# Patient Record
Sex: MALE | Race: WHITE | NOT HISPANIC OR LATINO | Employment: OTHER | URBAN - METROPOLITAN AREA
[De-identification: names, ages, dates, MRNs, and addresses within clinical notes are randomized per-mention and may not be internally consistent; named-entity substitution may affect disease eponyms.]

---

## 2023-03-03 PROBLEM — R13.19 ESOPHAGEAL DYSPHAGIA: Status: ACTIVE | Noted: 2023-03-03

## 2023-03-03 PROBLEM — R94.31 ABNORMAL EKG: Status: ACTIVE | Noted: 2023-03-03

## 2023-03-03 PROBLEM — E78.2 COMBINED HYPERLIPIDEMIA: Status: ACTIVE | Noted: 2023-03-03

## 2023-03-03 PROBLEM — J45.40 MODERATE PERSISTENT ASTHMA WITHOUT COMPLICATION (HHS-HCC): Status: ACTIVE | Noted: 2023-03-03

## 2023-03-03 PROBLEM — K21.9 GERD WITHOUT ESOPHAGITIS: Status: ACTIVE | Noted: 2023-03-03

## 2023-03-03 PROBLEM — R73.9 HYPERGLYCEMIA: Status: ACTIVE | Noted: 2023-03-03

## 2023-03-03 PROBLEM — N40.1 BPH WITH OBSTRUCTION/LOWER URINARY TRACT SYMPTOMS: Status: ACTIVE | Noted: 2023-03-03

## 2023-03-03 PROBLEM — L40.0 PLAQUE PSORIASIS: Status: ACTIVE | Noted: 2023-03-03

## 2023-03-03 PROBLEM — M25.552 LEFT HIP PAIN: Status: ACTIVE | Noted: 2023-03-03

## 2023-03-03 PROBLEM — N13.8 BPH WITH OBSTRUCTION/LOWER URINARY TRACT SYMPTOMS: Status: ACTIVE | Noted: 2023-03-03

## 2023-03-03 PROBLEM — J42 CHRONIC BRONCHITIS (MULTI): Status: ACTIVE | Noted: 2023-03-03

## 2023-03-03 RX ORDER — PREDNISONE 5 MG/1
5 TABLET ORAL DAILY
COMMUNITY
End: 2023-10-12 | Stop reason: ALTCHOICE

## 2023-03-03 RX ORDER — OMEPRAZOLE 40 MG/1
1 CAPSULE, DELAYED RELEASE ORAL DAILY
COMMUNITY
Start: 2019-06-06 | End: 2023-06-01 | Stop reason: SDUPTHER

## 2023-03-03 RX ORDER — CLOBETASOL PROPIONATE 0.5 MG/G
CREAM TOPICAL
COMMUNITY
Start: 2019-09-20 | End: 2023-12-04 | Stop reason: WASHOUT

## 2023-03-03 RX ORDER — ALBUTEROL SULFATE 90 UG/1
2 AEROSOL, METERED RESPIRATORY (INHALATION) EVERY 6 HOURS PRN
COMMUNITY
End: 2023-12-04 | Stop reason: WASHOUT

## 2023-03-03 RX ORDER — BUDESONIDE AND FORMOTEROL FUMARATE DIHYDRATE 160; 4.5 UG/1; UG/1
2 AEROSOL RESPIRATORY (INHALATION)
COMMUNITY
End: 2023-07-31

## 2023-03-03 RX ORDER — TAMSULOSIN HYDROCHLORIDE 0.4 MG/1
1 CAPSULE ORAL DAILY
COMMUNITY
Start: 2020-09-08 | End: 2023-06-01 | Stop reason: SDUPTHER

## 2023-03-03 RX ORDER — PREDNISONE 20 MG/1
TABLET ORAL
COMMUNITY
End: 2023-10-12 | Stop reason: ALTCHOICE

## 2023-03-09 RX ORDER — FLUTICASONE PROPIONATE AND SALMETEROL 100; 50 UG/1; UG/1
POWDER RESPIRATORY (INHALATION) 2 TIMES DAILY
COMMUNITY
End: 2023-10-12

## 2023-03-13 ENCOUNTER — OFFICE VISIT (OUTPATIENT)
Dept: PRIMARY CARE | Facility: CLINIC | Age: 73
End: 2023-03-13
Payer: MEDICARE

## 2023-03-13 VITALS
HEART RATE: 57 BPM | BODY MASS INDEX: 31.18 KG/M2 | HEIGHT: 66 IN | OXYGEN SATURATION: 95 % | TEMPERATURE: 98.1 F | WEIGHT: 194 LBS | DIASTOLIC BLOOD PRESSURE: 76 MMHG | SYSTOLIC BLOOD PRESSURE: 132 MMHG

## 2023-03-13 DIAGNOSIS — J42 CHRONIC BRONCHITIS, UNSPECIFIED CHRONIC BRONCHITIS TYPE (MULTI): Primary | ICD-10-CM

## 2023-03-13 DIAGNOSIS — Z00.00 GENERAL MEDICAL EXAM: ICD-10-CM

## 2023-03-13 DIAGNOSIS — J45.40 MODERATE PERSISTENT ASTHMA WITHOUT COMPLICATION (HHS-HCC): ICD-10-CM

## 2023-03-13 DIAGNOSIS — K21.9 GERD WITHOUT ESOPHAGITIS: ICD-10-CM

## 2023-03-13 DIAGNOSIS — E78.2 COMBINED HYPERLIPIDEMIA: ICD-10-CM

## 2023-03-13 DIAGNOSIS — R09.82 POST-NASAL DRIP: ICD-10-CM

## 2023-03-13 PROBLEM — M25.552 LEFT HIP PAIN: Status: RESOLVED | Noted: 2023-03-03 | Resolved: 2023-03-13

## 2023-03-13 PROBLEM — R94.31 ABNORMAL EKG: Status: RESOLVED | Noted: 2023-03-03 | Resolved: 2023-03-13

## 2023-03-13 PROCEDURE — G0439 PPPS, SUBSEQ VISIT: HCPCS | Performed by: FAMILY MEDICINE

## 2023-03-13 PROCEDURE — 1157F ADVNC CARE PLAN IN RCRD: CPT | Performed by: FAMILY MEDICINE

## 2023-03-13 PROCEDURE — 1170F FXNL STATUS ASSESSED: CPT | Performed by: FAMILY MEDICINE

## 2023-03-13 PROCEDURE — 99214 OFFICE O/P EST MOD 30 MIN: CPT | Performed by: FAMILY MEDICINE

## 2023-03-13 PROCEDURE — 3008F BODY MASS INDEX DOCD: CPT | Performed by: FAMILY MEDICINE

## 2023-03-13 PROCEDURE — 1160F RVW MEDS BY RX/DR IN RCRD: CPT | Performed by: FAMILY MEDICINE

## 2023-03-13 PROCEDURE — 1036F TOBACCO NON-USER: CPT | Performed by: FAMILY MEDICINE

## 2023-03-13 PROCEDURE — 1159F MED LIST DOCD IN RCRD: CPT | Performed by: FAMILY MEDICINE

## 2023-03-13 RX ORDER — IPRATROPIUM BROMIDE 21 UG/1
2 SPRAY, METERED NASAL ONCE
Status: DISCONTINUED | OUTPATIENT
Start: 2023-03-13 | End: 2023-10-12

## 2023-03-13 ASSESSMENT — ENCOUNTER SYMPTOMS
VOMITING: 0
CONSTIPATION: 0
UNEXPECTED WEIGHT CHANGE: 0
PALPITATIONS: 0

## 2023-03-13 ASSESSMENT — ACTIVITIES OF DAILY LIVING (ADL)
BATHING: INDEPENDENT
MANAGING_FINANCES: INDEPENDENT
DOING_HOUSEWORK: INDEPENDENT
TAKING_MEDICATION: INDEPENDENT
GROCERY_SHOPPING: INDEPENDENT
DRESSING: INDEPENDENT

## 2023-03-13 ASSESSMENT — PATIENT HEALTH QUESTIONNAIRE - PHQ9
2. FEELING DOWN, DEPRESSED OR HOPELESS: NOT AT ALL
SUM OF ALL RESPONSES TO PHQ9 QUESTIONS 1 AND 2: 0
1. LITTLE INTEREST OR PLEASURE IN DOING THINGS: NOT AT ALL

## 2023-03-13 NOTE — PROGRESS NOTES
"Subjective   Reason for Visit: Santy Siddiqi is an 72 y.o. male here for a Medicare Wellness visit.   Thinks he might have some PND, is still clearing throat a lot  Past Medical, Surgical, and Family History reviewed and updated in chart.    Reviewed all medications by prescribing practitioner or clinical pharmacist (such as prescriptions, OTCs, herbal therapies and supplements) and documented in the medical record.    HPI    Patient Self Assessment of Health Status  Patient Self Assessment: Good  Patient has history of chronic bronchitis asthma hyperlipidemia hyperglycemia GERD  No CP SOB edema  Like to wean PPI  Nutrition and Exercise  Current Diet: Well Balanced Diet  Adequate Fluid Intake: Yes  Caffeine: Yes  Exercise Frequency: Infrequently    Functional Ability/Level of Safety  Cognitive Impairment Observed: No cognitive impairment observed    Home Safety Risk Factors: None    Patient Care Team:  Gage Hobbs MD as PCP - General  Gage Hobbs MD as PCP - [employer] PCP     Review of Systems   Constitutional:  Negative for unexpected weight change.   HENT:  Negative for congestion and ear discharge.    Cardiovascular:  Negative for chest pain and palpitations.   Gastrointestinal:  Negative for constipation and vomiting.   All other systems reviewed and are negative.      Objective   Vitals:  /76   Pulse 57   Temp 36.7 °C (98.1 °F)   Ht 1.676 m (5' 6\")   Wt 88 kg (194 lb)   SpO2 95%   BMI 31.31 kg/m²       Physical Exam  HENT:      Head: Normocephalic and atraumatic.      Nose: Nose normal.      Mouth/Throat:      Mouth: Mucous membranes are moist.      Pharynx: No oropharyngeal exudate.   Eyes:      Extraocular Movements: Extraocular movements intact.      Conjunctiva/sclera: Conjunctivae normal.      Pupils: Pupils are equal, round, and reactive to light.   Cardiovascular:      Rate and Rhythm: Normal rate and regular rhythm.   Pulmonary:      Effort: Pulmonary effort is " normal.   Abdominal:      General: There is no distension.      Palpations: Abdomen is soft.   Musculoskeletal:      Cervical back: Normal range of motion and neck supple.   Lymphadenopathy:      Cervical: No cervical adenopathy.   Neurological:      General: No focal deficit present.      Mental Status: He is alert.   Psychiatric:         Attention and Perception: Attention normal.         Speech: Speech normal.         Behavior: Behavior is cooperative.         Assessment/Plan   Problem List Items Addressed This Visit          Respiratory    Chronic bronchitis (CMS/HCC) - Primary    Moderate persistent asthma without complication       Digestive    GERD without esophagitis       Other    Combined hyperlipidemia     Other Visit Diagnoses       Post-nasal drip        Relevant Medications    ipratropium (Atrovent) 21 mcg (0.03 %) nasal spray 2 spray    General medical exam              Reviewed labs  Discussed PND treatments  Discussed trial of weaning PPI

## 2023-03-14 DIAGNOSIS — R09.82 POST-NASAL DRIP: Primary | ICD-10-CM

## 2023-03-14 RX ORDER — IPRATROPIUM BROMIDE 21 UG/1
2 SPRAY, METERED NASAL 3 TIMES DAILY
Qty: 30 ML | Refills: 1 | Status: SHIPPED | OUTPATIENT
Start: 2023-03-14 | End: 2023-07-31

## 2023-03-14 NOTE — TELEPHONE ENCOUNTER
Per your instructions set up Ipratropium nasal spray, the pharmacy is flagging for dosing being too much, but you can adjust if needed

## 2023-03-14 NOTE — TELEPHONE ENCOUNTER
Pt. Called stating script did not go to pharmacy for Ipratropium (Atrovent) Note script was set up as clinic administered nasal spray. Should this be for nasal spray or inhaler? And how often to be used?

## 2023-05-31 DIAGNOSIS — N13.8 BPH WITH OBSTRUCTION/LOWER URINARY TRACT SYMPTOMS: ICD-10-CM

## 2023-05-31 DIAGNOSIS — N40.1 BPH WITH OBSTRUCTION/LOWER URINARY TRACT SYMPTOMS: ICD-10-CM

## 2023-05-31 DIAGNOSIS — K21.9 GERD WITHOUT ESOPHAGITIS: ICD-10-CM

## 2023-05-31 NOTE — TELEPHONE ENCOUNTER
Rx Refill Request Telephone Encounter    Name:  Santy Siddiqi  :  [id number]  Medication Name:    Omeprazole 40mg 1 tab every day 90 day  Tamsulosin 0.4mg 24hr cap 1 cap every day 90 day  Specific Pharmacy location:  [facility]  Date of last appointment:  22  Date of next appointment:  na  Best number to reach patient:  [phone]

## 2023-06-01 RX ORDER — OMEPRAZOLE 40 MG/1
40 CAPSULE, DELAYED RELEASE ORAL DAILY
Qty: 90 CAPSULE | Refills: 0 | Status: SHIPPED | OUTPATIENT
Start: 2023-06-01 | End: 2023-09-12 | Stop reason: SDUPTHER

## 2023-06-01 RX ORDER — TAMSULOSIN HYDROCHLORIDE 0.4 MG/1
0.4 CAPSULE ORAL DAILY
Qty: 90 CAPSULE | Refills: 0 | Status: SHIPPED | OUTPATIENT
Start: 2023-06-01 | End: 2023-09-12 | Stop reason: SDUPTHER

## 2023-07-31 DIAGNOSIS — R09.82 POST-NASAL DRIP: ICD-10-CM

## 2023-07-31 DIAGNOSIS — J45.40 MODERATE PERSISTENT ASTHMA WITHOUT COMPLICATION (HHS-HCC): Primary | ICD-10-CM

## 2023-07-31 RX ORDER — BUDESONIDE AND FORMOTEROL FUMARATE DIHYDRATE 160; 4.5 UG/1; UG/1
2 AEROSOL RESPIRATORY (INHALATION) 2 TIMES DAILY
Qty: 30.6 G | Refills: 0 | Status: SHIPPED | OUTPATIENT
Start: 2023-07-31 | End: 2023-11-06 | Stop reason: SDUPTHER

## 2023-07-31 RX ORDER — IPRATROPIUM BROMIDE 21 UG/1
SPRAY, METERED NASAL
Qty: 30 ML | Refills: 0 | Status: SHIPPED | OUTPATIENT
Start: 2023-07-31

## 2023-09-11 DIAGNOSIS — N40.1 BPH WITH OBSTRUCTION/LOWER URINARY TRACT SYMPTOMS: ICD-10-CM

## 2023-09-11 DIAGNOSIS — K21.9 GERD WITHOUT ESOPHAGITIS: ICD-10-CM

## 2023-09-11 DIAGNOSIS — N13.8 BPH WITH OBSTRUCTION/LOWER URINARY TRACT SYMPTOMS: ICD-10-CM

## 2023-09-11 NOTE — TELEPHONE ENCOUNTER
Pt calling for refills  LOV 3/13/2023    Tamsulosin 0.4mg  1 PO every day  90 days    Omeprazole 40mg  1 PO every day  90 days    KAMLESH GRAF

## 2023-09-12 RX ORDER — TAMSULOSIN HYDROCHLORIDE 0.4 MG/1
0.4 CAPSULE ORAL DAILY
Qty: 90 CAPSULE | Refills: 0 | Status: SHIPPED | OUTPATIENT
Start: 2023-09-12 | End: 2023-12-04 | Stop reason: SDUPTHER

## 2023-09-12 RX ORDER — OMEPRAZOLE 40 MG/1
40 CAPSULE, DELAYED RELEASE ORAL DAILY
Qty: 90 CAPSULE | Refills: 0 | Status: SHIPPED | OUTPATIENT
Start: 2023-09-12 | End: 2023-12-04 | Stop reason: SDUPTHER

## 2023-10-12 ENCOUNTER — OFFICE VISIT (OUTPATIENT)
Dept: PRIMARY CARE | Facility: CLINIC | Age: 73
End: 2023-10-12
Payer: MEDICARE

## 2023-10-12 VITALS
WEIGHT: 181 LBS | DIASTOLIC BLOOD PRESSURE: 60 MMHG | HEART RATE: 60 BPM | BODY MASS INDEX: 29.21 KG/M2 | OXYGEN SATURATION: 93 % | SYSTOLIC BLOOD PRESSURE: 110 MMHG

## 2023-10-12 DIAGNOSIS — J45.40 MODERATE PERSISTENT ASTHMA WITHOUT COMPLICATION (HHS-HCC): ICD-10-CM

## 2023-10-12 DIAGNOSIS — N13.8 BPH WITH OBSTRUCTION/LOWER URINARY TRACT SYMPTOMS: ICD-10-CM

## 2023-10-12 DIAGNOSIS — N40.1 BPH WITH OBSTRUCTION/LOWER URINARY TRACT SYMPTOMS: ICD-10-CM

## 2023-10-12 DIAGNOSIS — J41.1 MUCOPURULENT CHRONIC BRONCHITIS (MULTI): ICD-10-CM

## 2023-10-12 DIAGNOSIS — J31.0 CHRONIC RHINITIS: Primary | ICD-10-CM

## 2023-10-12 DIAGNOSIS — K21.9 GERD WITHOUT ESOPHAGITIS: ICD-10-CM

## 2023-10-12 PROCEDURE — 1160F RVW MEDS BY RX/DR IN RCRD: CPT | Performed by: FAMILY MEDICINE

## 2023-10-12 PROCEDURE — 99213 OFFICE O/P EST LOW 20 MIN: CPT | Performed by: FAMILY MEDICINE

## 2023-10-12 PROCEDURE — 1036F TOBACCO NON-USER: CPT | Performed by: FAMILY MEDICINE

## 2023-10-12 PROCEDURE — 3008F BODY MASS INDEX DOCD: CPT | Performed by: FAMILY MEDICINE

## 2023-10-12 PROCEDURE — 1159F MED LIST DOCD IN RCRD: CPT | Performed by: FAMILY MEDICINE

## 2023-10-12 ASSESSMENT — ENCOUNTER SYMPTOMS
PALPITATIONS: 0
CONSTIPATION: 0
VOMITING: 0
UNEXPECTED WEIGHT CHANGE: 0

## 2023-10-12 NOTE — PROGRESS NOTES
Needs some refills ,   still getting some reflux or drainage after hiatal hernia repair earlier this year has improved the swallowing hasn't had so many episodes of getting something caught has tried some decongestant to get rid of the drainage and Mucinex is too big for him to swallow  Subjective   Patient ID: Santy Siddiqi is a 73 y.o. male who presents for Follow-up.    HPI   Patient says he has chronic nasal drainage and clearing of throat  Does have ipratropium nasal spray, also has Symbicort for chronic bronchitis/moderate persistent asthma, also has history of GERD with repair of diaphragmatic hernia earlier this year and continues on PPI  No fever chills  Occasional light green sputum  Does have history at pneumonia remotely in [employer]  Uses Flomax for BPH with LUTS    Review of Systems   Constitutional:  Negative for unexpected weight change.   HENT:  Negative for congestion and ear discharge.    Cardiovascular:  Negative for chest pain and palpitations.   Gastrointestinal:  Negative for constipation and vomiting.   All other systems reviewed and are negative.      Objective   /60   Pulse 60   Wt 82.1 kg (181 lb)   SpO2 93%   BMI 29.21 kg/m²     Physical Exam  HENT:      Head: Normocephalic and atraumatic.      Nose: Nose normal.      Mouth/Throat:      Mouth: Mucous membranes are moist.      Pharynx: No oropharyngeal exudate.   Eyes:      Extraocular Movements: Extraocular movements intact.      Conjunctiva/sclera: Conjunctivae normal.      Pupils: Pupils are equal, round, and reactive to light.   Cardiovascular:      Rate and Rhythm: Normal rate and regular rhythm.   Pulmonary:      Effort: Pulmonary effort is normal.   Abdominal:      General: There is no distension.      Palpations: Abdomen is soft.   Musculoskeletal:      Cervical back: Normal range of motion and neck supple.   Lymphadenopathy:      Cervical: No cervical adenopathy.   Neurological:      General: No focal deficit present.       Mental Status: He is alert.   Psychiatric:         Attention and Perception: Attention normal.         Speech: Speech normal.         Behavior: Behavior is cooperative.         Assessment/Plan   Diagnoses and all orders for this visit:  Chronic rhinitis  Comments:  Recommend increasing Atrovent to 4 times daily  Orders:  -     Referral to ENT; Future  BPH with obstruction/lower urinary tract symptoms  Comments:  Discussed possibility of deseeding Flomax which can cause rhinitis frequently and seeking a UroLift procedure  GERD without esophagitis  Comments:  Patient says he is interested in deseeding omeprazole  Moderate persistent asthma without complication  Comments:  Continue puffers  Mucopurulent chronic bronchitis (CMS/HCC)  Comments:  Consider further evaluation of this with pulmonology  Recheck 3 months sooner if any issues arise

## 2023-10-27 ENCOUNTER — HOSPITAL ENCOUNTER (INPATIENT)
Facility: HOSPITAL | Age: 73
LOS: 5 days | Discharge: HOME | DRG: 418 | End: 2023-11-01
Attending: STUDENT IN AN ORGANIZED HEALTH CARE EDUCATION/TRAINING PROGRAM | Admitting: STUDENT IN AN ORGANIZED HEALTH CARE EDUCATION/TRAINING PROGRAM
Payer: MEDICARE

## 2023-10-27 ENCOUNTER — APPOINTMENT (OUTPATIENT)
Dept: RADIOLOGY | Facility: HOSPITAL | Age: 73
DRG: 418 | End: 2023-10-27
Payer: MEDICARE

## 2023-10-27 DIAGNOSIS — Z01.89 ENCOUNTER FOR CARDIOVERSION PROCEDURE: ICD-10-CM

## 2023-10-27 DIAGNOSIS — I43 CARDIOMYOPATHY IN DISEASES CLASSIFIED ELSEWHERE (MULTI): ICD-10-CM

## 2023-10-27 DIAGNOSIS — R93.89 ABNORMAL CT SCAN: ICD-10-CM

## 2023-10-27 DIAGNOSIS — K81.9 CHOLECYSTITIS: ICD-10-CM

## 2023-10-27 DIAGNOSIS — I48.91 ATRIAL FIBRILLATION WITH RAPID VENTRICULAR RESPONSE (MULTI): ICD-10-CM

## 2023-10-27 DIAGNOSIS — I48.91 NEW ONSET A-FIB (MULTI): ICD-10-CM

## 2023-10-27 DIAGNOSIS — K21.9 GERD WITHOUT ESOPHAGITIS: ICD-10-CM

## 2023-10-27 DIAGNOSIS — K56.609 INTESTINAL OBSTRUCTION, UNSPECIFIED CAUSE, UNSPECIFIED WHETHER PARTIAL OR COMPLETE (MULTI): Primary | ICD-10-CM

## 2023-10-27 DIAGNOSIS — K82.8 GALLBLADDER SLUDGE: ICD-10-CM

## 2023-10-27 DIAGNOSIS — R94.31 ABNORMAL ELECTROCARDIOGRAM (ECG) (EKG): ICD-10-CM

## 2023-10-27 LAB
ALBUMIN SERPL BCP-MCNC: 4.5 G/DL (ref 3.4–5)
ALP SERPL-CCNC: 68 U/L (ref 33–136)
ALT SERPL W P-5'-P-CCNC: 23 U/L (ref 10–52)
ANION GAP SERPL CALC-SCNC: 16 MMOL/L (ref 10–20)
AST SERPL W P-5'-P-CCNC: 22 U/L (ref 9–39)
BASOPHILS # BLD AUTO: 0.02 X10*3/UL (ref 0–0.1)
BASOPHILS NFR BLD AUTO: 0.3 %
BILIRUB SERPL-MCNC: 0.6 MG/DL (ref 0–1.2)
BUN SERPL-MCNC: 17 MG/DL (ref 6–23)
CALCIUM SERPL-MCNC: 10 MG/DL (ref 8.6–10.3)
CARDIAC TROPONIN I PNL SERPL HS: 4 NG/L (ref 0–20)
CARDIAC TROPONIN I PNL SERPL HS: 7 NG/L (ref 0–20)
CHLORIDE SERPL-SCNC: 103 MMOL/L (ref 98–107)
CO2 SERPL-SCNC: 25 MMOL/L (ref 21–32)
CREAT SERPL-MCNC: 1.15 MG/DL (ref 0.5–1.3)
EOSINOPHIL # BLD AUTO: 0.39 X10*3/UL (ref 0–0.4)
EOSINOPHIL NFR BLD AUTO: 6.7 %
ERYTHROCYTE [DISTWIDTH] IN BLOOD BY AUTOMATED COUNT: 12.6 % (ref 11.5–14.5)
GFR SERPL CREATININE-BSD FRML MDRD: 67 ML/MIN/1.73M*2
GLUCOSE SERPL-MCNC: 135 MG/DL (ref 74–99)
HCT VFR BLD AUTO: 46.7 % (ref 41–52)
HGB BLD-MCNC: 15.6 G/DL (ref 13.5–17.5)
IMM GRANULOCYTES # BLD AUTO: 0.01 X10*3/UL (ref 0–0.5)
IMM GRANULOCYTES NFR BLD AUTO: 0.2 % (ref 0–0.9)
LACTATE SERPL-SCNC: 2 MMOL/L (ref 0.4–2)
LACTATE SERPL-SCNC: 2.2 MMOL/L (ref 0.4–2)
LIPASE SERPL-CCNC: 16 U/L (ref 9–82)
LYMPHOCYTES # BLD AUTO: 1.71 X10*3/UL (ref 0.8–3)
LYMPHOCYTES NFR BLD AUTO: 29.4 %
MCH RBC QN AUTO: 30.6 PG (ref 26–34)
MCHC RBC AUTO-ENTMCNC: 33.4 G/DL (ref 32–36)
MCV RBC AUTO: 92 FL (ref 80–100)
MONOCYTES # BLD AUTO: 0.55 X10*3/UL (ref 0.05–0.8)
MONOCYTES NFR BLD AUTO: 9.5 %
NEUTROPHILS # BLD AUTO: 3.13 X10*3/UL (ref 1.6–5.5)
NEUTROPHILS NFR BLD AUTO: 53.9 %
NRBC BLD-RTO: 0 /100 WBCS (ref 0–0)
PLATELET # BLD AUTO: 278 X10*3/UL (ref 150–450)
PMV BLD AUTO: 9.6 FL (ref 7.5–11.5)
POTASSIUM SERPL-SCNC: 3.8 MMOL/L (ref 3.5–5.3)
PROT SERPL-MCNC: 8 G/DL (ref 6.4–8.2)
RBC # BLD AUTO: 5.09 X10*6/UL (ref 4.5–5.9)
SODIUM SERPL-SCNC: 140 MMOL/L (ref 136–145)
WBC # BLD AUTO: 5.8 X10*3/UL (ref 4.4–11.3)

## 2023-10-27 PROCEDURE — 96374 THER/PROPH/DIAG INJ IV PUSH: CPT

## 2023-10-27 PROCEDURE — 99285 EMERGENCY DEPT VISIT HI MDM: CPT | Performed by: STUDENT IN AN ORGANIZED HEALTH CARE EDUCATION/TRAINING PROGRAM

## 2023-10-27 PROCEDURE — 2550000001 HC RX 255 CONTRASTS: Performed by: STUDENT IN AN ORGANIZED HEALTH CARE EDUCATION/TRAINING PROGRAM

## 2023-10-27 PROCEDURE — 84075 ASSAY ALKALINE PHOSPHATASE: CPT | Performed by: STUDENT IN AN ORGANIZED HEALTH CARE EDUCATION/TRAINING PROGRAM

## 2023-10-27 PROCEDURE — 99222 1ST HOSP IP/OBS MODERATE 55: CPT | Performed by: STUDENT IN AN ORGANIZED HEALTH CARE EDUCATION/TRAINING PROGRAM

## 2023-10-27 PROCEDURE — 84484 ASSAY OF TROPONIN QUANT: CPT | Performed by: STUDENT IN AN ORGANIZED HEALTH CARE EDUCATION/TRAINING PROGRAM

## 2023-10-27 PROCEDURE — 74177 CT ABD & PELVIS W/CONTRAST: CPT | Mod: FR,MG

## 2023-10-27 PROCEDURE — 92960 CARDIOVERSION ELECTRIC EXT: CPT | Performed by: STUDENT IN AN ORGANIZED HEALTH CARE EDUCATION/TRAINING PROGRAM

## 2023-10-27 PROCEDURE — 36415 COLL VENOUS BLD VENIPUNCTURE: CPT | Performed by: STUDENT IN AN ORGANIZED HEALTH CARE EDUCATION/TRAINING PROGRAM

## 2023-10-27 PROCEDURE — 2500000004 HC RX 250 GENERAL PHARMACY W/ HCPCS (ALT 636 FOR OP/ED)

## 2023-10-27 PROCEDURE — 83605 ASSAY OF LACTIC ACID: CPT | Performed by: STUDENT IN AN ORGANIZED HEALTH CARE EDUCATION/TRAINING PROGRAM

## 2023-10-27 PROCEDURE — 96375 TX/PRO/DX INJ NEW DRUG ADDON: CPT

## 2023-10-27 PROCEDURE — 74018 RADEX ABDOMEN 1 VIEW: CPT | Mod: FOREIGN READ | Performed by: RADIOLOGY

## 2023-10-27 PROCEDURE — 74018 RADEX ABDOMEN 1 VIEW: CPT | Mod: FR

## 2023-10-27 PROCEDURE — 1100000001 HC PRIVATE ROOM DAILY

## 2023-10-27 PROCEDURE — 85025 COMPLETE CBC W/AUTO DIFF WBC: CPT | Performed by: STUDENT IN AN ORGANIZED HEALTH CARE EDUCATION/TRAINING PROGRAM

## 2023-10-27 PROCEDURE — 83690 ASSAY OF LIPASE: CPT | Performed by: STUDENT IN AN ORGANIZED HEALTH CARE EDUCATION/TRAINING PROGRAM

## 2023-10-27 PROCEDURE — 0D9670Z DRAINAGE OF STOMACH WITH DRAINAGE DEVICE, VIA NATURAL OR ARTIFICIAL OPENING: ICD-10-PCS | Performed by: STUDENT IN AN ORGANIZED HEALTH CARE EDUCATION/TRAINING PROGRAM

## 2023-10-27 PROCEDURE — 74177 CT ABD & PELVIS W/CONTRAST: CPT | Mod: FOREIGN READ | Performed by: RADIOLOGY

## 2023-10-27 PROCEDURE — 2500000004 HC RX 250 GENERAL PHARMACY W/ HCPCS (ALT 636 FOR OP/ED): Performed by: STUDENT IN AN ORGANIZED HEALTH CARE EDUCATION/TRAINING PROGRAM

## 2023-10-27 RX ORDER — ACETAMINOPHEN 650 MG/1
650 SUPPOSITORY RECTAL EVERY 4 HOURS PRN
Status: DISCONTINUED | OUTPATIENT
Start: 2023-10-27 | End: 2023-10-28

## 2023-10-27 RX ORDER — ACETAMINOPHEN 325 MG/1
650 TABLET ORAL EVERY 4 HOURS PRN
Status: DISCONTINUED | OUTPATIENT
Start: 2023-10-27 | End: 2023-10-28

## 2023-10-27 RX ORDER — ONDANSETRON 4 MG/1
4 TABLET, FILM COATED ORAL EVERY 8 HOURS PRN
Status: DISCONTINUED | OUTPATIENT
Start: 2023-10-27 | End: 2023-10-28

## 2023-10-27 RX ORDER — HYDROMORPHONE HYDROCHLORIDE 1 MG/ML
INJECTION, SOLUTION INTRAMUSCULAR; INTRAVENOUS; SUBCUTANEOUS
Status: COMPLETED
Start: 2023-10-27 | End: 2023-10-27

## 2023-10-27 RX ORDER — PANTOPRAZOLE SODIUM 40 MG/10ML
40 INJECTION, POWDER, LYOPHILIZED, FOR SOLUTION INTRAVENOUS
Status: DISCONTINUED | OUTPATIENT
Start: 2023-10-28 | End: 2023-10-28

## 2023-10-27 RX ORDER — MORPHINE SULFATE 2 MG/ML
1 INJECTION, SOLUTION INTRAMUSCULAR; INTRAVENOUS EVERY 4 HOURS PRN
Status: DISCONTINUED | OUTPATIENT
Start: 2023-10-27 | End: 2023-10-28

## 2023-10-27 RX ORDER — ONDANSETRON HYDROCHLORIDE 2 MG/ML
INJECTION, SOLUTION INTRAVENOUS
Status: COMPLETED
Start: 2023-10-27 | End: 2023-10-27

## 2023-10-27 RX ORDER — ENOXAPARIN SODIUM 100 MG/ML
40 INJECTION SUBCUTANEOUS EVERY 24 HOURS
Status: DISCONTINUED | OUTPATIENT
Start: 2023-10-27 | End: 2023-10-28

## 2023-10-27 RX ORDER — ONDANSETRON HYDROCHLORIDE 2 MG/ML
4 INJECTION, SOLUTION INTRAVENOUS ONCE
Status: COMPLETED | OUTPATIENT
Start: 2023-10-27 | End: 2023-10-27

## 2023-10-27 RX ORDER — PANTOPRAZOLE SODIUM 40 MG/1
40 TABLET, DELAYED RELEASE ORAL
Status: DISCONTINUED | OUTPATIENT
Start: 2023-10-28 | End: 2023-10-28

## 2023-10-27 RX ORDER — MORPHINE SULFATE 4 MG/ML
4 INJECTION INTRAVENOUS ONCE
Status: COMPLETED | OUTPATIENT
Start: 2023-10-27 | End: 2023-10-27

## 2023-10-27 RX ORDER — SODIUM CHLORIDE 9 MG/ML
75 INJECTION, SOLUTION INTRAVENOUS CONTINUOUS
Status: DISCONTINUED | OUTPATIENT
Start: 2023-10-27 | End: 2023-10-28

## 2023-10-27 RX ORDER — IPRATROPIUM BROMIDE AND ALBUTEROL SULFATE 2.5; .5 MG/3ML; MG/3ML
3 SOLUTION RESPIRATORY (INHALATION)
Status: DISCONTINUED | OUTPATIENT
Start: 2023-10-28 | End: 2023-10-28

## 2023-10-27 RX ORDER — BUDESONIDE 0.5 MG/2ML
0.5 INHALANT ORAL
Status: DISCONTINUED | OUTPATIENT
Start: 2023-10-28 | End: 2023-11-01 | Stop reason: HOSPADM

## 2023-10-27 RX ORDER — TAMSULOSIN HYDROCHLORIDE 0.4 MG/1
0.4 CAPSULE ORAL DAILY
Status: DISCONTINUED | OUTPATIENT
Start: 2023-10-28 | End: 2023-11-01 | Stop reason: HOSPADM

## 2023-10-27 RX ORDER — ACETAMINOPHEN 160 MG/5ML
650 SOLUTION ORAL EVERY 4 HOURS PRN
Status: DISCONTINUED | OUTPATIENT
Start: 2023-10-27 | End: 2023-10-28

## 2023-10-27 RX ORDER — HYDROMORPHONE HYDROCHLORIDE 1 MG/ML
1 INJECTION, SOLUTION INTRAMUSCULAR; INTRAVENOUS; SUBCUTANEOUS ONCE
Status: COMPLETED | OUTPATIENT
Start: 2023-10-27 | End: 2023-10-27

## 2023-10-27 RX ORDER — ONDANSETRON HYDROCHLORIDE 2 MG/ML
4 INJECTION, SOLUTION INTRAVENOUS EVERY 8 HOURS PRN
Status: DISCONTINUED | OUTPATIENT
Start: 2023-10-27 | End: 2023-10-28

## 2023-10-27 RX ADMIN — ONDANSETRON 4 MG: 2 INJECTION INTRAMUSCULAR; INTRAVENOUS at 19:10

## 2023-10-27 RX ADMIN — IOHEXOL 75 ML: 350 INJECTION, SOLUTION INTRAVENOUS at 19:49

## 2023-10-27 RX ADMIN — HYDROMORPHONE HYDROCHLORIDE 1 MG: 1 INJECTION, SOLUTION INTRAMUSCULAR; INTRAVENOUS; SUBCUTANEOUS at 20:16

## 2023-10-27 RX ADMIN — MORPHINE SULFATE 4 MG: 4 INJECTION INTRAVENOUS at 19:25

## 2023-10-27 RX ADMIN — ONDANSETRON HYDROCHLORIDE 4 MG: 2 INJECTION, SOLUTION INTRAVENOUS at 19:10

## 2023-10-27 ASSESSMENT — COLUMBIA-SUICIDE SEVERITY RATING SCALE - C-SSRS
6. HAVE YOU EVER DONE ANYTHING, STARTED TO DO ANYTHING, OR PREPARED TO DO ANYTHING TO END YOUR LIFE?: NO
1. IN THE PAST MONTH, HAVE YOU WISHED YOU WERE DEAD OR WISHED YOU COULD GO TO SLEEP AND NOT WAKE UP?: NO
2. HAVE YOU ACTUALLY HAD ANY THOUGHTS OF KILLING YOURSELF?: NO

## 2023-10-27 ASSESSMENT — PAIN SCALES - GENERAL
PAINLEVEL_OUTOF10: 9

## 2023-10-27 ASSESSMENT — PAIN DESCRIPTION - PAIN TYPE: TYPE: ACUTE PAIN

## 2023-10-27 ASSESSMENT — LIFESTYLE VARIABLES
HAVE YOU EVER FELT YOU SHOULD CUT DOWN ON YOUR DRINKING: NO
HAVE PEOPLE ANNOYED YOU BY CRITICIZING YOUR DRINKING: NO
EVER HAD A DRINK FIRST THING IN THE MORNING TO STEADY YOUR NERVES TO GET RID OF A HANGOVER: NO
EVER FELT BAD OR GUILTY ABOUT YOUR DRINKING: NO
REASON UNABLE TO ASSESS: YES

## 2023-10-27 ASSESSMENT — PAIN DESCRIPTION - DESCRIPTORS: DESCRIPTORS: CRAMPING

## 2023-10-27 ASSESSMENT — PAIN - FUNCTIONAL ASSESSMENT: PAIN_FUNCTIONAL_ASSESSMENT: 0-10

## 2023-10-27 ASSESSMENT — PAIN DESCRIPTION - LOCATION: LOCATION: ABDOMEN

## 2023-10-27 NOTE — ED PROVIDER NOTES
History/Exam limitations: none  HPI was provided by patient    HPI:    Chief Complaint   Patient presents with    Abdominal Pain    Nausea        Santy Siddiqi is a 73 y.o. male with pertinent history of hiatal hernia repair back in June presenting chief complaint of nausea vomiting and abdominal pain.  This started 1 hour prior to arrival he also has some sweating.  States he has not vomited anything but has been dry heaving.  Did eat bologna that he feels may be started this.  Denies symptoms are made better worse by anything.  They deny recent travel, similar symptoms found amongst close contacts or recent antibiotic use.       ROS:  All other review of systems are negative except as noted above and HPI or ROS.   CONSTITUTIONAL: fever, chills  ENT: sore throat, congestion, rhinorrhea  CARDIOVASCULAR: chest pain, palpitations, swelling  RESPIRATORY: cough, wheeze, shortness of breath  GI: nausea, vomiting, diarrhea, abdominal pain,  black or tarry stools, constipation  GENITOURINARY: dysuria, hematuria, frequency  MUSCULOSKELETAL: deformity, neck pain  SKIN: rash, lesion  NEUROLOGIC: headache, numbness, focal weakness  NOTES: All systems reviewed, negative except as described above     Physical Exam:  GENERAL: Alert, oriented , cooperative, sweating  HEAD: normocephalic, atraumatic  SKIN:  dry skin, no lesions.  EYES: PERRL, EOMs intact,  Conjunctiva pink with no erythema or exudates. No scleral icterus.   ENT: No external deformities. Nares patent, mucus membranes moist.  Pharynx clear, uvula midline.   NECK: Supple, without meningismus. Trachea at midline. No lymphadenopathy.  PULMONARY: Clear bilaterally. No crackles, rhonchi, wheezing.  No respiratory distress.  No work of breathing.  CARDIAC: Regular rate and regular rhythm.  Pulses 2+ in radials and dorsal pedal pulses bilaterally.  No murmur, rub, gallop.  No edema.  ABDOMEN: Soft, mild epigastric tender to palpation, active bowel sounds.  No palpable  organomegaly.  No rebound or guarding.  No CVA tenderness.  No pulsatile masses.  Negative Reynolds sign, negative McBurney point  : Exam deferred.  MUSCULOSKELETAL: Full range of motion throughout, no deformity.   NEUROLOGICAL:  no focal neuro deficits.  Strength 5 out of 5 throughout bilateral upper and lower extremities neurovascular intact in bilateral upper and lower extremities  PSYCHIATRIC: Appropriate mood and affect. Calm.         Past Medical History:   Diagnosis Date    Inadequate sleep hygiene     History of difficulty sleeping    Overactive bladder     Overactive bladder    Personal history of colonic polyps     History of colonic polyps    Personal history of other diseases of male genital organs     History of benign prostatic hyperplasia    Personal history of other diseases of the digestive system     History of diverticulitis    Personal history of other diseases of the digestive system     History of hemorrhoids    Personal history of other diseases of the musculoskeletal system and connective tissue     History of degenerative disc disease    Personal history of other specified conditions     History of fatigue      Social History     Socioeconomic History    Marital status:      Spouse name: None    Number of children: None    Years of education: None    Highest education level: None   Occupational History    None   Tobacco Use    Smoking status: Former     Types: Cigarettes    Smokeless tobacco: Never   Vaping Use    Vaping Use: Never used   Substance and Sexual Activity    Alcohol use: Yes     Comment: glass of wine 2-3 times per year    Drug use: Never    Sexual activity: None   Other Topics Concern    None   Social History Narrative    None     Social Determinants of Health     Financial Resource Strain: Low Risk  (10/28/2023)    Overall Financial Resource Strain (CARDIA)     Difficulty of Paying Living Expenses: Not very hard   Food Insecurity: Not on file   Transportation Needs: No  "Transportation Needs (10/28/2023)    PRAPARE - Transportation     Lack of Transportation (Medical): No     Lack of Transportation (Non-Medical): No   Physical Activity: Not on file   Stress: Not on file   Social Connections: Not on file   Intimate Partner Violence: Not on file   Housing Stability: Low Risk  (10/28/2023)    Housing Stability Vital Sign     Unable to Pay for Housing in the Last Year: No     Number of Places Lived in the Last Year: 1     Unstable Housing in the Last Year: No     Current Outpatient Medications   Medication Instructions    albuterol 90 mcg/actuation inhaler 2 puffs, inhalation, Every 6 hours PRN    clobetasol (Temovate) 0.05 % cream Apply sparingly to affected area(s) twice daily for up to one week at a time    ipratropium (Atrovent) 21 mcg (0.03 %) nasal spray use 2 sprays in each nostril in the morning and 2 sprays in the evening and 2 sprays before bedtime    omeprazole (PRILOSEC) 40 mg, oral, Daily    Symbicort 160-4.5 mcg/actuation inhaler 2 puffs, inhalation, 2 times daily, RINSE MOUTH AFTER USE    tamsulosin (FLOMAX) 0.4 mg, oral, Daily     No Known Allergies        ED Triage Vitals [10/27/23 1849]   Temp Heart Rate Resp BP   36.2 °C (97.2 °F) 83 20 (!) 183/89      SpO2 Temp Source Heart Rate Source Patient Position   100 % Temporal Monitor Lying      BP Location FiO2 (%)     Left arm --               Labs and Imaging  XR abdomen 1 view   Final Result   Gastric termination of enteric tube.   Signed by Devon Flor II, MD      XR abdomen 3+ views   Final Result   1.  Nonobstructive bowel gas pattern.   2. Contrast opacification is visualized to the level of the 3rd   segment of the duodenum.        MACRO:   None.        Signed by: Addis Mckeon 10/28/2023 5:41 PM   Dictation workstation:   [id number]      US gallbladder   Final Result   Distended gallbladder with wall thickening, \"sludge\" and probably a   few small gallstones.        MACRO:   None        Signed by: Leander" Demetrimil 10/28/2023 4:49 PM   Dictation workstation:   [id number]      XR abdomen 1 view   Final Result   Enteric tube as described.   Signed by Joby Oneil MD      CT abdomen pelvis w IV contrast   Final Result   1. Small bowel loops in the right lower quadrant are minimally air and   fluid distended: cannot exclude early and/or developing small bowel   obstruction. The stomach is also moderately distended with air and   fluid. Close follow-up advised.   2. Gallbladder is distended with suggestion of small stones. No   gallbladder wall thickening or biliary duct dilatation.   3. Remainder as above.   Signed by Jaylon Tejada MD      Transthoracic Echo (TTE) Complete    (Results Pending)   XR abdomen 1 view    (Results Pending)     Labs Reviewed   COMPREHENSIVE METABOLIC PANEL - Abnormal       Result Value    Glucose 135 (*)     Sodium 140      Potassium 3.8      Chloride 103      Bicarbonate 25      Anion Gap 16      Urea Nitrogen 17      Creatinine 1.15      eGFR 67      Calcium 10.0      Albumin 4.5      Alkaline Phosphatase 68      Total Protein 8.0      AST 22      Bilirubin, Total 0.6      ALT 23     LACTATE - Abnormal    Lactate 2.2 (*)     Narrative:     Venipuncture immediately after or during the administration of Metamizole may lead to falsely low results. Testing should be performed immediately  prior to Metamizole dosing.   BASIC METABOLIC PANEL - Abnormal    Glucose 129 (*)     Sodium 136      Potassium 4.4      Chloride 100      Bicarbonate 26      Anion Gap 14      Urea Nitrogen 12      Creatinine 0.88      eGFR >90      Calcium 8.9     COMPREHENSIVE METABOLIC PANEL - Abnormal    Glucose 122 (*)     Sodium 132 (*)     Potassium 3.8      Chloride 98      Bicarbonate 26      Anion Gap 12      Urea Nitrogen 11      Creatinine 0.85      eGFR >90      Calcium 8.9      Albumin 4.1      Alkaline Phosphatase 60      Total Protein 7.1      AST 30      Bilirubin, Total 1.2      ALT 27     TROPONIN I, HIGH  SENSITIVITY - Abnormal    Troponin I, High Sensitivity 485 (*)     Narrative:     Less than 99th percentile of normal range cutoff-  Female and children under 18 years old <14 ng/L; Male <21 ng/L: Negative  Repeat testing should be performed if clinically indicated.     Female and children under 18 years old 14-50 ng/L; Male 21-50 ng/L:  Consistent with possible cardiac damage and possible increased clinical   risk. Serial measurements may help to assess extent of myocardial damage.     >50 ng/L: Consistent with cardiac damage, increased clinical risk and  myocardial infarction. Serial measurements may help assess extent of   myocardial damage.      NOTE: Children less than 1 year old may have higher baseline troponin   levels and results should be interpreted in conjunction with the overall   clinical context.     NOTE: Troponin I testing is performed using a different   testing methodology at [facility] than at other   [facility]. Direct result comparisons should only   be made within the same method.   TROPONIN I, HIGH SENSITIVITY - Abnormal    Troponin I, High Sensitivity 362 (*)     Narrative:     Less than 99th percentile of normal range cutoff-  Female and children under 18 years old <14 ng/L; Male <21 ng/L: Negative  Repeat testing should be performed if clinically indicated.     Female and children under 18 years old 14-50 ng/L; Male 21-50 ng/L:  Consistent with possible cardiac damage and possible increased clinical   risk. Serial measurements may help to assess extent of myocardial damage.     >50 ng/L: Consistent with cardiac damage, increased clinical risk and  myocardial infarction. Serial measurements may help assess extent of   myocardial damage.      NOTE: Children less than 1 year old may have higher baseline troponin   levels and results should be interpreted in conjunction with the overall   clinical context.     NOTE: Troponin I testing is performed using a different    testing methodology at [facility] than at other   [facility]. Direct result comparisons should only   be made within the same method.   HEPARIN ASSAY - Abnormal    Heparin Unfractionated 1.2 (*)     Narrative:     The therapeutic reference range for UFH may be either 0.3-0.6 IU/mL or 0.3-0.7 IU/mL based on the clinical setting for anticoagulant therapy and the associated nomogram used. For Heparin dosing guidelines based on clinical scenario and Heparin Assay results, please refer to local Pharmacy and the [facility] Guidelines for Anticoagulation Therapy available on the [facility] intranet at: [email or web address]   LIPASE - Normal    Lipase 16      Narrative:     Venipuncture immediately after or during the administration of Metamizole may lead to falsely low results. Testing should be performed immediately prior to Metamizole dosing.   LACTATE - Normal    Lactate 2.0      Narrative:     Venipuncture immediately after or during the administration of Metamizole may lead to falsely low results. Testing should be performed immediately  prior to Metamizole dosing.   SERIAL TROPONIN-INITIAL - Normal    Troponin I, High Sensitivity 4      Narrative:     Less than 99th percentile of normal range cutoff-  Female and children under 18 years old <14 ng/L; Male <21 ng/L: Negative  Repeat testing should be performed if clinically indicated.     Female and children under 18 years old 14-50 ng/L; Male 21-50 ng/L:  Consistent with possible cardiac damage and possible increased clinical   risk. Serial measurements may help to assess extent of myocardial damage.     >50 ng/L: Consistent with cardiac damage, increased clinical risk and  myocardial infarction. Serial measurements may help assess extent of   myocardial damage.      NOTE: Children less than 1 year old may have higher baseline troponin   levels and results should  be interpreted in conjunction with the overall   clinical context.     NOTE: Troponin I testing is performed using a different   testing methodology at [facility] than at other   [facility]. Direct result comparisons should only   be made within the same method.   TROPONIN I, HIGH SENSITIVITY - Normal    Troponin I, High Sensitivity 7      Narrative:     Less than 99th percentile of normal range cutoff-  Female and children under 18 years old <14 ng/L; Male <21 ng/L: Negative  Repeat testing should be performed if clinically indicated.     Female and children under 18 years old 14-50 ng/L; Male 21-50 ng/L:  Consistent with possible cardiac damage and possible increased clinical   risk. Serial measurements may help to assess extent of myocardial damage.     >50 ng/L: Consistent with cardiac damage, increased clinical risk and  myocardial infarction. Serial measurements may help assess extent of   myocardial damage.      NOTE: Children less than 1 year old may have higher baseline troponin   levels and results should be interpreted in conjunction with the overall   clinical context.     NOTE: Troponin I testing is performed using a different   testing methodology at [facility] than at other   [facility]. Direct result comparisons should only   be made within the same method.   CBC - Normal    WBC 8.8      nRBC 0.0      RBC 4.85      Hemoglobin 14.9      Hematocrit 44.8      MCV 92      MCH 30.7      MCHC 33.3      RDW 12.7      Platelets 267      MPV 10.2     MAGNESIUM - Normal    Magnesium 1.74     PHOSPHORUS - Normal    Phosphorus 2.9     COAGULATION SCREEN - Normal    Protime 11.8      INR 1.0      aPTT 31      Narrative:     The APTT is no longer used for monitoring Unfractionated Heparin Therapy. For monitoring Heparin Therapy, use the Heparin Assay.   CBC WITH AUTO DIFFERENTIAL    WBC 5.8      nRBC 0.0      RBC 5.09      Hemoglobin 15.6      Hematocrit 46.7      MCV 92       MCH 30.6      MCHC 33.4      RDW 12.6      Platelets 278      MPV 9.6      Neutrophils % 53.9      Immature Granulocytes %, Automated 0.2      Lymphocytes % 29.4      Monocytes % 9.5      Eosinophils % 6.7      Basophils % 0.3      Neutrophils Absolute 3.13      Immature Granulocytes Absolute, Automated 0.01      Lymphocytes Absolute 1.71      Monocytes Absolute 0.55      Eosinophils Absolute 0.39      Basophils Absolute 0.02     TROPONIN SERIES- (INITIAL, 1 HR)    Narrative:     The following orders were created for panel order Troponin Series, (0, 1 HR).  Procedure                               Abnormality         Status                     ---------                               -----------         ------                     Troponin I, High Sensiti...[[id number]]  Normal              Final result                 Please view results for these tests on the individual orders.   GRAY TOP    Extra Tube Hold for add-ons.     CBC   APTT   HEPARIN ASSAY         Medical Decision Making:     The patient presented for evaluation of abdominal pain.  Differential included but not limited to UTI, bowel obstruction, appendicitis, constipation, viral illness, pancreatitis, cholecystitis.  Patient was given Zofran, morphine for symptomatic relief. Imaging studies if performed were independently reviewed and interpreted by myself and confirmed by radiologist.         External Records Reviewed: I reviewed recent and relevant outside records    ED Course as of 10/29/23 0537   Fri Oct 27, 2023   1905 EKG interpreted by me shows sinus bradycardia with premature ventricular complexes/fusion complexes.  No STEMI.  Rate 51  QRS 82 QTc 381. [WL]   2007 On reevaluation after given morphine still having pain.  We will give him Dilaudid. [WL]   2117 He shows findings of possible developing bowel obstruction.  Consult made to Dr. Sandoval who agrees on admission and admit to medicine.  Advises placing NG. [WL]   2120 Patient had episode  "of hypoxia and bradycardia likely secondary to pain medication given as he was able to be quickly reawoke him but place him on O2 nasal cannula as precaution. [WL]   2123 Consult made to hospitalist who agreed on admission. [WL]      ED Course User Index  [WL] Reyes JORGENSEN DO Ajit         Diagnoses as of 10/29/23 0537   Intestinal obstruction, unspecified cause, unspecified whether partial or complete (CMS/HCC)   Gallbladder sludge   Abnormal CT scan         XR abdomen 1 view   Final Result   Gastric termination of enteric tube.   Signed by Devon Flor II, MD      XR abdomen 3+ views   Final Result   1.  Nonobstructive bowel gas pattern.   2. Contrast opacification is visualized to the level of the 3rd   segment of the duodenum.        MACRO:   None.        Signed by: Addis Mckeon 10/28/2023 5:41 PM   Dictation workstation:   [id number]      US gallbladder   Final Result   Distended gallbladder with wall thickening, \"sludge\" and probably a   few small gallstones.        MACRO:   None        Signed by: Leander Bhatti 10/28/2023 4:49 PM   Dictation workstation:   [id number]      XR abdomen 1 view   Final Result   Enteric tube as described.   Signed by Joby Oneil MD      CT abdomen pelvis w IV contrast   Final Result   1. Small bowel loops in the right lower quadrant are minimally air and   fluid distended: cannot exclude early and/or developing small bowel   obstruction. The stomach is also moderately distended with air and   fluid. Close follow-up advised.   2. Gallbladder is distended with suggestion of small stones. No   gallbladder wall thickening or biliary duct dilatation.   3. Remainder as above.   Signed by Jaylon Tejada MD      Transthoracic Echo (TTE) Complete    (Results Pending)   XR abdomen 1 view    (Results Pending)     Labs Reviewed   COMPREHENSIVE METABOLIC PANEL - Abnormal       Result Value    Glucose 135 (*)     Sodium 140      Potassium 3.8      Chloride 103      Bicarbonate 25      " Anion Gap 16      Urea Nitrogen 17      Creatinine 1.15      eGFR 67      Calcium 10.0      Albumin 4.5      Alkaline Phosphatase 68      Total Protein 8.0      AST 22      Bilirubin, Total 0.6      ALT 23     LACTATE - Abnormal    Lactate 2.2 (*)     Narrative:     Venipuncture immediately after or during the administration of Metamizole may lead to falsely low results. Testing should be performed immediately  prior to Metamizole dosing.   BASIC METABOLIC PANEL - Abnormal    Glucose 129 (*)     Sodium 136      Potassium 4.4      Chloride 100      Bicarbonate 26      Anion Gap 14      Urea Nitrogen 12      Creatinine 0.88      eGFR >90      Calcium 8.9     COMPREHENSIVE METABOLIC PANEL - Abnormal    Glucose 122 (*)     Sodium 132 (*)     Potassium 3.8      Chloride 98      Bicarbonate 26      Anion Gap 12      Urea Nitrogen 11      Creatinine 0.85      eGFR >90      Calcium 8.9      Albumin 4.1      Alkaline Phosphatase 60      Total Protein 7.1      AST 30      Bilirubin, Total 1.2      ALT 27     TROPONIN I, HIGH SENSITIVITY - Abnormal    Troponin I, High Sensitivity 485 (*)     Narrative:     Less than 99th percentile of normal range cutoff-  Female and children under 18 years old <14 ng/L; Male <21 ng/L: Negative  Repeat testing should be performed if clinically indicated.     Female and children under 18 years old 14-50 ng/L; Male 21-50 ng/L:  Consistent with possible cardiac damage and possible increased clinical   risk. Serial measurements may help to assess extent of myocardial damage.     >50 ng/L: Consistent with cardiac damage, increased clinical risk and  myocardial infarction. Serial measurements may help assess extent of   myocardial damage.      NOTE: Children less than 1 year old may have higher baseline troponin   levels and results should be interpreted in conjunction with the overall   clinical context.     NOTE: Troponin I testing is performed using a different   testing methodology at [place]  [facility] than at other   [facility]. Direct result comparisons should only   be made within the same method.   TROPONIN I, HIGH SENSITIVITY - Abnormal    Troponin I, High Sensitivity 362 (*)     Narrative:     Less than 99th percentile of normal range cutoff-  Female and children under 18 years old <14 ng/L; Male <21 ng/L: Negative  Repeat testing should be performed if clinically indicated.     Female and children under 18 years old 14-50 ng/L; Male 21-50 ng/L:  Consistent with possible cardiac damage and possible increased clinical   risk. Serial measurements may help to assess extent of myocardial damage.     >50 ng/L: Consistent with cardiac damage, increased clinical risk and  myocardial infarction. Serial measurements may help assess extent of   myocardial damage.      NOTE: Children less than 1 year old may have higher baseline troponin   levels and results should be interpreted in conjunction with the overall   clinical context.     NOTE: Troponin I testing is performed using a different   testing methodology at [facility] than at [facility]. Direct result comparisons should only   be made within the same method.   HEPARIN ASSAY - Abnormal    Heparin Unfractionated 1.2 (*)     Narrative:     The therapeutic reference range for UFH may be either 0.3-0.6 IU/mL or 0.3-0.7 IU/mL based on the clinical setting for anticoagulant therapy and the associated nomogram used. For Heparin dosing guidelines based on clinical scenario and Heparin Assay results, please refer to local Pharmacy and the [facility] Guidelines for Anticoagulation Therapy available on the [facility] intranet at: [email or web address]   LIPASE - Normal    Lipase 16      Narrative:     Venipuncture immediately after or during the administration of Metamizole may lead to falsely low results. Testing should be performed  immediately prior to Metamizole dosing.   LACTATE - Normal    Lactate 2.0      Narrative:     Venipuncture immediately after or during the administration of Metamizole may lead to falsely low results. Testing should be performed immediately  prior to Metamizole dosing.   SERIAL TROPONIN-INITIAL - Normal    Troponin I, High Sensitivity 4      Narrative:     Less than 99th percentile of normal range cutoff-  Female and children under 18 years old <14 ng/L; Male <21 ng/L: Negative  Repeat testing should be performed if clinically indicated.     Female and children under 18 years old 14-50 ng/L; Male 21-50 ng/L:  Consistent with possible cardiac damage and possible increased clinical   risk. Serial measurements may help to assess extent of myocardial damage.     >50 ng/L: Consistent with cardiac damage, increased clinical risk and  myocardial infarction. Serial measurements may help assess extent of   myocardial damage.      NOTE: Children less than 1 year old may have higher baseline troponin   levels and results should be interpreted in conjunction with the overall   clinical context.     NOTE: Troponin I testing is performed using a different   testing methodology at [facility] than at other   [facility]. Direct result comparisons should only   be made within the same method.   TROPONIN I, HIGH SENSITIVITY - Normal    Troponin I, High Sensitivity 7      Narrative:     Less than 99th percentile of normal range cutoff-  Female and children under 18 years old <14 ng/L; Male <21 ng/L: Negative  Repeat testing should be performed if clinically indicated.     Female and children under 18 years old 14-50 ng/L; Male 21-50 ng/L:  Consistent with possible cardiac damage and possible increased clinical   risk. Serial measurements may help to assess extent of myocardial damage.     >50 ng/L: Consistent with cardiac damage, increased clinical risk and  myocardial infarction. Serial measurements may help assess  extent of   myocardial damage.      NOTE: Children less than 1 year old may have higher baseline troponin   levels and results should be interpreted in conjunction with the overall   clinical context.     NOTE: Troponin I testing is performed using a different   testing methodology at [facility] than at other   [facility]. Direct result comparisons should only   be made within the same method.   CBC - Normal    WBC 8.8      nRBC 0.0      RBC 4.85      Hemoglobin 14.9      Hematocrit 44.8      MCV 92      MCH 30.7      MCHC 33.3      RDW 12.7      Platelets 267      MPV 10.2     MAGNESIUM - Normal    Magnesium 1.74     PHOSPHORUS - Normal    Phosphorus 2.9     COAGULATION SCREEN - Normal    Protime 11.8      INR 1.0      aPTT 31      Narrative:     The APTT is no longer used for monitoring Unfractionated Heparin Therapy. For monitoring Heparin Therapy, use the Heparin Assay.   CBC WITH AUTO DIFFERENTIAL    WBC 5.8      nRBC 0.0      RBC 5.09      Hemoglobin 15.6      Hematocrit 46.7      MCV 92      MCH 30.6      MCHC 33.4      RDW 12.6      Platelets 278      MPV 9.6      Neutrophils % 53.9      Immature Granulocytes %, Automated 0.2      Lymphocytes % 29.4      Monocytes % 9.5      Eosinophils % 6.7      Basophils % 0.3      Neutrophils Absolute 3.13      Immature Granulocytes Absolute, Automated 0.01      Lymphocytes Absolute 1.71      Monocytes Absolute 0.55      Eosinophils Absolute 0.39      Basophils Absolute 0.02     TROPONIN SERIES- (INITIAL, 1 HR)    Narrative:     The following orders were created for panel order Troponin Series, (0, 1 HR).  Procedure                               Abnormality         Status                     ---------                               -----------         ------                     Troponin I, High Sensiti...[[id number]]  Normal              Final result                 Please view results for these tests on the individual orders.   GRAY TOP    Extra Tube  Hold for add-ons.     CBC   APTT   HEPARIN ASSAY           Procedure  Procedures     Patient requires continued workup and management of their symptoms and will be admitted to the hospital for further evaluation and treatment.        I discussed the differential, results and plan with the patient and/or family/friend/caregiver if present.          Note: This note was dictated by speech recognition. Minor errors in transcription may be present.          Reyes Fong, DO  10/29/23 0537

## 2023-10-27 NOTE — ED TRIAGE NOTES
Pt arrives with c/o epigastric pain, nausea and dry heaves beginning approx 1 hour PTA. Pt reports having 1 small bowel movement today. Pt currently grimacing.

## 2023-10-28 ENCOUNTER — APPOINTMENT (OUTPATIENT)
Dept: RADIOLOGY | Facility: HOSPITAL | Age: 73
DRG: 418 | End: 2023-10-28
Payer: MEDICARE

## 2023-10-28 ENCOUNTER — HOSPITAL ENCOUNTER (OUTPATIENT)
Dept: CARDIOLOGY | Facility: HOSPITAL | Age: 73
Discharge: HOME | End: 2023-10-28
Payer: MEDICARE

## 2023-10-28 ENCOUNTER — ANCILLARY PROCEDURE (OUTPATIENT)
Dept: RADIOLOGY | Facility: HOSPITAL | Age: 73
DRG: 418 | End: 2023-10-28
Payer: MEDICARE

## 2023-10-28 PROBLEM — K81.9 CHOLECYSTITIS: Status: ACTIVE | Noted: 2023-10-27

## 2023-10-28 LAB
ALBUMIN SERPL BCP-MCNC: 4.1 G/DL (ref 3.4–5)
ALP SERPL-CCNC: 60 U/L (ref 33–136)
ALT SERPL W P-5'-P-CCNC: 27 U/L (ref 10–52)
ANION GAP SERPL CALC-SCNC: 12 MMOL/L (ref 10–20)
ANION GAP SERPL CALC-SCNC: 14 MMOL/L (ref 10–20)
APTT PPP: 31 SECONDS (ref 27–38)
AST SERPL W P-5'-P-CCNC: 30 U/L (ref 9–39)
BILIRUB SERPL-MCNC: 1.2 MG/DL (ref 0–1.2)
BUN SERPL-MCNC: 11 MG/DL (ref 6–23)
BUN SERPL-MCNC: 12 MG/DL (ref 6–23)
CALCIUM SERPL-MCNC: 8.9 MG/DL (ref 8.6–10.3)
CALCIUM SERPL-MCNC: 8.9 MG/DL (ref 8.6–10.3)
CARDIAC TROPONIN I PNL SERPL HS: 485 NG/L (ref 0–20)
CHLORIDE SERPL-SCNC: 100 MMOL/L (ref 98–107)
CHLORIDE SERPL-SCNC: 98 MMOL/L (ref 98–107)
CO2 SERPL-SCNC: 26 MMOL/L (ref 21–32)
CO2 SERPL-SCNC: 26 MMOL/L (ref 21–32)
CREAT SERPL-MCNC: 0.85 MG/DL (ref 0.5–1.3)
CREAT SERPL-MCNC: 0.88 MG/DL (ref 0.5–1.3)
ERYTHROCYTE [DISTWIDTH] IN BLOOD BY AUTOMATED COUNT: 12.7 % (ref 11.5–14.5)
GFR SERPL CREATININE-BSD FRML MDRD: >90 ML/MIN/1.73M*2
GFR SERPL CREATININE-BSD FRML MDRD: >90 ML/MIN/1.73M*2
GLUCOSE SERPL-MCNC: 122 MG/DL (ref 74–99)
GLUCOSE SERPL-MCNC: 129 MG/DL (ref 74–99)
HCT VFR BLD AUTO: 44.8 % (ref 41–52)
HGB BLD-MCNC: 14.9 G/DL (ref 13.5–17.5)
HOLD SPECIMEN: NORMAL
INR PPP: 1 (ref 0.9–1.1)
MAGNESIUM SERPL-MCNC: 1.74 MG/DL (ref 1.6–2.4)
MCH RBC QN AUTO: 30.7 PG (ref 26–34)
MCHC RBC AUTO-ENTMCNC: 33.3 G/DL (ref 32–36)
MCV RBC AUTO: 92 FL (ref 80–100)
NRBC BLD-RTO: 0 /100 WBCS (ref 0–0)
PHOSPHATE SERPL-MCNC: 2.9 MG/DL (ref 2.5–4.9)
PLATELET # BLD AUTO: 267 X10*3/UL (ref 150–450)
PMV BLD AUTO: 10.2 FL (ref 7.5–11.5)
POTASSIUM SERPL-SCNC: 3.8 MMOL/L (ref 3.5–5.3)
POTASSIUM SERPL-SCNC: 4.4 MMOL/L (ref 3.5–5.3)
PROT SERPL-MCNC: 7.1 G/DL (ref 6.4–8.2)
PROTHROMBIN TIME: 11.8 SECONDS (ref 9.8–12.8)
RBC # BLD AUTO: 4.85 X10*6/UL (ref 4.5–5.9)
SODIUM SERPL-SCNC: 132 MMOL/L (ref 136–145)
SODIUM SERPL-SCNC: 136 MMOL/L (ref 136–145)
WBC # BLD AUTO: 8.8 X10*3/UL (ref 4.4–11.3)

## 2023-10-28 PROCEDURE — 99223 1ST HOSP IP/OBS HIGH 75: CPT | Performed by: SURGERY

## 2023-10-28 PROCEDURE — 84075 ASSAY ALKALINE PHOSPHATASE: CPT | Performed by: NURSE PRACTITIONER

## 2023-10-28 PROCEDURE — 84100 ASSAY OF PHOSPHORUS: CPT | Performed by: SURGERY

## 2023-10-28 PROCEDURE — 96372 THER/PROPH/DIAG INJ SC/IM: CPT | Performed by: STUDENT IN AN ORGANIZED HEALTH CARE EDUCATION/TRAINING PROGRAM

## 2023-10-28 PROCEDURE — 93005 ELECTROCARDIOGRAM TRACING: CPT

## 2023-10-28 PROCEDURE — 2550000001 HC RX 255 CONTRASTS: Performed by: SURGERY

## 2023-10-28 PROCEDURE — 74021 RADEX ABDOMEN 3+ VIEWS: CPT | Performed by: RADIOLOGY

## 2023-10-28 PROCEDURE — 76705 ECHO EXAM OF ABDOMEN: CPT

## 2023-10-28 PROCEDURE — 74018 RADEX ABDOMEN 1 VIEW: CPT | Mod: FOREIGN READ | Performed by: RADIOLOGY

## 2023-10-28 PROCEDURE — 2500000004 HC RX 250 GENERAL PHARMACY W/ HCPCS (ALT 636 FOR OP/ED): Performed by: STUDENT IN AN ORGANIZED HEALTH CARE EDUCATION/TRAINING PROGRAM

## 2023-10-28 PROCEDURE — 2500000002 HC RX 250 W HCPCS SELF ADMINISTERED DRUGS (ALT 637 FOR MEDICARE OP, ALT 636 FOR OP/ED): Performed by: STUDENT IN AN ORGANIZED HEALTH CARE EDUCATION/TRAINING PROGRAM

## 2023-10-28 PROCEDURE — 83735 ASSAY OF MAGNESIUM: CPT | Performed by: NURSE PRACTITIONER

## 2023-10-28 PROCEDURE — 2500000004 HC RX 250 GENERAL PHARMACY W/ HCPCS (ALT 636 FOR OP/ED): Performed by: SURGERY

## 2023-10-28 PROCEDURE — 36415 COLL VENOUS BLD VENIPUNCTURE: CPT | Performed by: NURSE PRACTITIONER

## 2023-10-28 PROCEDURE — 84484 ASSAY OF TROPONIN QUANT: CPT | Performed by: STUDENT IN AN ORGANIZED HEALTH CARE EDUCATION/TRAINING PROGRAM

## 2023-10-28 PROCEDURE — C9113 INJ PANTOPRAZOLE SODIUM, VIA: HCPCS | Performed by: STUDENT IN AN ORGANIZED HEALTH CARE EDUCATION/TRAINING PROGRAM

## 2023-10-28 PROCEDURE — 85730 THROMBOPLASTIN TIME PARTIAL: CPT | Performed by: STUDENT IN AN ORGANIZED HEALTH CARE EDUCATION/TRAINING PROGRAM

## 2023-10-28 PROCEDURE — 94640 AIRWAY INHALATION TREATMENT: CPT

## 2023-10-28 PROCEDURE — 94664 DEMO&/EVAL PT USE INHALER: CPT

## 2023-10-28 PROCEDURE — 80048 BASIC METABOLIC PNL TOTAL CA: CPT | Mod: CCI | Performed by: STUDENT IN AN ORGANIZED HEALTH CARE EDUCATION/TRAINING PROGRAM

## 2023-10-28 PROCEDURE — 85027 COMPLETE CBC AUTOMATED: CPT | Performed by: STUDENT IN AN ORGANIZED HEALTH CARE EDUCATION/TRAINING PROGRAM

## 2023-10-28 PROCEDURE — 2500000001 HC RX 250 WO HCPCS SELF ADMINISTERED DRUGS (ALT 637 FOR MEDICARE OP): Performed by: SURGERY

## 2023-10-28 PROCEDURE — 85610 PROTHROMBIN TIME: CPT | Performed by: STUDENT IN AN ORGANIZED HEALTH CARE EDUCATION/TRAINING PROGRAM

## 2023-10-28 PROCEDURE — 36415 COLL VENOUS BLD VENIPUNCTURE: CPT | Performed by: STUDENT IN AN ORGANIZED HEALTH CARE EDUCATION/TRAINING PROGRAM

## 2023-10-28 PROCEDURE — 2500000004 HC RX 250 GENERAL PHARMACY W/ HCPCS (ALT 636 FOR OP/ED): Performed by: NURSE PRACTITIONER

## 2023-10-28 PROCEDURE — 74018 RADEX ABDOMEN 1 VIEW: CPT | Mod: FR

## 2023-10-28 PROCEDURE — 99232 SBSQ HOSP IP/OBS MODERATE 35: CPT | Performed by: NURSE PRACTITIONER

## 2023-10-28 PROCEDURE — 76705 ECHO EXAM OF ABDOMEN: CPT | Performed by: RADIOLOGY

## 2023-10-28 PROCEDURE — 74021 RADEX ABDOMEN 3+ VIEWS: CPT | Mod: FY

## 2023-10-28 PROCEDURE — 2060000001 HC INTERMEDIATE ICU ROOM DAILY

## 2023-10-28 PROCEDURE — 74019 RADEX ABDOMEN 2 VIEWS: CPT

## 2023-10-28 PROCEDURE — 2500000005 HC RX 250 GENERAL PHARMACY W/O HCPCS: Performed by: STUDENT IN AN ORGANIZED HEALTH CARE EDUCATION/TRAINING PROGRAM

## 2023-10-28 RX ORDER — POLYETHYLENE GLYCOL 3350 17 G/17G
17 POWDER, FOR SOLUTION ORAL 3 TIMES DAILY
Status: DISCONTINUED | OUTPATIENT
Start: 2023-10-28 | End: 2023-10-29

## 2023-10-28 RX ORDER — IPRATROPIUM BROMIDE AND ALBUTEROL SULFATE 2.5; .5 MG/3ML; MG/3ML
3 SOLUTION RESPIRATORY (INHALATION)
Status: DISCONTINUED | OUTPATIENT
Start: 2023-10-28 | End: 2023-10-31

## 2023-10-28 RX ORDER — HEPARIN SODIUM 10000 [USP'U]/100ML
0-4500 INJECTION, SOLUTION INTRAVENOUS CONTINUOUS
Status: DISCONTINUED | OUTPATIENT
Start: 2023-10-28 | End: 2023-10-29

## 2023-10-28 RX ORDER — ONDANSETRON 4 MG/1
4 TABLET, FILM COATED ORAL EVERY 8 HOURS PRN
Status: DISCONTINUED | OUTPATIENT
Start: 2023-10-28 | End: 2023-10-28

## 2023-10-28 RX ORDER — ALBUTEROL SULFATE 0.83 MG/ML
2.5 SOLUTION RESPIRATORY (INHALATION) EVERY 2 HOUR PRN
Status: DISCONTINUED | OUTPATIENT
Start: 2023-10-28 | End: 2023-11-01 | Stop reason: HOSPADM

## 2023-10-28 RX ORDER — KETOROLAC TROMETHAMINE 15 MG/ML
15 INJECTION, SOLUTION INTRAMUSCULAR; INTRAVENOUS EVERY 6 HOURS
Status: DISCONTINUED | OUTPATIENT
Start: 2023-10-28 | End: 2023-10-29

## 2023-10-28 RX ORDER — DEXTROSE MONOHYDRATE, SODIUM CHLORIDE, AND POTASSIUM CHLORIDE 50; 1.49; 4.5 G/1000ML; G/1000ML; G/1000ML
100 INJECTION, SOLUTION INTRAVENOUS CONTINUOUS
Status: DISCONTINUED | OUTPATIENT
Start: 2023-10-28 | End: 2023-10-30

## 2023-10-28 RX ORDER — METOCLOPRAMIDE HYDROCHLORIDE 5 MG/ML
5 INJECTION INTRAMUSCULAR; INTRAVENOUS EVERY 6 HOURS SCHEDULED
Status: DISCONTINUED | OUTPATIENT
Start: 2023-10-28 | End: 2023-11-01 | Stop reason: HOSPADM

## 2023-10-28 RX ORDER — DILTIAZEM HCL/D5W 125 MG/125
5-15 PLASTIC BAG, INJECTION (ML) INTRAVENOUS CONTINUOUS
Status: DISCONTINUED | OUTPATIENT
Start: 2023-10-28 | End: 2023-10-31

## 2023-10-28 RX ORDER — SUCRALFATE 1 G/10ML
1 SUSPENSION ORAL EVERY 6 HOURS SCHEDULED
Status: DISCONTINUED | OUTPATIENT
Start: 2023-10-28 | End: 2023-11-01 | Stop reason: HOSPADM

## 2023-10-28 RX ORDER — POLYETHYLENE GLYCOL 3350 17 G/17G
17 POWDER, FOR SOLUTION ORAL DAILY
Status: DISCONTINUED | OUTPATIENT
Start: 2023-10-28 | End: 2023-10-28

## 2023-10-28 RX ORDER — ACETAMINOPHEN 160 MG/5ML
650 SOLUTION ORAL EVERY 6 HOURS
Status: DISCONTINUED | OUTPATIENT
Start: 2023-10-28 | End: 2023-10-29

## 2023-10-28 RX ORDER — METOPROLOL TARTRATE 1 MG/ML
INJECTION, SOLUTION INTRAVENOUS
Status: DISPENSED
Start: 2023-10-28 | End: 2023-10-29

## 2023-10-28 RX ORDER — PANTOPRAZOLE SODIUM 40 MG/10ML
40 INJECTION, POWDER, LYOPHILIZED, FOR SOLUTION INTRAVENOUS 2 TIMES DAILY
Status: DISCONTINUED | OUTPATIENT
Start: 2023-10-28 | End: 2023-11-01 | Stop reason: HOSPADM

## 2023-10-28 RX ORDER — METOPROLOL TARTRATE 1 MG/ML
5 INJECTION, SOLUTION INTRAVENOUS ONCE
Status: COMPLETED | OUTPATIENT
Start: 2023-10-28 | End: 2023-10-28

## 2023-10-28 RX ORDER — ONDANSETRON HYDROCHLORIDE 2 MG/ML
4 INJECTION, SOLUTION INTRAVENOUS EVERY 6 HOURS PRN
Status: DISCONTINUED | OUTPATIENT
Start: 2023-10-28 | End: 2023-11-01 | Stop reason: HOSPADM

## 2023-10-28 RX ORDER — METOPROLOL TARTRATE 1 MG/ML
5 INJECTION, SOLUTION INTRAVENOUS
Status: DISCONTINUED | OUTPATIENT
Start: 2023-10-28 | End: 2023-11-01 | Stop reason: HOSPADM

## 2023-10-28 RX ORDER — HEPARIN SODIUM 5000 [USP'U]/ML
80 INJECTION, SOLUTION INTRAVENOUS; SUBCUTANEOUS ONCE
Status: COMPLETED | OUTPATIENT
Start: 2023-10-28 | End: 2023-10-28

## 2023-10-28 RX ORDER — MORPHINE SULFATE 4 MG/ML
4 INJECTION INTRAVENOUS EVERY 4 HOURS PRN
Status: DISCONTINUED | OUTPATIENT
Start: 2023-10-28 | End: 2023-10-28

## 2023-10-28 RX ORDER — MORPHINE SULFATE 2 MG/ML
2 INJECTION, SOLUTION INTRAMUSCULAR; INTRAVENOUS EVERY 4 HOURS PRN
Status: DISCONTINUED | OUTPATIENT
Start: 2023-10-28 | End: 2023-10-28

## 2023-10-28 RX ADMIN — SODIUM CHLORIDE, POTASSIUM CHLORIDE, SODIUM LACTATE AND CALCIUM CHLORIDE 500 ML: 600; 310; 30; 20 INJECTION, SOLUTION INTRAVENOUS at 23:38

## 2023-10-28 RX ADMIN — IPRATROPIUM BROMIDE AND ALBUTEROL SULFATE 3 ML: 2.5; .5 SOLUTION RESPIRATORY (INHALATION) at 13:16

## 2023-10-28 RX ADMIN — KETOROLAC TROMETHAMINE 15 MG: 15 INJECTION, SOLUTION INTRAMUSCULAR; INTRAVENOUS at 15:09

## 2023-10-28 RX ADMIN — MORPHINE SULFATE 1 MG: 2 INJECTION, SOLUTION INTRAMUSCULAR; INTRAVENOUS at 09:50

## 2023-10-28 RX ADMIN — IPRATROPIUM BROMIDE AND ALBUTEROL SULFATE 3 ML: 2.5; .5 SOLUTION RESPIRATORY (INHALATION) at 08:07

## 2023-10-28 RX ADMIN — POTASSIUM CHLORIDE, DEXTROSE MONOHYDRATE AND SODIUM CHLORIDE 100 ML/HR: 150; 5; 450 INJECTION, SOLUTION INTRAVENOUS at 15:09

## 2023-10-28 RX ADMIN — HEPARIN SODIUM 1800 UNITS/HR: 10000 INJECTION, SOLUTION INTRAVENOUS at 23:29

## 2023-10-28 RX ADMIN — PANTOPRAZOLE SODIUM 40 MG: 40 INJECTION, POWDER, FOR SOLUTION INTRAVENOUS at 23:28

## 2023-10-28 RX ADMIN — SUCRALFATE ORAL 1 G: 1 SUSPENSION ORAL at 15:09

## 2023-10-28 RX ADMIN — METOPROLOL TARTRATE 5 MG: 5 INJECTION INTRAVENOUS at 20:33

## 2023-10-28 RX ADMIN — SUCRALFATE ORAL 1 G: 1 SUSPENSION ORAL at 23:43

## 2023-10-28 RX ADMIN — SUCRALFATE ORAL 1 G: 1 SUSPENSION ORAL at 18:07

## 2023-10-28 RX ADMIN — METOPROLOL TARTRATE 5 MG: 5 INJECTION INTRAVENOUS at 21:16

## 2023-10-28 RX ADMIN — SODIUM CHLORIDE 75 ML/HR: 9 INJECTION, SOLUTION INTRAVENOUS at 13:10

## 2023-10-28 RX ADMIN — BUDESONIDE 0.5 MG: 0.5 INHALANT RESPIRATORY (INHALATION) at 08:10

## 2023-10-28 RX ADMIN — PANTOPRAZOLE SODIUM 40 MG: 40 INJECTION, POWDER, FOR SOLUTION INTRAVENOUS at 06:16

## 2023-10-28 RX ADMIN — HYDROMORPHONE HYDROCHLORIDE 0.4 MG: 1 INJECTION, SOLUTION INTRAMUSCULAR; INTRAVENOUS; SUBCUTANEOUS at 15:09

## 2023-10-28 RX ADMIN — HEPARIN SODIUM 6500 UNITS: 5000 INJECTION, SOLUTION INTRAVENOUS; SUBCUTANEOUS at 23:15

## 2023-10-28 RX ADMIN — ACETAMINOPHEN 650 MG: 160 SOLUTION ORAL at 15:09

## 2023-10-28 RX ADMIN — DIATRIZOATE MEGLUMINE AND DIATRIZOATE SODIUM 120 ML: 660; 100 LIQUID ORAL; RECTAL at 15:00

## 2023-10-28 RX ADMIN — METOCLOPRAMIDE 5 MG: 5 INJECTION, SOLUTION INTRAMUSCULAR; INTRAVENOUS at 23:42

## 2023-10-28 RX ADMIN — ONDANSETRON 4 MG: 2 INJECTION INTRAMUSCULAR; INTRAVENOUS at 12:16

## 2023-10-28 RX ADMIN — DILTIAZEM HCL 125 MG/125ML IN DEXTROSE 5% IV SOLN (1 MG/ML) 5 MG/HR: 125-5/125 SOLUTION at 23:24

## 2023-10-28 RX ADMIN — ENOXAPARIN SODIUM 40 MG: 40 INJECTION SUBCUTANEOUS at 00:22

## 2023-10-28 RX ADMIN — SODIUM CHLORIDE 75 ML/HR: 9 INJECTION, SOLUTION INTRAVENOUS at 00:22

## 2023-10-28 SDOH — SOCIAL STABILITY: SOCIAL INSECURITY: DO YOU FEEL ANYONE HAS EXPLOITED OR TAKEN ADVANTAGE OF YOU FINANCIALLY OR OF YOUR PERSONAL PROPERTY?: NO

## 2023-10-28 SDOH — SOCIAL STABILITY: SOCIAL INSECURITY: DO YOU FEEL UNSAFE GOING BACK TO THE PLACE WHERE YOU ARE LIVING?: NO

## 2023-10-28 SDOH — SOCIAL STABILITY: SOCIAL INSECURITY: DOES ANYONE TRY TO KEEP YOU FROM HAVING/CONTACTING OTHER FRIENDS OR DOING THINGS OUTSIDE YOUR HOME?: NO

## 2023-10-28 SDOH — SOCIAL STABILITY: SOCIAL INSECURITY: ABUSE: ADULT

## 2023-10-28 SDOH — SOCIAL STABILITY: SOCIAL INSECURITY: WERE YOU ABLE TO COMPLETE ALL THE BEHAVIORAL HEALTH SCREENINGS?: YES

## 2023-10-28 SDOH — SOCIAL STABILITY: SOCIAL INSECURITY: HAVE YOU HAD THOUGHTS OF HARMING ANYONE ELSE?: NO

## 2023-10-28 SDOH — SOCIAL STABILITY: SOCIAL INSECURITY: ARE YOU OR HAVE YOU BEEN THREATENED OR ABUSED PHYSICALLY, EMOTIONALLY, OR SEXUALLY BY ANYONE?: NO

## 2023-10-28 SDOH — SOCIAL STABILITY: SOCIAL INSECURITY: HAS ANYONE EVER THREATENED TO HURT YOUR FAMILY OR YOUR PETS?: NO

## 2023-10-28 SDOH — SOCIAL STABILITY: SOCIAL INSECURITY: ARE THERE ANY APPARENT SIGNS OF INJURIES/BEHAVIORS THAT COULD BE RELATED TO ABUSE/NEGLECT?: NO

## 2023-10-28 ASSESSMENT — ACTIVITIES OF DAILY LIVING (ADL)
GROOMING: INDEPENDENT
LACK_OF_TRANSPORTATION: NO
FEEDING YOURSELF: INDEPENDENT
ADEQUATE_TO_COMPLETE_ADL: YES
BATHING: INDEPENDENT
LACK_OF_TRANSPORTATION: NO
HEARING - RIGHT EAR: FUNCTIONAL
JUDGMENT_ADEQUATE_SAFELY_COMPLETE_DAILY_ACTIVITIES: YES
DRESSING YOURSELF: INDEPENDENT
TOILETING: INDEPENDENT
WALKS IN HOME: INDEPENDENT
PATIENT'S MEMORY ADEQUATE TO SAFELY COMPLETE DAILY ACTIVITIES?: YES
HEARING - LEFT EAR: FUNCTIONAL

## 2023-10-28 ASSESSMENT — PAIN SCALES - GENERAL
PAINLEVEL_OUTOF10: 7
PAINLEVEL_OUTOF10: 1
PAINLEVEL_OUTOF10: 1
PAINLEVEL_OUTOF10: 6

## 2023-10-28 ASSESSMENT — COGNITIVE AND FUNCTIONAL STATUS - GENERAL
DAILY ACTIVITIY SCORE: 24
DAILY ACTIVITIY SCORE: 24
PATIENT BASELINE BEDBOUND: NO
MOBILITY SCORE: 24
MOBILITY SCORE: 24

## 2023-10-28 ASSESSMENT — LIFESTYLE VARIABLES
HOW MANY STANDARD DRINKS CONTAINING ALCOHOL DO YOU HAVE ON A TYPICAL DAY: 1 OR 2
HOW OFTEN DO YOU HAVE A DRINK CONTAINING ALCOHOL: MONTHLY OR LESS
HOW OFTEN DO YOU HAVE 6 OR MORE DRINKS ON ONE OCCASION: LESS THAN MONTHLY
AUDIT-C TOTAL SCORE: 2
SKIP TO QUESTIONS 9-10: 0
AUDIT-C TOTAL SCORE: 2

## 2023-10-28 ASSESSMENT — PAIN - FUNCTIONAL ASSESSMENT: PAIN_FUNCTIONAL_ASSESSMENT: 0-10

## 2023-10-28 ASSESSMENT — PATIENT HEALTH QUESTIONNAIRE - PHQ9
SUM OF ALL RESPONSES TO PHQ9 QUESTIONS 1 & 2: 0
1. LITTLE INTEREST OR PLEASURE IN DOING THINGS: NOT AT ALL
2. FEELING DOWN, DEPRESSED OR HOPELESS: NOT AT ALL

## 2023-10-28 ASSESSMENT — ENCOUNTER SYMPTOMS
VOMITING: 1
NAUSEA: 1
ABDOMINAL PAIN: 1

## 2023-10-28 NOTE — PROGRESS NOTES
10/28/23 1243   Discharge Planning   Living Arrangements Spouse/significant other   Support Systems Spouse/significant other   Type of Residence Private residence   Who is requesting discharge planning? Provider   Patient expects to be discharged to: Home   Does the patient need discharge transport arranged? No   Financial Resource Strain   How hard is it for you to pay for the very basics like food, housing, medical care, and heating? Not very   Housing Stability   In the last 12 months, was there a time when you were not able to pay the mortgage or rent on time? N   In the last 12 months, was there a time when you did not have a steady place to sleep or slept in a shelter (including now)? N   Transportation Needs   In the past 12 months, has lack of transportation kept you from medical appointments or from getting medications? no   In the past 12 months, has lack of transportation kept you from meetings, work, or from getting things needed for daily living? No

## 2023-10-28 NOTE — PROGRESS NOTES
Santy Siddiqi is a 73 y.o. male on day 1 of admission presenting with Intestinal obstruction, unspecified cause, unspecified whether partial or complete (CMS/HCC).      Subjective   Seen and examined in his room this AM. Still having a lot of abdominal discomfort in upper quadrants but has improved. Belching but no BM or passing gas to this point. He denies chest pain, breathing difficulties, fever, or chills.         Objective     Last Recorded Vitals  /78 (BP Location: Right arm, Patient Position: Lying)   Pulse 62   Temp 37 °C (98.6 °F) (Tympanic)   Resp 18   Wt 81.6 kg (180 lb)   SpO2 98%   Intake/Output last 3 Shifts:    Intake/Output Summary (Last 24 hours) at 10/28/2023 1318  Last data filed at 10/28/2023 0700  Gross per 24 hour   Intake 497.5 ml   Output 600 ml   Net -102.5 ml       Admission Weight  Weight: 81.6 kg (180 lb) (10/27/23 1849)    Daily Weight  10/28/23 : 81.6 kg (180 lb)    Image Results  CT A/P:  1. Small bowel loops in the right lower quadrant are minimally air and  fluid distended: cannot exclude early and/or developing small bowel  obstruction. The stomach is also moderately distended with air and  fluid. Close follow-up advised.  2. Gallbladder is distended with suggestion of small stones. No  gallbladder wall thickening or biliary duct dilatation.      Physical Exam  Constitutional: A&O x 3; mild distress related to pain; calm and cooperative  Eyes: EOM's intact  HEENT: Normocephalic, Atraumatic. Oral mucosa moist.   Neck: Supple. No JVD, lymphadenopathy.   Lungs: CTAB with fair air movement. Respirations even and unlabored on room air.   Heart: RRR  Abdomen: Softly distended with tenderness, +BS  MS/Extremities: LOVE equally x 4. No edema. Peripheral pulses intact bilaterally.   Neuro: A&O x3; no focal deficits; gross motor and sensation intact.   Skin: Warm and dry. No rashes or lesions  Psych: Normal affect.      Relevant Results  Scheduled medications  budesonide, 0.5 mg,  nebulization, BID  enoxaparin, 40 mg, subcutaneous, q24h  ipratropium-albuteroL, 3 mL, nebulization, TID  pantoprazole, 40 mg, oral, Daily before breakfast   Or  pantoprazole, 40 mg, intravenous, Daily before breakfast  polyethylene glycol, 17 g, oral, Daily  tamsulosin, 0.4 mg, oral, Daily      Continuous medications  sodium chloride 0.9%, 75 mL/hr, Last Rate: 75 mL/hr (10/28/23 1310)      PRN medications  PRN medications: acetaminophen **OR** acetaminophen **OR** acetaminophen, albuterol, morphine, morphine, ondansetron **OR** ondansetron, oxygen     Results for orders placed or performed during the hospital encounter of 10/27/23 (from the past 24 hour(s))   CBC and Auto Differential   Result Value Ref Range    WBC 5.8 4.4 - 11.3 x10*3/uL    nRBC 0.0 0.0 - 0.0 /100 WBCs    RBC 5.09 4.50 - 5.90 x10*6/uL    Hemoglobin 15.6 13.5 - 17.5 g/dL    Hematocrit 46.7 41.0 - 52.0 %    MCV 92 80 - 100 fL    MCH 30.6 26.0 - 34.0 pg    MCHC 33.4 32.0 - 36.0 g/dL    RDW 12.6 11.5 - 14.5 %    Platelets 278 150 - 450 x10*3/uL    MPV 9.6 7.5 - 11.5 fL    Neutrophils % 53.9 40.0 - 80.0 %    Immature Granulocytes %, Automated 0.2 0.0 - 0.9 %    Lymphocytes % 29.4 13.0 - 44.0 %    Monocytes % 9.5 2.0 - 10.0 %    Eosinophils % 6.7 0.0 - 6.0 %    Basophils % 0.3 0.0 - 2.0 %    Neutrophils Absolute 3.13 1.60 - 5.50 x10*3/uL    Immature Granulocytes Absolute, Automated 0.01 0.00 - 0.50 x10*3/uL    Lymphocytes Absolute 1.71 0.80 - 3.00 x10*3/uL    Monocytes Absolute 0.55 0.05 - 0.80 x10*3/uL    Eosinophils Absolute 0.39 0.00 - 0.40 x10*3/uL    Basophils Absolute 0.02 0.00 - 0.10 x10*3/uL   Comprehensive metabolic panel   Result Value Ref Range    Glucose 135 (H) 74 - 99 mg/dL    Sodium 140 136 - 145 mmol/L    Potassium 3.8 3.5 - 5.3 mmol/L    Chloride 103 98 - 107 mmol/L    Bicarbonate 25 21 - 32 mmol/L    Anion Gap 16 10 - 20 mmol/L    Urea Nitrogen 17 6 - 23 mg/dL    Creatinine 1.15 0.50 - 1.30 mg/dL    eGFR 67 >60 mL/min/1.73m*2     Calcium 10.0 8.6 - 10.3 mg/dL    Albumin 4.5 3.4 - 5.0 g/dL    Alkaline Phosphatase 68 33 - 136 U/L    Total Protein 8.0 6.4 - 8.2 g/dL    AST 22 9 - 39 U/L    Bilirubin, Total 0.6 0.0 - 1.2 mg/dL    ALT 23 10 - 52 U/L   Lactate   Result Value Ref Range    Lactate 2.2 (H) 0.4 - 2.0 mmol/L   Lipase   Result Value Ref Range    Lipase 16 9 - 82 U/L   Troponin I, High Sensitivity, Initial   Result Value Ref Range    Troponin I, High Sensitivity 4 0 - 20 ng/L   Lactate   Result Value Ref Range    Lactate 2.0 0.4 - 2.0 mmol/L   Troponin I, High Sensitivity   Result Value Ref Range    Troponin I, High Sensitivity 7 0 - 20 ng/L   CBC   Result Value Ref Range    WBC 8.8 4.4 - 11.3 x10*3/uL    nRBC 0.0 0.0 - 0.0 /100 WBCs    RBC 4.85 4.50 - 5.90 x10*6/uL    Hemoglobin 14.9 13.5 - 17.5 g/dL    Hematocrit 44.8 41.0 - 52.0 %    MCV 92 80 - 100 fL    MCH 30.7 26.0 - 34.0 pg    MCHC 33.3 32.0 - 36.0 g/dL    RDW 12.7 11.5 - 14.5 %    Platelets 267 150 - 450 x10*3/uL    MPV 10.2 7.5 - 11.5 fL   Basic metabolic panel   Result Value Ref Range    Glucose 129 (H) 74 - 99 mg/dL    Sodium 136 136 - 145 mmol/L    Potassium 4.4 3.5 - 5.3 mmol/L    Chloride 100 98 - 107 mmol/L    Bicarbonate 26 21 - 32 mmol/L    Anion Gap 14 10 - 20 mmol/L    Urea Nitrogen 12 6 - 23 mg/dL    Creatinine 0.88 0.50 - 1.30 mg/dL    eGFR >90 >60 mL/min/1.73m*2    Calcium 8.9 8.6 - 10.3 mg/dL       Assessment/Plan   74 y/o M w/PMH of asthma, GERD, BPH presenting w/abdominal pain, n/v; CT imaging concerning for early SBO.     Acute Abdominal Pain  -CT A/P concerning for early SBO  -NGT placed in ED and NPO for bowel rest  -General surgery consulted - discussed with Dr. Sandoval prior to him seeing patient but does not feel this is a SBO  -On IVF, antiemetics  -Lactate normalized  -Normal LFT's, Bilirubin  -Pain regimen adjusted - Morphine dose increased due to acute pain on exam  -Afebrile. No leukocytosis.     Asthma  -No objective/subjective signs of acute  exacerbation at this time  -On Budesonide, Duonebs    GERD  -On PPI    DVT Prophylaxis  -Lovenox     Fluids/Electrolytes/Nutrition  -Laboratory data reviewed.   -Electrolytes stable.   -No nutritional concerns at this time.      Disposition  -Plan of care discussed with attending, Dr. Abel and General Surgery.  -Home with spouse when medically improved.     Nola Reyna, SARAI-CNP

## 2023-10-28 NOTE — H&P
History Of Present Illness  Santy Siddiqi is a 73 y.o. male presenting with abdominal, n/v for 1 day. Patient reports dull primarily epigastric abdominal pain that did not resolve prompting his ED visit. Patient states he had milder, but similar sx several weeks ago that resolved on its own. He reports hernia repair in January of this year; no other abdominal surgeries. Patient denies fever/chills, CP, SOB, urinary complaints. In the ED, patient was afebrile, hypertensive. Labs significant for lactic acid of 2.2, otherwise largely normal. CT a/p showed small bowel loops in RLQ minimally air and fluid distended, concerning for early/developing SBO; GB distended w.small stones. Surgery was consulted by the ED, and NG tube recommended.      Past Medical History  He has a past medical history of Inadequate sleep hygiene, Overactive bladder, Personal history of colonic polyps, Personal history of other diseases of male genital organs, Personal history of other diseases of the digestive system, Personal history of other diseases of the digestive system, Personal history of other diseases of the musculoskeletal system and connective tissue, and Personal history of other specified conditions.    Surgical History  He has a past surgical history that includes Nose surgery (11/07/2017); Colonoscopy (11/07/2017); and Hernia repair.     Social History  He reports that he has quit smoking. His smoking use included cigarettes. He has never used smokeless tobacco. He reports current alcohol use. He reports that he does not use drugs.    Family History  Family History   Problem Relation Name Age of Onset    Pancreatic cancer Mother      Cirrhosis Father          Allergies  Patient has no known allergies.    Review of Systems   Gastrointestinal:  Positive for abdominal pain, nausea and vomiting.   All other systems reviewed and are negative.       Physical Exam  General Appearance: awake and alert, AAOx3, NAD  HEENT: nc/at, eomi,  perrla, moist mucous membranes  Resp: ctab; no wheezing, rhonchi, or rales, normal respiratory effort  Cardio: rrr. S1s2  GI: soft, epigastric tenderness, BS+  Ext: no edema, 2+ pulses b/l       Last Recorded Vitals  /88 (BP Location: Right arm, Patient Position: Lying)   Pulse 53   Temp 35.8 °C (96.4 °F) (Temporal)   Resp 22   Wt 81.6 kg (180 lb)   SpO2 99%     Relevant Results  Results for orders placed or performed during the hospital encounter of 10/27/23 (from the past 24 hour(s))   CBC and Auto Differential   Result Value Ref Range    WBC 5.8 4.4 - 11.3 x10*3/uL    nRBC 0.0 0.0 - 0.0 /100 WBCs    RBC 5.09 4.50 - 5.90 x10*6/uL    Hemoglobin 15.6 13.5 - 17.5 g/dL    Hematocrit 46.7 41.0 - 52.0 %    MCV 92 80 - 100 fL    MCH 30.6 26.0 - 34.0 pg    MCHC 33.4 32.0 - 36.0 g/dL    RDW 12.6 11.5 - 14.5 %    Platelets 278 150 - 450 x10*3/uL    MPV 9.6 7.5 - 11.5 fL    Neutrophils % 53.9 40.0 - 80.0 %    Immature Granulocytes %, Automated 0.2 0.0 - 0.9 %    Lymphocytes % 29.4 13.0 - 44.0 %    Monocytes % 9.5 2.0 - 10.0 %    Eosinophils % 6.7 0.0 - 6.0 %    Basophils % 0.3 0.0 - 2.0 %    Neutrophils Absolute 3.13 1.60 - 5.50 x10*3/uL    Immature Granulocytes Absolute, Automated 0.01 0.00 - 0.50 x10*3/uL    Lymphocytes Absolute 1.71 0.80 - 3.00 x10*3/uL    Monocytes Absolute 0.55 0.05 - 0.80 x10*3/uL    Eosinophils Absolute 0.39 0.00 - 0.40 x10*3/uL    Basophils Absolute 0.02 0.00 - 0.10 x10*3/uL   Comprehensive metabolic panel   Result Value Ref Range    Glucose 135 (H) 74 - 99 mg/dL    Sodium 140 136 - 145 mmol/L    Potassium 3.8 3.5 - 5.3 mmol/L    Chloride 103 98 - 107 mmol/L    Bicarbonate 25 21 - 32 mmol/L    Anion Gap 16 10 - 20 mmol/L    Urea Nitrogen 17 6 - 23 mg/dL    Creatinine 1.15 0.50 - 1.30 mg/dL    eGFR 67 >60 mL/min/1.73m*2    Calcium 10.0 8.6 - 10.3 mg/dL    Albumin 4.5 3.4 - 5.0 g/dL    Alkaline Phosphatase 68 33 - 136 U/L    Total Protein 8.0 6.4 - 8.2 g/dL    AST 22 9 - 39 U/L    Bilirubin,  Total 0.6 0.0 - 1.2 mg/dL    ALT 23 10 - 52 U/L   Lactate   Result Value Ref Range    Lactate 2.2 (H) 0.4 - 2.0 mmol/L   Lipase   Result Value Ref Range    Lipase 16 9 - 82 U/L   Troponin I, High Sensitivity, Initial   Result Value Ref Range    Troponin I, High Sensitivity 4 0 - 20 ng/L   Lactate   Result Value Ref Range    Lactate 2.0 0.4 - 2.0 mmol/L   Troponin I, High Sensitivity   Result Value Ref Range    Troponin I, High Sensitivity 7 0 - 20 ng/L       XR abdomen 1 view    Result Date: 10/27/2023  STUDY: Abdomen Radiographs;  10/27/2023 at 9:46 p.m. INDICATION: NG tube placement. COMPARISON: CT AP 10/27/2023. ACCESSION NUMBER(S): [id number] ORDERING CLINICIAN: TECHNIQUE:  One view(s) of the abdomen. FINDINGS:  Enteric tube terminates in the expected region of the gastric antrum in satisfactory location.  Visualized upper abdomen otherwise grossly unremarkable.      Enteric tube as described. Signed by Joby Oneil MD    CT abdomen pelvis w IV contrast    Result Date: 10/27/2023  STUDY: CT Abdomen and Pelvis with IV Contrast; 10/27/2023 7:50 PM INDICATION: Epigastric pain, nausea, and vomiting. COMPARISON: XR chest 01/21/2023.  ACCESSION NUMBER(S): [id number] ORDERING CLINICIAN: FLORENCIO AGUERO TECHNIQUE: CT of the abdomen and pelvis was performed.  Contiguous axial images were obtained at 3 mm slice thickness through the abdomen and pelvis. Coronal and sagittal reconstructions at 3 mm slice thickness were performed.  Omnipaque 350:75 mL was administered intravenously.  FINDINGS: Abdomen: No significant abnormalities are detectable in the liver, spleen, pancreas, adrenal glands, or kidneys. There are no renal calculi and there is no hydronephrosis. Gallbladder is minimally distended with suggestion of subtle calculi. There is no gallbladder wall thickening or biliary duct dilatation. The stomach is moderately distended with air and fluid. Proximal small bowel loops are non-distended. Small bowel loops in  the right lower quadrant are minimally air and fluid distended, approaching 2 cm diameter. There is no appreciable small bowel thickening. Region of the appendix is unremarkable. Scattered stool is seen throughout the colon. There is no colitis or diverticulitis. Abdominal aorta and IVC are normal caliber and patent. There is no bulky or localizing lymphadenopathy. There is no ascites. Pelvis: Prostate gland is minimally enlarged. Bladder is normally distended. Deep pelvic veins are patent. Small bilateral inguinal hernias contain fat. Skeletal: There is minimal degenerative arthritis in the spine, especially in the lower thoracic spine. Lower chest: No infiltrates are seen. There are no pleural effusions. The heart is normal size. There is a small hiatal hernia.    1. Small bowel loops in the right lower quadrant are minimally air and fluid distended: cannot exclude early and/or developing small bowel obstruction. The stomach is also moderately distended with air and fluid. Close follow-up advised. 2. Gallbladder is distended with suggestion of small stones. No gallbladder wall thickening or biliary duct dilatation. 3. Remainder as above. Signed by Jaylon Tejada MD   Scheduled medications  budesonide, 0.5 mg, nebulization, BID  enoxaparin, 40 mg, subcutaneous, q24h  ipratropium-albuteroL, 3 mL, nebulization, q6h  pantoprazole, 40 mg, oral, Daily before breakfast   Or  pantoprazole, 40 mg, intravenous, Daily before breakfast  tamsulosin, 0.4 mg, oral, Daily      Continuous medications  sodium chloride 0.9%, 75 mL/hr, Last Rate: 75 mL/hr (10/28/23 0022)      PRN medications  PRN medications: acetaminophen **OR** acetaminophen **OR** acetaminophen, morphine, ondansetron **OR** ondansetron               Assessment/Plan   Principal Problem:    Intestinal obstruction, unspecified cause, unspecified whether partial or complete (CMS/HCC)    72 y/o M w/PMH of asthma, GERD, BPH presenting w/abdominal pain, n/v; CT imaging  concerning for early SBO.    SBO  Mild lactic acidosis, resolved  Elevated BP  Asthma  GERD  BPH    NPO, NG tube placed in ED, IVF  Pain management w/IV morphine  Surgery consulted by ED, recs appreciated  Duonebs, pulmicort  Rest of home meds resumed, when able to tolerate po  DVT ppx w/lovenox       Fortunato Baker MD

## 2023-10-28 NOTE — CONSULTS
General Surgery History and Physical    Referring Provider:  Gage Hobbs MD  No ref. provider found     Chief Complaint:  Chief Complaint   Patient presents with    Abdominal Pain    Nausea       History of Present Illness:  This is a 73 y.o. male who presents with abdominal pain and dry heaves.  He reports that may be a couple weeks ago he had less severe but similar symptoms, but these went away on their own.  He then had sudden onset of this pain after eating bologna while gardening yesterday.  He otherwise has never had any similar symptoms.  He denies any fevers or chills.  He denies any vomiting but reports significant dry heaves and nausea.  He reports the pain in the epigastrium.    Past Medical History:  Hiatal hernia  Colon polyps  Asthma  Benign prostatic hypertrophy    Past Surgical History:  Laparoscopic hiatal hernia repair with Toupet fundoplication  Colonoscopy  Esophagogastroduodenoscopy  Sinus surgery    Medications:  Current Outpatient Medications   Medication Instructions    albuterol 90 mcg/actuation inhaler 2 puffs, inhalation, Every 6 hours PRN    clobetasol (Temovate) 0.05 % cream Apply sparingly to affected area(s) twice daily for up to one week at a time    ipratropium (Atrovent) 21 mcg (0.03 %) nasal spray use 2 sprays in each nostril in the morning and 2 sprays in the evening and 2 sprays before bedtime    omeprazole (PRILOSEC) 40 mg, oral, Daily    Symbicort 160-4.5 mcg/actuation inhaler 2 puffs, inhalation, 2 times daily, RINSE MOUTH AFTER USE    tamsulosin (FLOMAX) 0.4 mg, oral, Daily        Allergies:  No Known Allergies     Family History:  Pancreatic cancer  Cirrhosis    Social History:  .  Retired.  Denies tobacco, alcohol, and illicits.  Quit smoking in 1980.       Review of Systems:  A complete 12 point review of systems was performed and is negative except as noted in the history of present illness.      Vital Signs:  Vitals:    10/28/23 0810   BP:    Pulse:    Resp:     Temp:    SpO2: 98%          Physical Exam:  General: No acute distress. Sitting up in bed.   Neuro: Alert and oriented ×3. Follows commands.  Head: Atraumatic.  NG tube with minimal gastric output.  Eyes: Pupils equal reactive to light. Extraocular motions intact.  Ears: Hears normal speaking voice.  Mouth, Nose, Throat: Mucous membranes moist.  Normal dentition.  Neck: Supple. No appreciable masses.  Chest: No crepitus.  No appreciable scars.  Heart: Regular rate and rhythm.  Lung: Clear to auscultation bilaterally.  Vascular: No carotid bruits.  Palpable radial pulses bilaterally.  Abdomen: Soft. Nondistended.  Right upper quadrant tenderness.  Well-healed laparoscopic scars.  Musculoskeletal: Moves all extremities.  Normal range of motion.  Lymphatic: No palpable lymph nodes.  Skin: No rashes or lesions.  Psychological: Normal affect      Laboratory Values:  CBC:   Lab Results   Component Value Date    WBC 8.8 10/28/2023    RBC 4.85 10/28/2023    HGB 14.9 10/28/2023    HCT 44.8 10/28/2023     10/28/2023       RFP:   Lab Results   Component Value Date     10/28/2023    K 4.4 10/28/2023     10/28/2023    CO2 26 10/28/2023    BUN 12 10/28/2023    CREATININE 0.88 10/28/2023    CALCIUM 8.9 10/28/2023        LFTs:   Lab Results   Component Value Date    PROT 8.0 10/27/2023    ALBUMIN 4.5 10/27/2023    BILITOT 0.6 10/27/2023    ALKPHOS 68 10/27/2023    AST 22 10/27/2023    ALT 23 10/27/2023            Imaging:  I have personally reviewed the images and the radiologist's report.  CT abdomen pelvis: Distended gallbladder.  Posterior fundoplication.  Very minimally dilated distal small bowel.      Assessment:  This is a 73 y.o. male who presents with 1 day of abdominal pain and dry heaves.  An NG tube was placed due to concern for a small bowel obstruction.  However, his abdominal pain is mostly on the right side, and appears to be mostly right upper quadrant on physical exam.  He is unable to give  any specific symptoms and pinpoint his abdominal pain.  We discussed that I have low suspicion for a bowel obstruction, but we would proceed with a water-soluble oral contrast study to see if contrast passes through to the small bowel.  We would also perform an ultrasound of the gallbladder, as I have concern for the gallbladder as a source of his pain.  We also discussed aggressive treatment of gastritis as a possible source of the pain.      Plan:  -- IV pantoprazole twice daily at this time  -- Carafate 4 times daily through the NG tube for 2 weeks  -- Scheduled liquid Tylenol through the NG tube every 6 hours  -- Scheduled Toradol every 6 hours  -- As needed Dilaudid  -- Ambulate  -- Gastrografin contrast study through the NG tube with an acute abdominal series of x-rays 1 hour after administration of contrast  -- Right upper quadrant ultrasound  -- Possible laparoscopic cholecystectomy tomorrow depending on outcome of the studies      Gerald Sandoval MD  General Surgery  Office: [phone]  Fax:     [phone]  2:45 PM   10/28/23

## 2023-10-28 NOTE — ED NOTES
Pt placed on O2Nc at 4L after pain medication administration d/t low O2 saturation readings. Pt alert and responsive during this time.      Fran Farr RN  10/27/23 2025

## 2023-10-28 NOTE — CARE PLAN
The patient's goals for the shift include  pain management    Problem: Fall/Injury  Goal: Not fall by end of shift  Outcome: Progressing     Problem: Pain  Goal: Turns in bed with improved pain control throughout the shift  Outcome: Progressing  Goal: Free from opioid side effects throughout the shift  Outcome: Progressing     Problem: Psychosocial Needs  Goal: Demonstrates ability to cope with hospitalization/illness  Outcome: Progressing       The clinical goals for the shift include pain management

## 2023-10-29 ENCOUNTER — ANESTHESIA EVENT (OUTPATIENT)
Dept: OPERATING ROOM | Facility: HOSPITAL | Age: 73
DRG: 418 | End: 2023-10-29
Payer: MEDICARE

## 2023-10-29 ENCOUNTER — APPOINTMENT (OUTPATIENT)
Dept: OPERATING ROOM | Facility: HOSPITAL | Age: 73
DRG: 418 | End: 2023-10-29
Payer: MEDICARE

## 2023-10-29 ENCOUNTER — ANESTHESIA (OUTPATIENT)
Dept: OPERATING ROOM | Facility: HOSPITAL | Age: 73
DRG: 418 | End: 2023-10-29
Payer: MEDICARE

## 2023-10-29 ENCOUNTER — APPOINTMENT (OUTPATIENT)
Dept: RADIOLOGY | Facility: HOSPITAL | Age: 73
DRG: 418 | End: 2023-10-29
Payer: MEDICARE

## 2023-10-29 ENCOUNTER — HOSPITAL ENCOUNTER (OUTPATIENT)
Dept: CARDIOLOGY | Facility: HOSPITAL | Age: 73
Discharge: HOME | End: 2023-10-29
Payer: MEDICARE

## 2023-10-29 LAB
CARDIAC TROPONIN I PNL SERPL HS: 362 NG/L (ref 0–20)
ERYTHROCYTE [DISTWIDTH] IN BLOOD BY AUTOMATED COUNT: 12.8 % (ref 11.5–14.5)
HCT VFR BLD AUTO: 45.5 % (ref 41–52)
HGB BLD-MCNC: 15.1 G/DL (ref 13.5–17.5)
MCH RBC QN AUTO: 30.3 PG (ref 26–34)
MCHC RBC AUTO-ENTMCNC: 33.2 G/DL (ref 32–36)
MCV RBC AUTO: 91 FL (ref 80–100)
NRBC BLD-RTO: 0 /100 WBCS (ref 0–0)
PLATELET # BLD AUTO: 255 X10*3/UL (ref 150–450)
PMV BLD AUTO: 10.2 FL (ref 7.5–11.5)
RBC # BLD AUTO: 4.98 X10*6/UL (ref 4.5–5.9)
UFH PPP CHRO-ACNC: 0.5 IU/ML
UFH PPP CHRO-ACNC: 1.2 IU/ML
WBC # BLD AUTO: 12.7 X10*3/UL (ref 4.4–11.3)

## 2023-10-29 PROCEDURE — 2500000005 HC RX 250 GENERAL PHARMACY W/O HCPCS: Performed by: SURGERY

## 2023-10-29 PROCEDURE — 2500000002 HC RX 250 W HCPCS SELF ADMINISTERED DRUGS (ALT 637 FOR MEDICARE OP, ALT 636 FOR OP/ED): Performed by: SURGERY

## 2023-10-29 PROCEDURE — 85520 HEPARIN ASSAY: CPT | Performed by: STUDENT IN AN ORGANIZED HEALTH CARE EDUCATION/TRAINING PROGRAM

## 2023-10-29 PROCEDURE — 0FT44ZZ RESECTION OF GALLBLADDER, PERCUTANEOUS ENDOSCOPIC APPROACH: ICD-10-PCS | Performed by: SURGERY

## 2023-10-29 PROCEDURE — 3700000002 HC GENERAL ANESTHESIA TIME - EACH INCREMENTAL 1 MINUTE: Performed by: SURGERY

## 2023-10-29 PROCEDURE — 99233 SBSQ HOSP IP/OBS HIGH 50: CPT | Performed by: INTERNAL MEDICINE

## 2023-10-29 PROCEDURE — 2500000002 HC RX 250 W HCPCS SELF ADMINISTERED DRUGS (ALT 637 FOR MEDICARE OP, ALT 636 FOR OP/ED): Performed by: ANESTHESIOLOGY

## 2023-10-29 PROCEDURE — 2500000004 HC RX 250 GENERAL PHARMACY W/ HCPCS (ALT 636 FOR OP/ED): Performed by: NURSE ANESTHETIST, CERTIFIED REGISTERED

## 2023-10-29 PROCEDURE — 74018 RADEX ABDOMEN 1 VIEW: CPT | Mod: FY

## 2023-10-29 PROCEDURE — 3700000001 HC GENERAL ANESTHESIA TIME - INITIAL BASE CHARGE: Performed by: SURGERY

## 2023-10-29 PROCEDURE — 93005 ELECTROCARDIOGRAM TRACING: CPT

## 2023-10-29 PROCEDURE — 3600000009 HC OR TIME - EACH INCREMENTAL 1 MINUTE - PROCEDURE LEVEL FOUR: Performed by: SURGERY

## 2023-10-29 PROCEDURE — 2500000004 HC RX 250 GENERAL PHARMACY W/ HCPCS (ALT 636 FOR OP/ED): Performed by: STUDENT IN AN ORGANIZED HEALTH CARE EDUCATION/TRAINING PROGRAM

## 2023-10-29 PROCEDURE — 2500000001 HC RX 250 WO HCPCS SELF ADMINISTERED DRUGS (ALT 637 FOR MEDICARE OP): Performed by: SURGERY

## 2023-10-29 PROCEDURE — 99222 1ST HOSP IP/OBS MODERATE 55: CPT | Performed by: NURSE PRACTITIONER

## 2023-10-29 PROCEDURE — 2720000007 HC OR 272 NO HCPCS: Performed by: SURGERY

## 2023-10-29 PROCEDURE — 3600000004 HC OR TIME - INITIAL BASE CHARGE - PROCEDURE LEVEL FOUR: Performed by: SURGERY

## 2023-10-29 PROCEDURE — 2500000004 HC RX 250 GENERAL PHARMACY W/ HCPCS (ALT 636 FOR OP/ED): Performed by: SURGERY

## 2023-10-29 PROCEDURE — C9113 INJ PANTOPRAZOLE SODIUM, VIA: HCPCS | Performed by: SURGERY

## 2023-10-29 PROCEDURE — 7100000001 HC RECOVERY ROOM TIME - INITIAL BASE CHARGE: Performed by: SURGERY

## 2023-10-29 PROCEDURE — 88304 TISSUE EXAM BY PATHOLOGIST: CPT | Mod: TC,GEALAB | Performed by: SURGERY

## 2023-10-29 PROCEDURE — 93010 ELECTROCARDIOGRAM REPORT: CPT | Performed by: INTERNAL MEDICINE

## 2023-10-29 PROCEDURE — 88304 TISSUE EXAM BY PATHOLOGIST: CPT | Performed by: PATHOLOGY

## 2023-10-29 PROCEDURE — 96372 THER/PROPH/DIAG INJ SC/IM: CPT | Performed by: SURGERY

## 2023-10-29 PROCEDURE — 36415 COLL VENOUS BLD VENIPUNCTURE: CPT | Performed by: STUDENT IN AN ORGANIZED HEALTH CARE EDUCATION/TRAINING PROGRAM

## 2023-10-29 PROCEDURE — B24BZZ4 ULTRASONOGRAPHY OF HEART WITH AORTA, TRANSESOPHAGEAL: ICD-10-PCS | Performed by: SURGERY

## 2023-10-29 PROCEDURE — P9045 ALBUMIN (HUMAN), 5%, 250 ML: HCPCS | Mod: JZ | Performed by: NURSE ANESTHETIST, CERTIFIED REGISTERED

## 2023-10-29 PROCEDURE — 94640 AIRWAY INHALATION TREATMENT: CPT

## 2023-10-29 PROCEDURE — 74018 RADEX ABDOMEN 1 VIEW: CPT | Performed by: RADIOLOGY

## 2023-10-29 PROCEDURE — 88304 TISSUE EXAM BY PATHOLOGIST: CPT | Mod: TC,SUR,GEALAB | Performed by: SURGERY

## 2023-10-29 PROCEDURE — 2500000001 HC RX 250 WO HCPCS SELF ADMINISTERED DRUGS (ALT 637 FOR MEDICARE OP): Performed by: STUDENT IN AN ORGANIZED HEALTH CARE EDUCATION/TRAINING PROGRAM

## 2023-10-29 PROCEDURE — 5A2204Z RESTORATION OF CARDIAC RHYTHM, SINGLE: ICD-10-PCS | Performed by: INTERNAL MEDICINE

## 2023-10-29 PROCEDURE — C9113 INJ PANTOPRAZOLE SODIUM, VIA: HCPCS | Performed by: STUDENT IN AN ORGANIZED HEALTH CARE EDUCATION/TRAINING PROGRAM

## 2023-10-29 PROCEDURE — 2060000001 HC INTERMEDIATE ICU ROOM DAILY

## 2023-10-29 PROCEDURE — 2500000002 HC RX 250 W HCPCS SELF ADMINISTERED DRUGS (ALT 637 FOR MEDICARE OP, ALT 636 FOR OP/ED): Performed by: STUDENT IN AN ORGANIZED HEALTH CARE EDUCATION/TRAINING PROGRAM

## 2023-10-29 PROCEDURE — 7100000002 HC RECOVERY ROOM TIME - EACH INCREMENTAL 1 MINUTE: Performed by: SURGERY

## 2023-10-29 PROCEDURE — 99233 SBSQ HOSP IP/OBS HIGH 50: CPT | Performed by: SURGERY

## 2023-10-29 PROCEDURE — 2500000004 HC RX 250 GENERAL PHARMACY W/ HCPCS (ALT 636 FOR OP/ED): Performed by: ANESTHESIOLOGY

## 2023-10-29 PROCEDURE — BF53200 OTHER IMAGING OF GALLBLADDER AND BILE DUCTS USING FLUORESCING AGENT, INDOCYANINE GREEN DYE, INTRAOPERATIVE: ICD-10-PCS | Performed by: SURGERY

## 2023-10-29 PROCEDURE — 84484 ASSAY OF TROPONIN QUANT: CPT | Performed by: STUDENT IN AN ORGANIZED HEALTH CARE EDUCATION/TRAINING PROGRAM

## 2023-10-29 PROCEDURE — 2500000005 HC RX 250 GENERAL PHARMACY W/O HCPCS: Performed by: NURSE ANESTHETIST, CERTIFIED REGISTERED

## 2023-10-29 PROCEDURE — 85027 COMPLETE CBC AUTOMATED: CPT | Performed by: STUDENT IN AN ORGANIZED HEALTH CARE EDUCATION/TRAINING PROGRAM

## 2023-10-29 PROCEDURE — 2500000005 HC RX 250 GENERAL PHARMACY W/O HCPCS: Performed by: ANESTHESIOLOGY

## 2023-10-29 RX ORDER — ENOXAPARIN SODIUM 100 MG/ML
40 INJECTION SUBCUTANEOUS DAILY
Status: DISCONTINUED | OUTPATIENT
Start: 2023-10-29 | End: 2023-10-31

## 2023-10-29 RX ORDER — INDOCYANINE GREEN AND WATER 25 MG
KIT INJECTION AS NEEDED
Status: DISCONTINUED | OUTPATIENT
Start: 2023-10-29 | End: 2023-10-29

## 2023-10-29 RX ORDER — BUPIVACAINE HYDROCHLORIDE 5 MG/ML
INJECTION, SOLUTION EPIDURAL; INTRACAUDAL AS NEEDED
Status: DISCONTINUED | OUTPATIENT
Start: 2023-10-29 | End: 2023-10-29 | Stop reason: HOSPADM

## 2023-10-29 RX ORDER — TRAMADOL HYDROCHLORIDE 50 MG/1
50 TABLET ORAL EVERY 4 HOURS PRN
Status: DISCONTINUED | OUTPATIENT
Start: 2023-10-29 | End: 2023-11-01 | Stop reason: HOSPADM

## 2023-10-29 RX ORDER — INDOCYANINE GREEN AND WATER 25 MG
2.5 KIT INJECTION ONCE
Status: COMPLETED | OUTPATIENT
Start: 2023-10-29 | End: 2023-10-29

## 2023-10-29 RX ORDER — ACETAMINOPHEN 325 MG/1
650 TABLET ORAL EVERY 6 HOURS
Status: DISCONTINUED | OUTPATIENT
Start: 2023-10-29 | End: 2023-11-01 | Stop reason: HOSPADM

## 2023-10-29 RX ORDER — NORETHINDRONE AND ETHINYL ESTRADIOL 0.5-0.035
KIT ORAL CONTINUOUS PRN
Status: DISCONTINUED | OUTPATIENT
Start: 2023-10-29 | End: 2023-10-29

## 2023-10-29 RX ORDER — ALBUMIN HUMAN 50 G/1000ML
SOLUTION INTRAVENOUS AS NEEDED
Status: DISCONTINUED | OUTPATIENT
Start: 2023-10-29 | End: 2023-10-29

## 2023-10-29 RX ORDER — SODIUM CHLORIDE, SODIUM LACTATE, POTASSIUM CHLORIDE, CALCIUM CHLORIDE 600; 310; 30; 20 MG/100ML; MG/100ML; MG/100ML; MG/100ML
100 INJECTION, SOLUTION INTRAVENOUS CONTINUOUS
Status: DISCONTINUED | OUTPATIENT
Start: 2023-10-29 | End: 2023-10-29

## 2023-10-29 RX ORDER — PROPOFOL 10 MG/ML
INJECTION, EMULSION INTRAVENOUS AS NEEDED
Status: DISCONTINUED | OUTPATIENT
Start: 2023-10-29 | End: 2023-10-29

## 2023-10-29 RX ORDER — LIDOCAINE HYDROCHLORIDE 20 MG/ML
INJECTION, SOLUTION INFILTRATION; PERINEURAL AS NEEDED
Status: DISCONTINUED | OUTPATIENT
Start: 2023-10-29 | End: 2023-10-29

## 2023-10-29 RX ORDER — ALBUTEROL SULFATE 0.83 MG/ML
2.5 SOLUTION RESPIRATORY (INHALATION) ONCE AS NEEDED
Status: DISCONTINUED | OUTPATIENT
Start: 2023-10-29 | End: 2023-10-30 | Stop reason: HOSPADM

## 2023-10-29 RX ORDER — IBUPROFEN 200 MG
600 TABLET ORAL EVERY 6 HOURS SCHEDULED
Status: DISCONTINUED | OUTPATIENT
Start: 2023-10-29 | End: 2023-11-01 | Stop reason: HOSPADM

## 2023-10-29 RX ORDER — METOPROLOL TARTRATE 1 MG/ML
3 INJECTION, SOLUTION INTRAVENOUS ONCE
Status: COMPLETED | OUTPATIENT
Start: 2023-10-29 | End: 2023-10-29

## 2023-10-29 RX ORDER — HYDROMORPHONE HYDROCHLORIDE 2 MG/ML
INJECTION, SOLUTION INTRAMUSCULAR; INTRAVENOUS; SUBCUTANEOUS AS NEEDED
Status: DISCONTINUED | OUTPATIENT
Start: 2023-10-29 | End: 2023-10-29

## 2023-10-29 RX ORDER — MIDAZOLAM HYDROCHLORIDE 1 MG/ML
INJECTION, SOLUTION INTRAMUSCULAR; INTRAVENOUS AS NEEDED
Status: DISCONTINUED | OUTPATIENT
Start: 2023-10-29 | End: 2023-10-29

## 2023-10-29 RX ORDER — OXYCODONE HYDROCHLORIDE 5 MG/1
5 TABLET ORAL EVERY 4 HOURS PRN
Status: DISCONTINUED | OUTPATIENT
Start: 2023-10-29 | End: 2023-11-01 | Stop reason: HOSPADM

## 2023-10-29 RX ORDER — PHENYLEPHRINE HCL IN 0.9% NACL 0.4MG/10ML
SYRINGE (ML) INTRAVENOUS AS NEEDED
Status: DISCONTINUED | OUTPATIENT
Start: 2023-10-29 | End: 2023-10-29

## 2023-10-29 RX ORDER — METOPROLOL TARTRATE 1 MG/ML
5 INJECTION, SOLUTION INTRAVENOUS ONCE
Status: COMPLETED | OUTPATIENT
Start: 2023-10-29 | End: 2023-10-29

## 2023-10-29 RX ORDER — ONDANSETRON HYDROCHLORIDE 2 MG/ML
8 INJECTION, SOLUTION INTRAVENOUS ONCE
Status: DISCONTINUED | OUTPATIENT
Start: 2023-10-29 | End: 2023-10-30 | Stop reason: HOSPADM

## 2023-10-29 RX ORDER — IPRATROPIUM BROMIDE AND ALBUTEROL SULFATE 2.5; .5 MG/3ML; MG/3ML
3 SOLUTION RESPIRATORY (INHALATION) ONCE
Status: COMPLETED | OUTPATIENT
Start: 2023-10-29 | End: 2023-10-29

## 2023-10-29 RX ORDER — ROCURONIUM BROMIDE 10 MG/ML
INJECTION, SOLUTION INTRAVENOUS AS NEEDED
Status: DISCONTINUED | OUTPATIENT
Start: 2023-10-29 | End: 2023-10-29

## 2023-10-29 RX ORDER — ACETAMINOPHEN 325 MG/1
650 TABLET ORAL EVERY 4 HOURS PRN
Status: DISCONTINUED | OUTPATIENT
Start: 2023-10-29 | End: 2023-10-29

## 2023-10-29 RX ORDER — FENTANYL CITRATE 50 UG/ML
INJECTION, SOLUTION INTRAMUSCULAR; INTRAVENOUS AS NEEDED
Status: DISCONTINUED | OUTPATIENT
Start: 2023-10-29 | End: 2023-10-29

## 2023-10-29 RX ORDER — SUCCINYLCHOLINE CHLORIDE 20 MG/ML
INJECTION INTRAMUSCULAR; INTRAVENOUS AS NEEDED
Status: DISCONTINUED | OUTPATIENT
Start: 2023-10-29 | End: 2023-10-29

## 2023-10-29 RX ORDER — POLYETHYLENE GLYCOL 3350 17 G/17G
17 POWDER, FOR SOLUTION ORAL DAILY
Status: DISCONTINUED | OUTPATIENT
Start: 2023-10-30 | End: 2023-11-01 | Stop reason: HOSPADM

## 2023-10-29 RX ADMIN — ROCURONIUM BROMIDE 20 MG: 10 INJECTION, SOLUTION INTRAVENOUS at 14:15

## 2023-10-29 RX ADMIN — ROCURONIUM BROMIDE 20 MG: 10 INJECTION, SOLUTION INTRAVENOUS at 13:30

## 2023-10-29 RX ADMIN — SUCCINYLCHOLINE CHLORIDE 140 MG: 20 INJECTION, SOLUTION INTRAMUSCULAR; INTRAVENOUS at 12:43

## 2023-10-29 RX ADMIN — SODIUM CHLORIDE, POTASSIUM CHLORIDE, SODIUM LACTATE AND CALCIUM CHLORIDE 100 ML/HR: 600; 310; 30; 20 INJECTION, SOLUTION INTRAVENOUS at 15:30

## 2023-10-29 RX ADMIN — DILTIAZEM HCL 125 MG/125ML IN DEXTROSE 5% IV SOLN (1 MG/ML) 5 MG/HR: 125-5/125 SOLUTION at 18:48

## 2023-10-29 RX ADMIN — PANTOPRAZOLE SODIUM 40 MG: 40 INJECTION, POWDER, FOR SOLUTION INTRAVENOUS at 20:20

## 2023-10-29 RX ADMIN — METOCLOPRAMIDE 5 MG: 5 INJECTION, SOLUTION INTRAMUSCULAR; INTRAVENOUS at 23:48

## 2023-10-29 RX ADMIN — METOPROLOL TARTRATE 5 MG: 1 INJECTION, SOLUTION INTRAVENOUS at 15:28

## 2023-10-29 RX ADMIN — POLYETHYLENE GLYCOL 3350 17 G: 17 POWDER, FOR SOLUTION ORAL at 08:47

## 2023-10-29 RX ADMIN — Medication 1 UNITS: at 13:01

## 2023-10-29 RX ADMIN — Medication 2 UNITS: at 13:05

## 2023-10-29 RX ADMIN — Medication 200 MCG: at 12:46

## 2023-10-29 RX ADMIN — FENTANYL CITRATE 50 MCG: 50 INJECTION, SOLUTION INTRAMUSCULAR; INTRAVENOUS at 12:43

## 2023-10-29 RX ADMIN — METOCLOPRAMIDE 5 MG: 5 INJECTION, SOLUTION INTRAMUSCULAR; INTRAVENOUS at 17:40

## 2023-10-29 RX ADMIN — HYDROMORPHONE HYDROCHLORIDE 1 MG: 2 INJECTION, SOLUTION INTRAMUSCULAR; INTRAVENOUS; SUBCUTANEOUS at 15:00

## 2023-10-29 RX ADMIN — ONDANSETRON 4 MG: 2 INJECTION INTRAMUSCULAR; INTRAVENOUS at 14:46

## 2023-10-29 RX ADMIN — METOPROLOL TARTRATE 3 MG: 1 INJECTION, SOLUTION INTRAVENOUS at 11:45

## 2023-10-29 RX ADMIN — PIPERACILLIN SODIUM AND TAZOBACTAM SODIUM 3.38 G: 3; .375 INJECTION, SOLUTION INTRAVENOUS at 23:47

## 2023-10-29 RX ADMIN — SODIUM CHLORIDE, SODIUM LACTATE, POTASSIUM CHLORIDE, AND CALCIUM CHLORIDE: 600; 310; 30; 20 INJECTION, SOLUTION INTRAVENOUS at 12:36

## 2023-10-29 RX ADMIN — ROCURONIUM BROMIDE 30 MG: 10 INJECTION, SOLUTION INTRAVENOUS at 12:59

## 2023-10-29 RX ADMIN — SUCRALFATE ORAL 1 G: 1 SUSPENSION ORAL at 06:22

## 2023-10-29 RX ADMIN — MIDAZOLAM 2 MG: 1 INJECTION INTRAMUSCULAR; INTRAVENOUS at 12:42

## 2023-10-29 RX ADMIN — INDOCYANINE GREEN AND WATER 2.5 MG: KIT at 12:30

## 2023-10-29 RX ADMIN — PIPERACILLIN SODIUM AND TAZOBACTAM SODIUM 3.38 G: 3; .375 INJECTION, SOLUTION INTRAVENOUS at 10:27

## 2023-10-29 RX ADMIN — IBUPROFEN 600 MG: 200 TABLET, FILM COATED ORAL at 17:40

## 2023-10-29 RX ADMIN — BUDESONIDE 0.5 MG: 0.5 INHALANT RESPIRATORY (INHALATION) at 09:04

## 2023-10-29 RX ADMIN — ALBUMIN (HUMAN) 250 ML: 12.5 INJECTION, SOLUTION INTRAVENOUS at 13:25

## 2023-10-29 RX ADMIN — DILTIAZEM HCL 125 MG/125ML IN DEXTROSE 5% IV SOLN (1 MG/ML) 15 MG/HR: 125-5/125 SOLUTION at 07:44

## 2023-10-29 RX ADMIN — FENTANYL CITRATE 25 MCG: 50 INJECTION, SOLUTION INTRAMUSCULAR; INTRAVENOUS at 13:52

## 2023-10-29 RX ADMIN — POTASSIUM CHLORIDE, DEXTROSE MONOHYDRATE AND SODIUM CHLORIDE 100 ML/HR: 150; 5; 450 INJECTION, SOLUTION INTRAVENOUS at 23:15

## 2023-10-29 RX ADMIN — ACETAMINOPHEN 650 MG: 160 SOLUTION ORAL at 02:13

## 2023-10-29 RX ADMIN — BUDESONIDE 0.5 MG: 0.5 INHALANT RESPIRATORY (INHALATION) at 19:45

## 2023-10-29 RX ADMIN — KETOROLAC TROMETHAMINE 15 MG: 15 INJECTION, SOLUTION INTRAMUSCULAR; INTRAVENOUS at 08:47

## 2023-10-29 RX ADMIN — DEXAMETHASONE SODIUM PHOSPHATE 8 MG: 4 INJECTION, SOLUTION INTRAMUSCULAR; INTRAVENOUS at 14:46

## 2023-10-29 RX ADMIN — IPRATROPIUM BROMIDE AND ALBUTEROL SULFATE 3 ML: 2.5; .5 SOLUTION RESPIRATORY (INHALATION) at 19:46

## 2023-10-29 RX ADMIN — TAMSULOSIN HYDROCHLORIDE 0.4 MG: 0.4 CAPSULE ORAL at 08:47

## 2023-10-29 RX ADMIN — ALBUMIN (HUMAN) 250 ML: 12.5 INJECTION, SOLUTION INTRAVENOUS at 13:17

## 2023-10-29 RX ADMIN — POTASSIUM CHLORIDE, DEXTROSE MONOHYDRATE AND SODIUM CHLORIDE 100 ML/HR: 150; 5; 450 INJECTION, SOLUTION INTRAVENOUS at 06:42

## 2023-10-29 RX ADMIN — SUCRALFATE ORAL 1 G: 1 SUSPENSION ORAL at 23:47

## 2023-10-29 RX ADMIN — METOCLOPRAMIDE 5 MG: 5 INJECTION, SOLUTION INTRAMUSCULAR; INTRAVENOUS at 06:22

## 2023-10-29 RX ADMIN — ACETAMINOPHEN 650 MG: 160 SOLUTION ORAL at 08:47

## 2023-10-29 RX ADMIN — FENTANYL CITRATE 25 MCG: 50 INJECTION, SOLUTION INTRAMUSCULAR; INTRAVENOUS at 14:58

## 2023-10-29 RX ADMIN — Medication 200 MCG: at 12:58

## 2023-10-29 RX ADMIN — Medication: at 15:30

## 2023-10-29 RX ADMIN — LIDOCAINE HYDROCHLORIDE 60 MG: 20 INJECTION, SOLUTION INFILTRATION; PERINEURAL at 12:43

## 2023-10-29 RX ADMIN — Medication 200 MCG: at 12:50

## 2023-10-29 RX ADMIN — PROPOFOL 100 MG: 10 INJECTION, EMULSION INTRAVENOUS at 12:43

## 2023-10-29 RX ADMIN — IPRATROPIUM BROMIDE AND ALBUTEROL SULFATE 3 ML: 2.5; .5 SOLUTION RESPIRATORY (INHALATION) at 11:33

## 2023-10-29 RX ADMIN — IPRATROPIUM BROMIDE AND ALBUTEROL SULFATE 3 ML: 2.5; .5 SOLUTION RESPIRATORY (INHALATION) at 09:07

## 2023-10-29 RX ADMIN — INDOCYANINE GREEN AND WATER 5 MG: KIT at 13:56

## 2023-10-29 RX ADMIN — Medication 1 UNITS: at 12:59

## 2023-10-29 RX ADMIN — PANTOPRAZOLE SODIUM 40 MG: 40 INJECTION, POWDER, FOR SOLUTION INTRAVENOUS at 08:47

## 2023-10-29 RX ADMIN — SUCRALFATE ORAL 1 G: 1 SUSPENSION ORAL at 17:39

## 2023-10-29 RX ADMIN — ACETAMINOPHEN 650 MG: 325 TABLET ORAL at 17:41

## 2023-10-29 RX ADMIN — SUGAMMADEX 200 MG: 100 INJECTION, SOLUTION INTRAVENOUS at 14:49

## 2023-10-29 RX ADMIN — Medication 200 MCG: at 12:52

## 2023-10-29 RX ADMIN — ENOXAPARIN SODIUM 40 MG: 40 INJECTION SUBCUTANEOUS at 17:40

## 2023-10-29 RX ADMIN — PIPERACILLIN SODIUM AND TAZOBACTAM SODIUM 3.38 G: 3; .375 INJECTION, SOLUTION INTRAVENOUS at 17:39

## 2023-10-29 RX ADMIN — SODIUM CHLORIDE, SODIUM LACTATE, POTASSIUM CHLORIDE, AND CALCIUM CHLORIDE: 600; 310; 30; 20 INJECTION, SOLUTION INTRAVENOUS at 13:17

## 2023-10-29 RX ADMIN — KETOROLAC TROMETHAMINE 15 MG: 15 INJECTION, SOLUTION INTRAMUSCULAR; INTRAVENOUS at 02:13

## 2023-10-29 ASSESSMENT — COGNITIVE AND FUNCTIONAL STATUS - GENERAL
TURNING FROM BACK TO SIDE WHILE IN FLAT BAD: A LITTLE
MOVING FROM LYING ON BACK TO SITTING ON SIDE OF FLAT BED WITH BEDRAILS: A LITTLE
MOBILITY SCORE: 18
DAILY ACTIVITIY SCORE: 24
CLIMB 3 TO 5 STEPS WITH RAILING: A LITTLE
DAILY ACTIVITIY SCORE: 20
MOVING TO AND FROM BED TO CHAIR: A LITTLE
TOILETING: A LITTLE
HELP NEEDED FOR BATHING: A LITTLE
STANDING UP FROM CHAIR USING ARMS: A LITTLE
WALKING IN HOSPITAL ROOM: A LITTLE
MOBILITY SCORE: 24
DRESSING REGULAR LOWER BODY CLOTHING: A LITTLE
DRESSING REGULAR UPPER BODY CLOTHING: A LITTLE

## 2023-10-29 ASSESSMENT — PAIN SCALES - GENERAL
PAINLEVEL_OUTOF10: 0 - NO PAIN
PAINLEVEL_OUTOF10: 1
PAINLEVEL_OUTOF10: 0 - NO PAIN
PAIN_LEVEL: 3
PAINLEVEL_OUTOF10: 0 - NO PAIN

## 2023-10-29 ASSESSMENT — PAIN - FUNCTIONAL ASSESSMENT: PAIN_FUNCTIONAL_ASSESSMENT: 0-10

## 2023-10-29 NOTE — OP NOTE
Cholecystectomy Laparoscopy     Operative Date: 10/29/23  Patient's Name: Santy Siddiqi  Patient's YOB: 1950  Patient's MRN: [id number]  Patient's Age: 73 y.o.  Operating Room Location: [facility]  Patient's ASA: III  Patient's Estimated Blood Loss: 30 mL        PREOPERATIVE DIAGNOSIS:   Gangrenous cholecystitis        POSTOPERATIVE DIAGNOSIS:   Gangrenous cholecystitis        OPERATION/PROCEDURE:   Laparoscopic cholecystectomy with firefly cholangiography, with difficulty, 2 hours        SURGEON:   Wade Sandoval MD.         ASSISTANT:   Scrub assist.           INDICATIONS:   This is a 73 y.o. male who presented with gangrenous cholecystitis.  He presented to the emergency department with abdominal pain.  He was admitted to medicine with concerns for a possible early partial small bowel obstruction, despite only having a laparoscopic hiatal hernia repair with fundoplication in the past.  On my exam, I did not feel that he had a bowel function, but I was concerned for cholecystitis.  We therefore proceeded with work-up of the gallbladder, which was consistent with cholecystitis.  We therefore planned on a laparoscopic cholecystectomy and an EGD.  Please see the [facility] report for the EGD.        OPERATION:   The reasons for, benefits to, and risks of surgery were discussed with the patient.  The risks included, but not limited to, bleeding, infection, persistent pain, injury to surrounding structures, and postoperative abscess.  The patient appeared to understand and consented for surgery.  He was therefore brought back to the operating room and placed on the operating room table in the supine position.  His abdomen was prepped and draped in a standard fashion.       We accessed the abdomen in the left upper quadrant with a Veress needle.  We then insufflated the pressure.  After insufflating to pressure, we made a 5 mm incision periumbilically.  We then entered  the abdomen with a 5 mm optical view port.  We then removed the Veress needle.  We then performed our bilateral transversus abdominis plane block, instilling 15 mL of half percent Marcaine into each transversus abdominis plane under direct visualization.  We then placed our three 5 mm working ports in the standard right subcostal location.  We then placed the patient in reverse Trendelenburg positioning with right side up.  We then grasped the dome of the gallbladder and lifted it over the liver in the standard fashion.  We immediately saw that the gallbladder was extremely inflamed, and actually gangrenous.  There was a cc of purulent exudative fluid in the abdomen.  We suctioned this out.  We then opened the dome of the gallbladder with electrocautery and suctioned out the gallbladder.  We were then finally able to grasp it and elevate over the liver in the standard fashion.  We then slowly and meticulously dissected down the inflammatory rind off of the gallbladder.  For a while, it was unclear where the duodenum or transverse colon were, as they were adherent to the wall of the gallbladder.  With about 1.25 hours of dissection, we did finally get down to the infundibulum.  This required a great deal of irrigation in patients.  We then inspected the infundibulum and opened the peritoneum over the infundibulum extending it medially and laterally to the reflective edges of the liver.  This gave us more mobility on the infundibulum.  We then began dissecting on the triangle of Calot.  We eventually elevated the infundibulum off of the cystic plate of the liver.  We had two tubular structures entering the infundibulum with nothing returning to the liver.  We had a window between these structures and could see nothing but liver on the other side.  We then activated our firefly camera.  He had previously received indocyanine green in the preoperative area.  We could then see the liver lighting up green, liquid trace  the common bile duct lighting up drain from the liver down to the duodenum.  We could then see 1 tubular structure tracking from the common bile duct up to the infundibulum lighting up green.  The other tubular structure did not light up green.  We thus obtained our critical view of safety, identifying our cystic duct and cystic artery.  We then clipped across the artery with two clips down and one clip up.  Immediately prior to doing this, anesthesia gave another bolus of indocyanine green, and we could see the artery lighting up as she would expect.  There was still a great deal of inflammatory rind around the cystic duct, we therefore elected to proceed at this point with a top-down dissection.  We then used electrocautery to dissect the gallbladder from the dome down to our infundibulum dissection plane.  After doing this, we reactivated the firefly, and did not see any other structures going from what we were calling the infundibulum back to the liver.  We therefore decided to ligate and transect our cystic duct.  We used 2 Endoloops that were 0 PDS to ligate the duct above the level of large cystic artery clips.  We then transected the duct above our Endoloops.  We then placed one 5 mm clip on the cystic duct as a marker.  We then used an Endo Catch bag to extract the gallbladder through the epigastric 5 mm port site after upsizing.  We then closed the fascia of this port site with an interrupted figure-of-eight 0 Vicryl suture on a [facility] suture passer.  We then instilled Ti into the gallbladder fossa to prevent postoperative bleeding.  We then placed a 19 Ivorian Tanner drain in the gallbladder fossa and took it out through the lateralmost 5 mm port.  We then removed the remaining ports under direct visualization.  We desufflated the abdomen.  We closed the skin of all of our port sites with subcuticular 4-0 Vicryl suture.  Dermabond was applied over top.  The entire dissection took us 2 hours and  was quite difficult.        DISPOSITION:   The patient tolerated the procedure well.  There were no immediate complications.  He will be brought to the postanesthesia care unit. From there, he will be returned to the stepdown unit for ongoing care.      I was present and scrubbed in for the entire procedure.      CPT Code:  65989-36      Operating Room Staff:  CRNA: SARAI Martin-CRNA  Anesthesia Break User: Nilton Mcgovern MD  Circulator: Yolis Greene RN  Scrub Person: Kedar Sandoval MD  General Surgery  Office: [phone]  Fax:     [phone]  3:09 PM  10/29/23

## 2023-10-29 NOTE — PERIOPERATIVE NURSING NOTE
Patient arrived in pacu at 1502, cardizem gtt off in OR, HR 120s to 140s , Metoprolol given per anesthesia and Dr Mcgovern restarted diltizem gtt at 5mg/hr, bps monitored q 5 min, remains in afib, o2 on arrival at 2 LNC has been titated to 4L NC ffor sat at this time of 95, 19fr yue drain to bulb sx emptied for 35 ml sang. Drainage, patient remains drowsey but awakens to voice,  denied pain at this time , abdominal incisions x4 closed with liquiband, and drain site with dressing dry and intact.

## 2023-10-29 NOTE — CARE PLAN
The patient's goals for the shift include  Patient going to surgery    The clinical goals for the shift include Pt. will have a BM by the end of this shift.    Over the shift, the patient did not make progress toward the following goals. Barriers to progression include patient will participate in care.  Recommendations to address these barriers include encouragement.

## 2023-10-29 NOTE — ANESTHESIA PREPROCEDURE EVALUATION
Patient: Santy Siddiqi    Procedure Information       Date/Time: 10/29/23 1150    Procedure: Cholecystectomy Laparoscopy    Location: [facility] / [facility]    Surgeons: Wade Sandoval MD     Vitals:    10/29/23 1115   BP: 99/58   Pulse: 105   Resp: 18   Temp: 37.2 °C (99 °F)   SpO2: 96%       Past Surgical History:   Procedure Laterality Date   • COLONOSCOPY  11/07/2017    Colonoscopy   • HERNIA REPAIR     • NOSE SURGERY  11/07/2017    Nose Surgery     Past Medical History:   Diagnosis Date   • Inadequate sleep hygiene     History of difficulty sleeping   • Overactive bladder     Overactive bladder   • Personal history of colonic polyps     History of colonic polyps   • Personal history of other diseases of male genital organs     History of benign prostatic hyperplasia   • Personal history of other diseases of the digestive system     History of diverticulitis   • Personal history of other diseases of the digestive system     History of hemorrhoids   • Personal history of other diseases of the musculoskeletal system and connective tissue     History of degenerative disc disease   • Personal history of other specified conditions     History of fatigue       Current Facility-Administered Medications:   •  [DISCONTINUED] acetaminophen (Tylenol) tablet 650 mg, 650 mg, oral, q4h PRN **OR** acetaminophen (Tylenol) oral liquid 650 mg, 650 mg, nasogastric tube, q6h, 650 mg at 10/29/23 0847 **OR** [DISCONTINUED] acetaminophen (Tylenol) suppository 650 mg, 650 mg, rectal, q4h PRN, Fortunato Baker MD  •  albuterol 2.5 mg /3 mL (0.083 %) nebulizer solution 2.5 mg, 2.5 mg, nebulization, q2h PRN, Fortunato Baker MD  •  budesonide (Pulmicort) 0.5 mg/2 mL nebulizer solution 0.5 mg, 0.5 mg, nebulization, BID, Fortunato Baker MD, 0.5 mg at 10/29/23 0904  •  dextrose 5 % and sodium chloride 0.45 % with KCl 20 mEq/L infusion, 100 mL/hr, intravenous, Continuous, Fortunato Baker MD, Last Rate: 100 mL/hr at  10/29/23 1010, 100 mL/hr at 10/29/23 1010  •  dilTIAZem (Cardizem) 125 mg in dextrose 5% 125 mL (1 mg/mL) infusion (premix), 5-15 mg/hr, intravenous, Continuous, Fortunato Baker MD, Last Rate: 15 mL/hr at 10/29/23 1010, 15 mg/hr at 10/29/23 1010  •  HYDROmorphone (Dilaudid) injection 0.2 mg, 0.2 mg, intravenous, q2h PRN, Fortunato Baker MD  •  HYDROmorphone (Dilaudid) injection 0.4 mg, 0.4 mg, intravenous, q2h PRN, Fortunato Baker MD, 0.4 mg at 10/28/23 1509  •  ipratropium-albuteroL (Duo-Neb) 0.5-2.5 mg/3 mL nebulizer solution 3 mL, 3 mL, nebulization, TID, Fortunato Baker MD, 3 mL at 10/29/23 0907  •  ketorolac (Toradol) injection 15 mg, 15 mg, intravenous, q6h, Fortunato Baker MD, 15 mg at 10/29/23 0847  •  metoclopramide (Reglan) injection 5 mg, 5 mg, intravenous, q6h RICH, Fortunato Baker MD, 5 mg at 10/29/23 0622  •  metoprolol tartrate (Lopressor) injection 3 mg, 3 mg, intravenous, Once, Nilton Mcgovern MD  •  metoprolol tartrate (Lopressor) injection 5 mg, 5 mg, intravenous, q15 min PRN, Fortunato Baker MD, 5 mg at 10/28/23 2116  •  [DISCONTINUED] ondansetron (Zofran) tablet 4 mg, 4 mg, oral, q8h PRN **OR** ondansetron (Zofran) injection 4 mg, 4 mg, intravenous, q6h PRN, Fortunato Baker MD, 4 mg at 10/28/23 1216  •  oxygen (O2) therapy, , inhalation, Continuous PRN - O2/gases, Fortunato Baker MD, 2 L/min at 10/28/23 0810  •  [DISCONTINUED] pantoprazole (ProtoNix) EC tablet 40 mg, 40 mg, oral, Daily before breakfast **OR** pantoprazole (ProtoNix) injection 40 mg, 40 mg, intravenous, BID, Fortunato Baker MD, 40 mg at 10/29/23 0847  •  piperacillin-tazobactam-dextrose (Zosyn) IV 3.375 g, 3.375 g, intravenous, q6h, Wade Sandoval MD, Stopped at 10/29/23 1057  •  polyethylene glycol (Glycolax, Miralax) packet 17 g, 17 g, oral, TID, Fortunato Baker MD, 17 g at 10/29/23 0847  •  sucralfate (Carafate) suspension 1 g, 1 g, nasogastric tube, q6h RICH, Fortunato Baker MD, 1 g  at 10/29/23 0622  •  tamsulosin (Flomax) 24 hr capsule 0.4 mg, 0.4 mg, oral, Daily, Fortunato Baker MD, 0.4 mg at 10/29/23 0847  Prior to Admission medications    Medication Sig Start Date End Date Taking? Authorizing Provider   albuterol 90 mcg/actuation inhaler Inhale 2 puffs every 6 hours if needed for wheezing.    Historical Provider, MD   clobetasol (Temovate) 0.05 % cream Apply sparingly to affected area(s) twice daily for up to one week at a time 9/20/19   Historical Provider, MD   ipratropium (Atrovent) 21 mcg (0.03 %) nasal spray use 2 sprays in each nostril in the morning and 2 sprays in the evening and 2 sprays before bedtime 7/31/23   Gage Hobbs MD   omeprazole (PriLOSEC) 40 mg DR capsule Take 1 capsule (40 mg) by mouth once daily. 9/12/23   Gage Hobbs MD   Symbicort 160-4.5 mcg/actuation inhaler INHALE TWO PUFFS BY MOUTH TWO TIMES A DAY rinse mouth after use 7/31/23   Gage Hobbs MD   tamsulosin (Flomax) 0.4 mg 24 hr capsule Take 1 capsule (0.4 mg) by mouth once daily. 9/12/23   Gage Hobbs MD     No Known Allergies  Social History     Tobacco Use   • Smoking status: Former     Types: Cigarettes   • Smokeless tobacco: Never   Substance Use Topics   • Alcohol use: Yes     Comment: glass of wine 2-3 times per year         Chemistry    Lab Results   Component Value Date/Time     (L) 10/28/2023 2106    K 3.8 10/28/2023 2106    CL 98 10/28/2023 2106    CO2 26 10/28/2023 2106    BUN 11 10/28/2023 2106    CREATININE 0.85 10/28/2023 2106    Lab Results   Component Value Date/Time    CALCIUM 8.9 10/28/2023 2106    ALKPHOS 60 10/28/2023 2106    AST 30 10/28/2023 2106    ALT 27 10/28/2023 2106    BILITOT 1.2 10/28/2023 2106          Lab Results   Component Value Date/Time    WBC 12.7 (H) 10/29/2023 0545    HGB 15.1 10/29/2023 0545    HCT 45.5 10/29/2023 0545     10/29/2023 0545     Lab Results   Component Value Date/Time    PROTIME 11.8 10/28/2023 2106    INR 1.0 10/28/2023  2106     No results found for this or any previous visit (from the past 4464 hour(s)).  No results found for this or any previous visit from the past 1095 days.        Relevant Problems   Cardiovascular   (+) Combined hyperlipidemia      GI   (+) GERD without esophagitis      Pulmonary   (+) Chronic bronchitis (CMS/HCC)   (+) Moderate persistent asthma without complication       Clinical information reviewed:   Tobacco  Allergies  Meds  Problems  Med Hx  Surg Hx   Fam Hx  Soc   Hx        NPO Detail:  No data recorded     Physical Exam    Airway  Mallampati: II  TM distance: >3 FB  Neck ROM: full     Cardiovascular   Rhythm: irregular  Rate: abnormal     Dental    Pulmonary - normal exam     Abdominal - normal exam         Anesthesia Plan    ASA 3 - emergent     general     intravenous induction   Anesthetic plan and risks discussed with patient.  Use of blood products discussed with patient who.    Plan discussed with CRNA.

## 2023-10-29 NOTE — PERIOPERATIVE NURSING NOTE
Pt sleepy, arousable. Vss. A-fib. Rate 90's to low 100. Dr Mcgovern aware. Iv LR at 100/hr. 4 lap sites intact with liquiband. JUAN to bulb suction. Scd's on bilat. Pt denies discomfort. Wife at bedside.

## 2023-10-29 NOTE — ANESTHESIA PROCEDURE NOTES
Airway  Date/Time: 10/29/2023 12:45 PM  Urgency: elective    Airway not difficult    Staffing  Performed: CRNA   Authorized by: MAKENZIE Martin    Performed by: MAKENZIE Martin  Patient location during procedure: OR    Indications and Patient Condition  Indications for airway management: anesthesia  Spontaneous ventilation: present  Sedation level: minimal  Preoxygenated: yes  Patient position: reverse Trendelenburg  Mask difficulty assessment: 0 - not attempted    Final Airway Details  Final airway type: endotracheal airway      Successful airway: ETT  Cuffed: yes   Successful intubation technique: video laryngoscopy  Facilitating devices/methods: cricoid pressure and intubating stylet  Endotracheal tube insertion site: oral  Blade: Oscar  Blade size: #4  ETT size (mm): 7.5  Cormack-Lehane Classification: grade I - full view of glottis  Placement verified by: chest auscultation and capnometry   Cuff volume (mL): 10  Measured from: lips  ETT to lips (cm): 22  Number of attempts at approach: 1

## 2023-10-29 NOTE — PROGRESS NOTES
General Surgery Daily Progress Note      Subjective:  The patient is stable this morning.  He was transferred to stepdown due to new onset of atrial fibrillation overnight.  He was started on a heparin drip that has since been stopped.  He reports that his abdominal pain is well controlled with the pain medication.  He has not had any bowel movements.  He did have a fever late last night/early this morning.        Objective:  Vitals:   Vitals:    10/29/23 1115   BP: 99/58   Pulse: 105   Resp: 18   Temp: 37.2 °C (99 °F)   SpO2: 96%       Physical Exam:   General: No acute distress. Sitting up in bed.   Neuro: Alert and oriented ×3. Follows commands.  Head: Atraumatic  Eyes: Pupils equal reactive to light. Extraocular motions intact.  Ears: Hears normal speaking voice.  Neck: Supple. No appreciable masses.  Heart: Regular rate  Lungs: No audible wheeze.  Breathing comfortably.  Abdomen: Soft. Nondistended.  Right upper quadrant tenderness.  Well-healed laparoscopic scars.  Musculoskeletal: Moves all extremities.  Normal range of motion.  Skin: No rashes or lesions.  Psychological: Normal affect        Labs:  CBC:   Lab Results   Component Value Date    WBC 12.7 (H) 10/29/2023    RBC 4.98 10/29/2023    HGB 15.1 10/29/2023    HCT 45.5 10/29/2023     10/29/2023       RFP:   Lab Results   Component Value Date     (L) 10/28/2023    K 3.8 10/28/2023    CL 98 10/28/2023    CO2 26 10/28/2023    BUN 11 10/28/2023    CREATININE 0.85 10/28/2023    CALCIUM 8.9 10/28/2023    MG 1.74 10/28/2023    PHOS 2.9 10/28/2023        LFTs:   Lab Results   Component Value Date    PROT 7.1 10/28/2023    ALBUMIN 4.1 10/28/2023    BILITOT 1.2 10/28/2023    ALKPHOS 60 10/28/2023    AST 30 10/28/2023    ALT 27 10/28/2023              Images:  I personally reviewed the images and the radiologist's report.  Abdominal x-rays: Gastroparesis with eventual passage of contrast into the colon    Right upper quadrant ultrasound: Distended  gallbladder with gallbladder wall thickening      Assessment:  This is a 73 y.o. year old male who is admitted for abdominal pain and dry heaves.  Surgery has been consulted for a bowel obstruction.  We discussed that I do not believe that he has a bowel obstruction, especially since contrast is passed into his colon.  However, he does have gastroparesis as it took multiple hours for the contrast to leave his stomach.  We discussed that gastritis and/or ulcer disease is a possibility for the gastroparesis, and I therefore recommend an EGD.  We also discussed that symptoms are most consistent with acute cholecystitis, and I recommend a laparoscopic cholecystectomy.    Plan:  -- Plan for a laparoscopic cholecystectomy with EGD at the same time        Wade Sandoval MD  General Surgery  Office: [phone]  Fax: [phone]  11:48 AM  10/29/23

## 2023-10-29 NOTE — CONSULTS
Consults  History Of Present Illness:    Santy Siddiqi is a very pleasant 73 year old gentleman with a history of HLD, asthma, and GERD, he initially presented to the ER with abdominal pain, concern for a bowel obstruction. Last night he noticed his heart was racing. EKG showed atrial fibrillation with RVR heart rate 140's. Denies recurrent heart palpitations. Denies chest pain or shortness of breath. Complains of abdominal pain.      Last Recorded Vitals:  Vitals:    10/28/23 2351 10/29/23 0035 10/29/23 0631 10/29/23 0907   BP: 111/75 124/86 110/68    BP Location:   Right arm    Patient Position:   Lying    Pulse: (!) 135 (!) 128 107    Resp:   20    Temp:   38.4 °C (101.1 °F)    TempSrc:   Temporal    SpO2:   92% 94%   Weight:       Height:           Last Labs:  CBC - 10/29/2023:  5:45 AM  12.7 15.1 255    45.5      CMP - 10/28/2023:  9:06 PM  8.9 7.1 30 --- 1.2   2.9 4.1 27 60      PTT - 10/28/2023:  9:06 PM  1.0   11.8 31     Troponin I, High Sensitivity   Date/Time Value Ref Range Status   10/29/2023 03:24  (HH) 0 - 20 ng/L Final     Comment:     Previous result verified on 10/28/2023 2139 on specimen/case [id number] called with component [facility] for procedure Troponin I, High Sensitivity with value 485 ng/L.   10/28/2023 09:06  (HH) 0 - 20 ng/L Final   10/27/2023 08:05 PM 7 0 - 20 ng/L Final     VLDL   Date/Time Value Ref Range Status   04/14/2021 08:33 AM 23 0 - 40 mg/dL Final   11/19/2019 09:04 AM 25 0 - 40 mg/dL Final   12/19/2017 10:56 AM 27 0 - 40 mg/dL Final      Last I/O:  I/O last 3 completed shifts:  In: 2006.2 (24.6 mL/kg) [I.V.:506.2 (6.2 mL/kg); IV Piggyback:1500]  Out: 600 (7.3 mL/kg) [Urine:600 (0.2 mL/kg/hr)]  Weight: 81.6 kg     Past Cardiology Tests (Last 3 Years):  EKG:  EKG independently reviewed showed atrial fibrillation with RVR heart rate 148 bpm     Echo:  No results found for this or any previous visit from the past 1095 days.    Ejection Fractions:  No results found  "for: \"EF\"  Cath:  No results found for this or any previous visit from the past 1095 days.    Stress Test:  No results found for this or any previous visit from the past 1095 days.    Cardiac Imaging:  No results found for this or any previous visit from the past 1095 days.      Past Medical History:  He has a past medical history of Inadequate sleep hygiene, Overactive bladder, Personal history of colonic polyps, Personal history of other diseases of male genital organs, Personal history of other diseases of the digestive system, Personal history of other diseases of the digestive system, Personal history of other diseases of the musculoskeletal system and connective tissue, and Personal history of other specified conditions.    Past Surgical History:  He has a past surgical history that includes Nose surgery (11/07/2017); Colonoscopy (11/07/2017); and Hernia repair.      Social History:  He reports that he has quit smoking. His smoking use included cigarettes. He has never used smokeless tobacco. He reports current alcohol use. He reports that he does not use drugs.    Family History:  Family History   Problem Relation Name Age of Onset    Pancreatic cancer Mother      Cirrhosis Father          Allergies:  Patient has no known allergies.    Inpatient Medications:  Scheduled medications   Medication Dose Route Frequency    acetaminophen  650 mg nasogastric tube q6h    budesonide  0.5 mg nebulization BID    ipratropium-albuteroL  3 mL nebulization TID    ketorolac  15 mg intravenous q6h    metoclopramide  5 mg intravenous q6h RICH    pantoprazole  40 mg intravenous BID    piperacillin-tazobactam  3.375 g intravenous q6h    polyethylene glycol  17 g oral TID    sucralfate  1 g nasogastric tube q6h RICH    tamsulosin  0.4 mg oral Daily     PRN medications   Medication    albuterol    HYDROmorphone    HYDROmorphone    metoprolol    ondansetron    oxygen     Continuous Medications   Medication Dose Last Rate    potassium " xkbhayh-X5-7.45%NaCl  100 mL/hr 100 mL/hr (10/29/23 0642)    dilTIAZem  5-15 mg/hr 15 mg/hr (10/29/23 7351)    heparin  0-4,500 Units/hr 1,168.8 Units/hr (10/29/23 2234)     Outpatient Medications:  Current Outpatient Medications   Medication Instructions    albuterol 90 mcg/actuation inhaler 2 puffs, inhalation, Every 6 hours PRN    clobetasol (Temovate) 0.05 % cream Apply sparingly to affected area(s) twice daily for up to one week at a time    ipratropium (Atrovent) 21 mcg (0.03 %) nasal spray use 2 sprays in each nostril in the morning and 2 sprays in the evening and 2 sprays before bedtime    omeprazole (PRILOSEC) 40 mg, oral, Daily    Symbicort 160-4.5 mcg/actuation inhaler 2 puffs, inhalation, 2 times daily, RINSE MOUTH AFTER USE    tamsulosin (FLOMAX) 0.4 mg, oral, Daily       Physical Exam:  Physical Exam  Vitals reviewed.   HENT:      Head: Normocephalic.      Nose: Nose normal.   Eyes:      Pupils: Pupils are equal, round, and reactive to light.   Cardiovascular:      Rate and Rhythm: Irregularly Irregular   Pulmonary:      Effort: Pulmonary effort is normal.      Breath sounds: Normal breath sounds.   Abdominal:      General: Abdomen is flat.      Palpations: Abdomen is soft.   Musculoskeletal:         General: Normal range of motion.      Cervical back: Normal range of motion.   Skin:     General: Skin is warm and dry.   Neurological:      General: No focal deficit present.      Mental Status: He is alert and oriented to person, place, and time.   Psychiatric:         Mood and Affect: Mood normal.          Assessment/Plan   Mr. Siddiqi is a very pleasant 73 year old gentleman with a history of HLD, GERD, hiatal hernia, presented for abdominal pain, concerns for an obstruction. Last night started having heart palpitations, EKG showed atrial fibrillation with RVR, was given two doses of Lopressor and started on a Cardizem drip. Remains in atrial fibrillation, heart rate controlled. Continue on Heparin  drip for now will decide long term anticoagulation after a decision has been made about surgery. CHADSVASC score is 1. Continue Cardizem drip.     Assessment   Atrial fibrillation   Acute abdominal pain     Plan   -continue Cardizem drip   -continue Heparin drip   -Echocardiogram     Peripheral IV 10/27/23 20 G Left Antecubital (Active)   Site Assessment Clean;Dry;Intact 10/29/23 0800   Dressing Status Clean;Dry 10/29/23 0800   Number of days: 2       Peripheral IV 10/28/23 20 G Right;Dorsal Hand (Active)   Site Assessment Clean;Dry;Intact 10/29/23 0800   Dressing Status Clean;Dry 10/29/23 0800   Number of days: 1       Peripheral IV 10/28/23 Distal;Left;Ventral Forearm (Active)   Site Assessment Clean;Dry;Intact 10/29/23 0800   Dressing Status Clean;Dry 10/29/23 0800   Number of days: 1       NG/OG/Feeding Tube NG - [place] sump 14 Fr Left nostril (Active)   Site Assessment Clean;Dry;Intact 10/29/23 0548   Number of days: 2       Code Status:  Full Code          Angela Aguilera, APRN-CNP

## 2023-10-29 NOTE — ANESTHESIA POSTPROCEDURE EVALUATION
Patient: Santy Siddiqi    Procedure Summary       Date: 10/29/23 Room / Location: [facility] OR 06 / [facility]    Anesthesia Start: 1236 Anesthesia Stop: 1518    Procedures:       Cholecystectomy Laparoscopy      Esophagogastroduodenoscopy (Esophagus) Diagnosis:       Cholecystitis      (Cholecystitis [K81.9])    Surgeons: Wade Sandoval MD Responsible Provider: MAKENZIE Martin    Anesthesia Type: general ASA Status: 3 - Emergent            Anesthesia Type: general    Vitals Value Taken Time   /74 10/29/23 1502   Temp 36.3 °C (97.3 °F) 10/29/23 1502   Pulse 117 10/29/23 1502   Resp 18 10/29/23 1502   SpO2 94 % 10/29/23 1502       Anesthesia Post Evaluation    Patient location during evaluation: PACU  Patient participation: complete - patient participated  Level of consciousness: awake  Pain score: 3  Pain management: adequate  Multimodal analgesia pain management approach  Airway patency: patent  Two or more strategies used to mitigate risk of obstructive sleep apnea  Cardiovascular status: blood pressure returned to baseline, tachycardic and stable  Respiratory status: acceptable  Hydration status: acceptable  Comments: Fib and sinus rhythms pt stable but tachy - on cardizem drip 5mg/ml and given metoprolol 3 mg ivp in pacu    No notable events documented.

## 2023-10-29 NOTE — PROGRESS NOTES
Santy Siddiqi is a 73 y.o. male on day 2 of admission presenting with Intestinal obstruction, unspecified cause, unspecified whether partial or complete (CMS/HCC).      Subjective   Pt flipped into A fib overnight.        Objective     Last Recorded Vitals  /68 (BP Location: Right arm, Patient Position: Lying)   Pulse 107   Temp 38.4 °C (101.1 °F) (Temporal)   Resp 20   Wt 81.6 kg (180 lb)   SpO2 92%   Intake/Output last 3 Shifts:    Intake/Output Summary (Last 24 hours) at 10/29/2023 0735  Last data filed at 10/29/2023 0302  Gross per 24 hour   Intake 1508.67 ml   Output --   Net 1508.67 ml       Admission Weight  Weight: 81.6 kg (180 lb) (10/27/23 1849)    Daily Weight  10/28/23 : 81.6 kg (180 lb)      Physical Exam  Gen: lying comfortably in bed, not in acute distress  HEENT: atraumatic, normocephalic; NGT present with clear green output  Pulm: normal respiratory effort, clear to auscultation b/l  Cardiac: irregularly irregular, tachycardic, no murmurs noted, normal S1/S2  GI: Soft, tender especially in RUQ  MSK: normal ROM without joint swelling  Extremities: no LE edema, cyanosis  Neuro: AOX3, CN II-XII grossly intact, equal b/l strength, no loss in sensation   Psych: calm and appropriate for situation    Relevant Results  Scheduled medications  acetaminophen, 650 mg, nasogastric tube, q6h  budesonide, 0.5 mg, nebulization, BID  ipratropium-albuteroL, 3 mL, nebulization, TID  ketorolac, 15 mg, intravenous, q6h  metoclopramide, 5 mg, intravenous, q6h RICH  metoprolol tartrate, , ,   pantoprazole, 40 mg, intravenous, BID  polyethylene glycol, 17 g, oral, TID  sucralfate, 1 g, nasogastric tube, q6h RICH  tamsulosin, 0.4 mg, oral, Daily      Continuous medications  potassium ldqsmks-V1-1.45%NaCl, 100 mL/hr, Last Rate: 100 mL/hr (10/29/23 0642)  dilTIAZem, 5-15 mg/hr, Last Rate: 15 mg/hr (10/29/23 0302)  heparin, 0-4,500 Units/hr, Last Rate: 1,168.8 Units/hr (10/29/23 0645)      PRN medications  PRN  medications: albuterol, HYDROmorphone, HYDROmorphone, metoprolol tartrate, metoprolol, [DISCONTINUED] ondansetron **OR** ondansetron, oxygen    Results for orders placed or performed during the hospital encounter of 10/27/23 (from the past 24 hour(s))   Red Top   Result Value Ref Range    Extra Tube Hold for add-ons.    Lavender Top   Result Value Ref Range    Extra Tube Hold for add-ons.    SST TOP   Result Value Ref Range    Extra Tube Hold for add-ons.    Gray Top   Result Value Ref Range    Extra Tube Hold for add-ons.    Comprehensive metabolic panel   Result Value Ref Range    Glucose 122 (H) 74 - 99 mg/dL    Sodium 132 (L) 136 - 145 mmol/L    Potassium 3.8 3.5 - 5.3 mmol/L    Chloride 98 98 - 107 mmol/L    Bicarbonate 26 21 - 32 mmol/L    Anion Gap 12 10 - 20 mmol/L    Urea Nitrogen 11 6 - 23 mg/dL    Creatinine 0.85 0.50 - 1.30 mg/dL    eGFR >90 >60 mL/min/1.73m*2    Calcium 8.9 8.6 - 10.3 mg/dL    Albumin 4.1 3.4 - 5.0 g/dL    Alkaline Phosphatase 60 33 - 136 U/L    Total Protein 7.1 6.4 - 8.2 g/dL    AST 30 9 - 39 U/L    Bilirubin, Total 1.2 0.0 - 1.2 mg/dL    ALT 27 10 - 52 U/L   Magnesium   Result Value Ref Range    Magnesium 1.74 1.60 - 2.40 mg/dL   Phosphorus   Result Value Ref Range    Phosphorus 2.9 2.5 - 4.9 mg/dL   Troponin I, High Sensitivity   Result Value Ref Range    Troponin I, High Sensitivity 485 (HH) 0 - 20 ng/L   Coagulation Screen   Result Value Ref Range    Protime 11.8 9.8 - 12.8 seconds    INR 1.0 0.9 - 1.1    aPTT 31 27 - 38 seconds   Troponin I, High Sensitivity   Result Value Ref Range    Troponin I, High Sensitivity 362 (HH) 0 - 20 ng/L   Heparin Assay, UFH   Result Value Ref Range    Heparin Unfractionated 1.2 (HH) See Comment Below for Therapeutic Ranges IU/mL   CBC   Result Value Ref Range    WBC 12.7 (H) 4.4 - 11.3 x10*3/uL    nRBC 0.0 0.0 - 0.0 /100 WBCs    RBC 4.98 4.50 - 5.90 x10*6/uL    Hemoglobin 15.1 13.5 - 17.5 g/dL    Hematocrit 45.5 41.0 - 52.0 %    MCV 91 80 - 100 fL     MCH 30.3 26.0 - 34.0 pg    MCHC 33.2 32.0 - 36.0 g/dL    RDW 12.8 11.5 - 14.5 %    Platelets 255 150 - 450 x10*3/uL    MPV 10.2 7.5 - 11.5 fL   Heparin Assay, UFH   Result Value Ref Range    Heparin Unfractionated 0.5 See Comment Below for Therapeutic Ranges IU/mL       XR abdomen 1 view    Result Date: 10/28/2023  STUDY: Abdomen Radiographs;  10/28/2023 7:22 PM INDICATION: Small bowel obstruction followup.  Ng2 gastrografin at 4:30PM. COMPARISON: XR Abdomen 10/27/2023; CT Abdomen Pelvis 10/27/2023. ACCESSION NUMBER(S): [id number] ORDERING CLINICIAN: TECHNIQUE:  One view (two images) of the abdomen. FINDINGS:  Moderately prominent loops of small bowel mid abdomen are present, likely related to ileus.  Enteric tube terminates in stomach.  There are no convincing calculi or abnormal calcifications.  No focal osseous abnormalities.      Gastric termination of enteric tube. Signed by Devon Flor II, MD    XR abdomen 3+ views    Result Date: 10/28/2023  Interpreted By:  Addis Mckeon, STUDY: XR ABDOMEN 3+ VIEWS;  10/28/2023 4:38 pm   INDICATION: Signs/Symptoms:Small bowel obstruction.   COMPARISON: CT of the abdomen and pelvis study dated 10/27/2023.   ACCESSION NUMBER(S): [id number]   ORDERING CLINICIAN: SALVADOR WIGGINS   FINDINGS: Nonobstructive bowel gas pattern. Limited evaluation of pneumoperitoneum on supine imaging, however no gross evidence of free air is noted.   Visualized lungs are clear.   Osseous structures demonstrate no acute bony changes. Contrast opacification is visualized to the level of the 3rd segment of the duodenum. Large amount of stool is visualized in the ascending colon. NG tube tip in the gastric body.       1.  Nonobstructive bowel gas pattern. 2. Contrast opacification is visualized to the level of the 3rd segment of the duodenum.   MACRO: None.   Signed by: Addis Mckeon 10/28/2023 5:41 PM Dictation workstation:   [id number]    US gallbladder    Result Date:  "10/28/2023  Interpreted By:  Leander Bhatti, STUDY: US GALLBLADDER;  10/28/2023 4:16 pm   INDICATION: Signs/Symptoms:RUQ pain, r/o cholecystitis.   COMPARISON: Yesterday's enhanced abdominal and pelvic CT.   ACCESSION NUMBER(S): [id number]   ORDERING CLINICIAN: SALVADOR WIGGINS   TECHNIQUE: Multiple images of the right upper quadrant were obtained.   FINDINGS: LIVER: Normal size with a normal homogeneous echotexture. The right hepatic lobe measures 15.7 cm in length. A very tiny anterosuperior right lobe lesion demonstrated on image 26 series 201 of yesterday's CT is too small to appreciate, but is statistically likely to be a cyst otherwise of no significance.   GALLBLADDER: The gallbladder is distended. Its wall is thickened and hyperechoic without pericholecystic edema. In addition to a small amount of hypoechoic material typical for \"sludge\" in the dependent gallbladder, there are small echogenic dependent gallbladder foci suspicious for gallstones that however do not show far acoustic shadowing. Negative sonographic Reynolds's sign according to the technologist.   BILIARY TREE: No intrahepatic biliary dilation. The common bile duct could not be visualized.   PANCREAS: Obscured by gas and can not be evaluated.   RIGHT KIDNEY: The right kidney is normal in size, measuring 9.7 cm in craniocaudal dimension. The renal cortical echogenicity and thickness are within normal limits. No hydronephrosis or renal mass is seen.       Distended gallbladder with wall thickening, \"sludge\" and probably a few small gallstones.   MACRO: None   Signed by: Leander Bhatti 10/28/2023 4:49 PM Dictation workstation:   [id number]    XR abdomen 1 view    Result Date: 10/27/2023  STUDY: Abdomen Radiographs;  10/27/2023 at 9:46 p.m. INDICATION: NG tube placement. COMPARISON: CT AP 10/27/2023. ACCESSION NUMBER(S): [id number] ORDERING CLINICIAN: TECHNIQUE:  One view(s) of the abdomen. FINDINGS:  Enteric tube terminates in the " expected region of the gastric antrum in satisfactory location.  Visualized upper abdomen otherwise grossly unremarkable.      Enteric tube as described. Signed by Joby Oneil MD    CT abdomen pelvis w IV contrast    Result Date: 10/27/2023  STUDY: CT Abdomen and Pelvis with IV Contrast; 10/27/2023 7:50 PM INDICATION: Epigastric pain, nausea, and vomiting. COMPARISON: XR chest 01/21/2023.  ACCESSION NUMBER(S): [id number] ORDERING CLINICIAN: FLORENCIO AGUERO TECHNIQUE: CT of the abdomen and pelvis was performed.  Contiguous axial images were obtained at 3 mm slice thickness through the abdomen and pelvis. Coronal and sagittal reconstructions at 3 mm slice thickness were performed.  Omnipaque 350:75 mL was administered intravenously.  FINDINGS: Abdomen: No significant abnormalities are detectable in the liver, spleen, pancreas, adrenal glands, or kidneys. There are no renal calculi and there is no hydronephrosis. Gallbladder is minimally distended with suggestion of subtle calculi. There is no gallbladder wall thickening or biliary duct dilatation. The stomach is moderately distended with air and fluid. Proximal small bowel loops are non-distended. Small bowel loops in the right lower quadrant are minimally air and fluid distended, approaching 2 cm diameter. There is no appreciable small bowel thickening. Region of the appendix is unremarkable. Scattered stool is seen throughout the colon. There is no colitis or diverticulitis. Abdominal aorta and IVC are normal caliber and patent. There is no bulky or localizing lymphadenopathy. There is no ascites. Pelvis: Prostate gland is minimally enlarged. Bladder is normally distended. Deep pelvic veins are patent. Small bilateral inguinal hernias contain fat. Skeletal: There is minimal degenerative arthritis in the spine, especially in the lower thoracic spine. Lower chest: No infiltrates are seen. There are no pleural effusions. The heart is normal size. There is a small  hiatal hernia.    1. Small bowel loops in the right lower quadrant are minimally air and fluid distended: cannot exclude early and/or developing small bowel obstruction. The stomach is also moderately distended with air and fluid. Close follow-up advised. 2. Gallbladder is distended with suggestion of small stones. No gallbladder wall thickening or biliary duct dilatation. 3. Remainder as above. Signed by Jaylon Tejada MD                 Assessment/Plan   This patient currently has cardiac telemetry ordered; if you would like to modify or discontinue the telemetry order, click here to go to the orders activity to modify/discontinue the order.              Principal Problem:    Intestinal obstruction, unspecified cause, unspecified whether partial or complete (CMS/HCC)  Active Problems:    Cholecystitis    74 y/o M w/PMH of asthma, GERD, BPH presenting w/abdominal pain, n/v; CT imaging concerning for early SBO.     Acute Abdominal Pain  -CT A/P concerning for early SBO  -NGT placed in ED and NPO for bowel rest  -General surgery consulted - discussed with Dr. Sandoval prior to him seeing patient but does not feel this is a SBO  -On IVF, antiemetics  -Lactate normalized  -Normal LFT's, Bilirubin  -Pain regimen adjusted - Morphine dose increased due to acute pain on exam  -Afebrile. No leukocytosis.      2. Atrial fibrillation: new onset; still in a fib this morning but rate controlled  -cards consulted  -cardizem drip  -heparin drip  -TTE ordered; anticipate to be done tomorrow    3. Asthma  -No objective/subjective signs of acute exacerbation at this time  -On Budesonide, Duonebs     4. GERD  -On PPI     DVT Prophylaxis  -heparin drip       Fluids/Electrolytes/Nutrition  -Laboratory data reviewed.   -Electrolytes stable.   -No nutritional concerns at this time.       Disposition: anticipate home with spouse when clinically improved          Jarrell Abel, DO

## 2023-10-29 NOTE — SIGNIFICANT EVENT
Patient on med/surg floor noted to be tachy into the 140s+; EKG showed afib rvr. Patient without known cardiac history; upon further questioning, he states he has had several episodes in the past of palpitations and occasional associated SOB. States it was never worked up and has not seen cardio as outpt. Patient asymptomatic; denies CP, SOB, palpitations, lightheadedness. Received IV lopresssor 5mg x2; HR remained elevated. Patient upgraded to stepdown and cardizem gtt initiated. Repeat EKG pending. Initial trop uptrending, repeat pending. Rest of labs wnl. Heparin gtt initiated and cardiology consulted. Echo ordered.

## 2023-10-30 ENCOUNTER — APPOINTMENT (OUTPATIENT)
Dept: CARDIOLOGY | Facility: HOSPITAL | Age: 73
DRG: 418 | End: 2023-10-30
Payer: MEDICARE

## 2023-10-30 LAB
ALBUMIN SERPL BCP-MCNC: 3.4 G/DL (ref 3.4–5)
ALP SERPL-CCNC: 50 U/L (ref 33–136)
ALT SERPL W P-5'-P-CCNC: 38 U/L (ref 10–52)
ANION GAP SERPL CALC-SCNC: 13 MMOL/L (ref 10–20)
AST SERPL W P-5'-P-CCNC: 27 U/L (ref 9–39)
BILIRUB DIRECT SERPL-MCNC: 0.3 MG/DL (ref 0–0.3)
BILIRUB SERPL-MCNC: 1.1 MG/DL (ref 0–1.2)
BUN SERPL-MCNC: 14 MG/DL (ref 6–23)
CALCIUM SERPL-MCNC: 8.3 MG/DL (ref 8.6–10.3)
CHLORIDE SERPL-SCNC: 100 MMOL/L (ref 98–107)
CO2 SERPL-SCNC: 25 MMOL/L (ref 21–32)
CREAT SERPL-MCNC: 0.99 MG/DL (ref 0.5–1.3)
ERYTHROCYTE [DISTWIDTH] IN BLOOD BY AUTOMATED COUNT: 12.8 % (ref 11.5–14.5)
GFR SERPL CREATININE-BSD FRML MDRD: 80 ML/MIN/1.73M*2
GLUCOSE SERPL-MCNC: 132 MG/DL (ref 74–99)
HCT VFR BLD AUTO: 37.5 % (ref 41–52)
HGB BLD-MCNC: 12.6 G/DL (ref 13.5–17.5)
MAGNESIUM SERPL-MCNC: 1.87 MG/DL (ref 1.6–2.4)
MCH RBC QN AUTO: 30.7 PG (ref 26–34)
MCHC RBC AUTO-ENTMCNC: 33.6 G/DL (ref 32–36)
MCV RBC AUTO: 92 FL (ref 80–100)
NRBC BLD-RTO: 0 /100 WBCS (ref 0–0)
PHOSPHATE SERPL-MCNC: 1.6 MG/DL (ref 2.5–4.9)
PLATELET # BLD AUTO: 183 X10*3/UL (ref 150–450)
PMV BLD AUTO: 10.4 FL (ref 7.5–11.5)
POTASSIUM SERPL-SCNC: 3.6 MMOL/L (ref 3.5–5.3)
PROT SERPL-MCNC: 6.1 G/DL (ref 6.4–8.2)
RBC # BLD AUTO: 4.1 X10*6/UL (ref 4.5–5.9)
SODIUM SERPL-SCNC: 134 MMOL/L (ref 136–145)
WBC # BLD AUTO: 7.6 X10*3/UL (ref 4.4–11.3)

## 2023-10-30 PROCEDURE — 96372 THER/PROPH/DIAG INJ SC/IM: CPT | Performed by: SURGERY

## 2023-10-30 PROCEDURE — 2500000001 HC RX 250 WO HCPCS SELF ADMINISTERED DRUGS (ALT 637 FOR MEDICARE OP): Performed by: PHYSICIAN ASSISTANT

## 2023-10-30 PROCEDURE — 99232 SBSQ HOSP IP/OBS MODERATE 35: CPT | Performed by: INTERNAL MEDICINE

## 2023-10-30 PROCEDURE — 2060000001 HC INTERMEDIATE ICU ROOM DAILY

## 2023-10-30 PROCEDURE — 2500000004 HC RX 250 GENERAL PHARMACY W/ HCPCS (ALT 636 FOR OP/ED): Performed by: PHYSICIAN ASSISTANT

## 2023-10-30 PROCEDURE — 2500000002 HC RX 250 W HCPCS SELF ADMINISTERED DRUGS (ALT 637 FOR MEDICARE OP, ALT 636 FOR OP/ED): Performed by: SURGERY

## 2023-10-30 PROCEDURE — 36415 COLL VENOUS BLD VENIPUNCTURE: CPT | Performed by: INTERNAL MEDICINE

## 2023-10-30 PROCEDURE — 94640 AIRWAY INHALATION TREATMENT: CPT

## 2023-10-30 PROCEDURE — 94660 CPAP INITIATION&MGMT: CPT

## 2023-10-30 PROCEDURE — 2500000004 HC RX 250 GENERAL PHARMACY W/ HCPCS (ALT 636 FOR OP/ED): Performed by: SURGERY

## 2023-10-30 PROCEDURE — 83735 ASSAY OF MAGNESIUM: CPT | Performed by: SURGERY

## 2023-10-30 PROCEDURE — 85027 COMPLETE CBC AUTOMATED: CPT | Performed by: INTERNAL MEDICINE

## 2023-10-30 PROCEDURE — C9113 INJ PANTOPRAZOLE SODIUM, VIA: HCPCS | Performed by: SURGERY

## 2023-10-30 PROCEDURE — 84100 ASSAY OF PHOSPHORUS: CPT | Performed by: SURGERY

## 2023-10-30 PROCEDURE — 82248 BILIRUBIN DIRECT: CPT | Performed by: SURGERY

## 2023-10-30 PROCEDURE — 36415 COLL VENOUS BLD VENIPUNCTURE: CPT | Performed by: SURGERY

## 2023-10-30 RX ORDER — MAGNESIUM SULFATE HEPTAHYDRATE 40 MG/ML
2 INJECTION, SOLUTION INTRAVENOUS ONCE
Status: COMPLETED | OUTPATIENT
Start: 2023-10-30 | End: 2023-10-30

## 2023-10-30 RX ADMIN — ALBUTEROL SULFATE 2.5 MG: 2.5 SOLUTION RESPIRATORY (INHALATION) at 11:52

## 2023-10-30 RX ADMIN — DILTIAZEM HCL 125 MG/125ML IN DEXTROSE 5% IV SOLN (1 MG/ML) 10 MG/HR: 125-5/125 SOLUTION at 06:00

## 2023-10-30 RX ADMIN — IPRATROPIUM BROMIDE AND ALBUTEROL SULFATE 3 ML: 2.5; .5 SOLUTION RESPIRATORY (INHALATION) at 08:16

## 2023-10-30 RX ADMIN — MAGNESIUM SULFATE HEPTAHYDRATE 2 G: 2 INJECTION, SOLUTION INTRAVENOUS at 08:56

## 2023-10-30 RX ADMIN — PANTOPRAZOLE SODIUM 40 MG: 40 INJECTION, POWDER, FOR SOLUTION INTRAVENOUS at 20:41

## 2023-10-30 RX ADMIN — Medication 500 MG: at 20:41

## 2023-10-30 RX ADMIN — PANTOPRAZOLE SODIUM 40 MG: 40 INJECTION, POWDER, FOR SOLUTION INTRAVENOUS at 08:57

## 2023-10-30 RX ADMIN — Medication 500 MG: at 08:57

## 2023-10-30 RX ADMIN — ENOXAPARIN SODIUM 40 MG: 40 INJECTION SUBCUTANEOUS at 08:57

## 2023-10-30 RX ADMIN — PIPERACILLIN SODIUM AND TAZOBACTAM SODIUM 3.38 G: 3; .375 INJECTION, SOLUTION INTRAVENOUS at 12:03

## 2023-10-30 RX ADMIN — PIPERACILLIN SODIUM AND TAZOBACTAM SODIUM 3.38 G: 3; .375 INJECTION, SOLUTION INTRAVENOUS at 05:58

## 2023-10-30 RX ADMIN — BUDESONIDE 0.5 MG: 0.5 INHALANT RESPIRATORY (INHALATION) at 08:15

## 2023-10-30 RX ADMIN — BUDESONIDE 0.5 MG: 0.5 INHALANT RESPIRATORY (INHALATION) at 19:49

## 2023-10-30 RX ADMIN — ACETAMINOPHEN 650 MG: 325 TABLET ORAL at 18:19

## 2023-10-30 RX ADMIN — TAMSULOSIN HYDROCHLORIDE 0.4 MG: 0.4 CAPSULE ORAL at 08:59

## 2023-10-30 RX ADMIN — SUCRALFATE ORAL 1 G: 1 SUSPENSION ORAL at 12:03

## 2023-10-30 RX ADMIN — PIPERACILLIN SODIUM AND TAZOBACTAM SODIUM 3.38 G: 3; .375 INJECTION, SOLUTION INTRAVENOUS at 18:19

## 2023-10-30 RX ADMIN — SUCRALFATE ORAL 1 G: 1 SUSPENSION ORAL at 05:58

## 2023-10-30 RX ADMIN — IPRATROPIUM BROMIDE AND ALBUTEROL SULFATE 3 ML: 2.5; .5 SOLUTION RESPIRATORY (INHALATION) at 19:50

## 2023-10-30 RX ADMIN — SUCRALFATE ORAL 1 G: 1 SUSPENSION ORAL at 18:19

## 2023-10-30 RX ADMIN — DILTIAZEM HCL 125 MG/125ML IN DEXTROSE 5% IV SOLN (1 MG/ML) 10 MG/HR: 125-5/125 SOLUTION at 18:19

## 2023-10-30 ASSESSMENT — COGNITIVE AND FUNCTIONAL STATUS - GENERAL
MOBILITY SCORE: 23
HELP NEEDED FOR BATHING: A LITTLE
DAILY ACTIVITIY SCORE: 22
DAILY ACTIVITIY SCORE: 22
CLIMB 3 TO 5 STEPS WITH RAILING: A LITTLE
DRESSING REGULAR UPPER BODY CLOTHING: A LITTLE
CLIMB 3 TO 5 STEPS WITH RAILING: A LITTLE
HELP NEEDED FOR BATHING: A LITTLE
DRESSING REGULAR UPPER BODY CLOTHING: A LITTLE
MOBILITY SCORE: 23

## 2023-10-30 ASSESSMENT — PAIN SCALES - GENERAL
PAINLEVEL_OUTOF10: 2
PAINLEVEL_OUTOF10: 0 - NO PAIN
PAINLEVEL_OUTOF10: 1

## 2023-10-30 ASSESSMENT — PAIN - FUNCTIONAL ASSESSMENT: PAIN_FUNCTIONAL_ASSESSMENT: 0-10

## 2023-10-30 NOTE — CARE PLAN
The patient's goals for the shift include      The clinical goals for the shift include pt. Will  be out of bed an walking TID    Pt. Met the goal

## 2023-10-30 NOTE — PROGRESS NOTES
"Subjective Data:  Patient reports feeling much better s/p cholecystectomy. Denies any chest pain, chest pressure, palpitations.     Overnight Events:    No acute events reported.      Objective Data:  Last Recorded Vitals:  Vitals:    10/29/23 1900 10/29/23 1945 10/29/23 2354 10/30/23 0609   BP: 106/66  109/67 109/71   BP Location: Right arm   Right arm   Patient Position: Lying   Lying   Pulse: (!) 121  (!) 127 99   Resp: 18   18   Temp: 36.7 °C (98.1 °F)   36.9 °C (98.4 °F)   TempSrc: Temporal   Tympanic   SpO2: 97% 95%  96%   Weight:       Height:           Last Labs:  CBC - 10/30/2023:  5:25 AM  7.6 12.6 183    37.5      CMP - 10/30/2023:  5:25 AM  8.3 6.1 27 --- 1.1   1.6 3.4 38 50      PTT - 10/28/2023:  9:06 PM  1.0   11.8 31     TROPHS   Date/Time Value Ref Range Status   10/29/2023 03:24  0 - 20 ng/L Final     Comment:     Previous result verified on 10/28/2023 2139 on specimen/case [id number] called with component [facility] for procedure Troponin I, High Sensitivity with value 485 ng/L.   10/28/2023 09:06  0 - 20 ng/L Final   10/27/2023 08:05 PM 7 0 - 20 ng/L Final     VLDL   Date/Time Value Ref Range Status   04/14/2021 08:33 AM 23 0 - 40 mg/dL Final   11/19/2019 09:04 AM 25 0 - 40 mg/dL Final   12/19/2017 10:56 AM 27 0 - 40 mg/dL Final      Last I/O:  I/O last 3 completed shifts:  In: 4318.9 (52.9 mL/kg) [I.V.:605.6 (7.4 mL/kg); IV Piggyback:3713.3]  Out: 1755 (21.5 mL/kg) [Urine:1530 (0.5 mL/kg/hr); Emesis/NG output:50; Drains:145; Blood:30]  Weight: 81.6 kg     Past Cardiology Tests (Last 3 Years):  EKG:  No results found for this or any previous visit from the past 1095 days.  Tele: Afib, 112 BPM persistently 100s.     Echo:  No results found for this or any previous visit from the past 1095 days.    Ejection Fractions:  No results found for: \"EF\"  Cath:  No results found for this or any previous visit from the past 1095 days.    Stress Test:  No results found for this or any previous " visit from the past 1095 days.    Cardiac Imaging:  No results found for this or any previous visit from the past 1095 days.      Inpatient Medications:  Scheduled medications   Medication Dose Route Frequency    acetaminophen  650 mg oral q6h    budesonide  0.5 mg nebulization BID    enoxaparin  40 mg subcutaneous Daily    ibuprofen  600 mg oral q6h RICH    ipratropium-albuteroL  3 mL nebulization TID    magnesium sulfate  2 g intravenous Once    metoclopramide  5 mg intravenous q6h RICH    ondansetron  8 mg intravenous Once    oxygen   inhalation Continuous - 02/gases    pantoprazole  40 mg intravenous BID    piperacillin-tazobactam  3.375 g intravenous q6h    polyethylene glycol  17 g oral Daily    potassium phosphate (monobasic)  500 mg oral BID    sucralfate  1 g nasogastric tube q6h RICH    tamsulosin  0.4 mg oral Daily     PRN medications   Medication    albuterol    albuterol    metoprolol    ondansetron    oxyCODONE    oxygen    promethazine (Phenergan) 6.25 mg in sodium chloride 0.9% 50 mL IV    traMADol     Continuous Medications   Medication Dose Last Rate    potassium ignpghk-V8-4.45%NaCl  100 mL/hr 100 mL/hr (10/29/23 2315)    dilTIAZem  5-15 mg/hr 10 mg/hr (10/30/23 0600)       Physical Exam:  Physical Exam  Constitutional:       Appearance: Normal appearance.   HENT:      Head: Normocephalic.      Nose: Nose normal.      Mouth/Throat:      Mouth: Mucous membranes are moist.   Eyes:      Conjunctiva/sclera: Conjunctivae normal.   Neck:      Comments: +JVD  Cardiovascular:      Rate and Rhythm: Rhythm irregularly irregular.      Heart sounds: No murmur heard.     No friction rub. No gallop.      Comments: Tele: Afib 112 bpm  Pulmonary:      Effort: Pulmonary effort is normal.      Breath sounds: Normal breath sounds.   Skin:     General: Skin is warm and dry.   Neurological:      General: No focal deficit present.      Mental Status: He is alert. Mental status is at baseline.   Psychiatric:         Mood  and Affect: Mood normal.            Assessment/Plan     Santy Jacob is a 74 yo male with PMH of HLD, Asthma, GERD presented to ED for abd pain noted to be in Afib w/ RVR. Patient s/p lap bertrand for gangrenous acute cholecystitis.     #Afib w/ RVR (new)   #HLD  #Acute gangrenous cholecystitis s/p lap bertrand    -On Heparin and Cardizem gtt preop.   -Remains in Afib, rate improved  -Hypervolemic on exam post operatively  -c/w Cardizem gtt, titrate up for rate control  -[id number]: 1  -NPO @ midnight will consider cardioversion if still in Afib  -Would recommend initiation of anticoagulation w/ Eliquis 5mg BID if will proceed with DCCV.   -Will reach out to surgical team to eval when patient is able to resume anticoagulation post operatively.   -Will follow     Peripheral IV 10/28/23 20 G Right;Dorsal Hand (Active)   Site Assessment Clean;Dry;Intact 10/30/23 0000   Dressing Status Clean;Dry 10/30/23 0000   Number of days: 2       Peripheral IV 10/28/23 Distal;Left;Ventral Forearm (Active)   Site Assessment Clean;Dry;Intact 10/30/23 0000   Dressing Status Clean;Dry 10/30/23 0000   Number of days: 2       Code Status:  Full Code    I spent  minutes in the professional and overall care of this patient.        Yvette Ramirez PA-C

## 2023-10-30 NOTE — PROGRESS NOTES
"General/Trauma Surgery Daily Progress Note    Subjective   Feels improved after surgery yesterday. Has surgical site soreness. Tolerated breakfast this morning. Motivated to ambulate. Voiding. Having flatus.       Objective   Last Recorded Vitals:  Blood pressure 118/70, pulse (!) 114, temperature 36.4 °C (97.5 °F), temperature source Temporal, resp. rate 18, height 1.702 m (5' 7\"), weight 81.6 kg (180 lb), SpO2 91 %.    Intake/Output last 3 Shifts:  I/O last 3 completed shifts:  In: 4318.9 (52.9 mL/kg) [I.V.:605.6 (7.4 mL/kg); IV Piggyback:3713.3]  Out: 1755 (21.5 mL/kg) [Urine:1530 (0.5 mL/kg/hr); Emesis/NG output:50; Drains:145; Blood:30]  Weight: 81.6 kg     Pain Score:  Pain Score: 0 - No pain     Physical Exam:  Constitutional: No acute distress, sitting up in bed.  Neuro: Alert, oriented. Follows commands.   Eyes: EOMI. No scleral icterus. Conjunctiva pink.  ENT: MMM.   Heart: A fib, rate controlled.  Respiratory: No increased work of breathing or audible wheeze.  Abdomen: Soft, nondistended. Appropriately tender to RUQ. Incisions c/d/I with glue. JUAN mildly purulent.  MSK: Moves all extremities.  Vascular: Palpable pulses throughout, no pitting edema.  Skin: No rashes.   Psychological: Appropriate mood and behavior.            Relevant Results  Laboratory Results:  CBC:   Lab Results   Component Value Date    WBC 7.6 10/30/2023    RBC 4.10 (L) 10/30/2023    HGB 12.6 (L) 10/30/2023    HCT 37.5 (L) 10/30/2023     10/30/2023       RFP:   Lab Results   Component Value Date     (L) 10/30/2023    K 3.6 10/30/2023     10/30/2023    CO2 25 10/30/2023    BUN 14 10/30/2023    CREATININE 0.99 10/30/2023    CALCIUM 8.3 (L) 10/30/2023    MG 1.87 10/30/2023    PHOS 1.6 (L) 10/30/2023        LFTs:   Lab Results   Component Value Date    PROT 6.1 (L) 10/30/2023    ALBUMIN 3.4 10/30/2023    BILITOT 1.1 10/30/2023    BILIDIR 0.3 10/30/2023    ALKPHOS 50 10/30/2023    AST 27 10/30/2023    ALT 38 10/30/2023    "           Assessment/Plan   This is a 73 y.o. male now POD 1 from laparoscopic cholecystectomy for a gangrenous gallbladder. Doing well. Also being treated for atrial fibrillation with RVR by our cardiology team.       Plan:   -- Recommend holding anticoagulation for at least one more day given amount of inflammation intra-operatively  -- Advance diet as tolerated  -- JUAN drain care  -- Multimodal pain regimen, IVF, anti-emetics  -- Continue IV antibiotic therapy while hospitalized, will need 4 days of PO Augmentin at discharge  -- Ambulate 5x per day in halls               Seen and discussed with Dr. Sandoval who is in agreement with plan. Please doc halo with questions.    Radha Garrido PA-C      I have seen and reviewed the patient with the PA.  I agree with their note as written above.  Any changes or corrections have been made in the body of the note.    Gerald Sandoval MD  General Surgery  Office: [phone]  Fax:     [phone]  5:50 PM   10/30/23

## 2023-10-31 ENCOUNTER — APPOINTMENT (OUTPATIENT)
Dept: CARDIOLOGY | Facility: HOSPITAL | Age: 73
DRG: 418 | End: 2023-10-31
Payer: MEDICARE

## 2023-10-31 ENCOUNTER — ANESTHESIA EVENT (OUTPATIENT)
Dept: CARDIOLOGY | Facility: HOSPITAL | Age: 73
DRG: 418 | End: 2023-10-31
Payer: MEDICARE

## 2023-10-31 ENCOUNTER — ANESTHESIA (OUTPATIENT)
Dept: CARDIOLOGY | Facility: HOSPITAL | Age: 73
DRG: 418 | End: 2023-10-31
Payer: MEDICARE

## 2023-10-31 ENCOUNTER — PHARMACY VISIT (OUTPATIENT)
Dept: PHARMACY | Facility: CLINIC | Age: 73
End: 2023-10-31
Payer: COMMERCIAL

## 2023-10-31 ENCOUNTER — HOSPITAL ENCOUNTER (OUTPATIENT)
Dept: CARDIOLOGY | Facility: HOSPITAL | Age: 73
Discharge: HOME | End: 2023-10-31
Payer: MEDICARE

## 2023-10-31 PROBLEM — I48.91 NEW ONSET A-FIB (MULTI): Status: ACTIVE | Noted: 2023-10-27

## 2023-10-31 LAB
ALBUMIN SERPL BCP-MCNC: 3.6 G/DL (ref 3.4–5)
ALP SERPL-CCNC: 72 U/L (ref 33–136)
ALT SERPL W P-5'-P-CCNC: 59 U/L (ref 10–52)
ANION GAP SERPL CALC-SCNC: 15 MMOL/L (ref 10–20)
AST SERPL W P-5'-P-CCNC: 42 U/L (ref 9–39)
BILIRUB DIRECT SERPL-MCNC: 0.1 MG/DL (ref 0–0.3)
BILIRUB SERPL-MCNC: 1 MG/DL (ref 0–1.2)
BUN SERPL-MCNC: 21 MG/DL (ref 6–23)
CALCIUM SERPL-MCNC: 8.6 MG/DL (ref 8.6–10.3)
CHLORIDE SERPL-SCNC: 101 MMOL/L (ref 98–107)
CO2 SERPL-SCNC: 25 MMOL/L (ref 21–32)
CREAT SERPL-MCNC: 1.06 MG/DL (ref 0.5–1.3)
EJECTION FRACTION APICAL 4 CHAMBER: 59.5
ERYTHROCYTE [DISTWIDTH] IN BLOOD BY AUTOMATED COUNT: 13.2 % (ref 11.5–14.5)
GFR SERPL CREATININE-BSD FRML MDRD: 74 ML/MIN/1.73M*2
GLUCOSE SERPL-MCNC: 115 MG/DL (ref 74–99)
HCT VFR BLD AUTO: 40.3 % (ref 41–52)
HGB BLD-MCNC: 13.1 G/DL (ref 13.5–17.5)
MAGNESIUM SERPL-MCNC: 2.22 MG/DL (ref 1.6–2.4)
MCH RBC QN AUTO: 30.3 PG (ref 26–34)
MCHC RBC AUTO-ENTMCNC: 32.5 G/DL (ref 32–36)
MCV RBC AUTO: 93 FL (ref 80–100)
NRBC BLD-RTO: 0 /100 WBCS (ref 0–0)
PHOSPHATE SERPL-MCNC: 1.9 MG/DL (ref 2.5–4.9)
PLATELET # BLD AUTO: 203 X10*3/UL (ref 150–450)
PMV BLD AUTO: 10.6 FL (ref 7.5–11.5)
POTASSIUM SERPL-SCNC: 3.6 MMOL/L (ref 3.5–5.3)
PROT SERPL-MCNC: 6.7 G/DL (ref 6.4–8.2)
RBC # BLD AUTO: 4.33 X10*6/UL (ref 4.5–5.9)
SODIUM SERPL-SCNC: 137 MMOL/L (ref 136–145)
WBC # BLD AUTO: 7.3 X10*3/UL (ref 4.4–11.3)

## 2023-10-31 PROCEDURE — 2500000002 HC RX 250 W HCPCS SELF ADMINISTERED DRUGS (ALT 637 FOR MEDICARE OP, ALT 636 FOR OP/ED): Performed by: SURGERY

## 2023-10-31 PROCEDURE — 93320 DOPPLER ECHO COMPLETE: CPT | Performed by: INTERNAL MEDICINE

## 2023-10-31 PROCEDURE — 2500000004 HC RX 250 GENERAL PHARMACY W/ HCPCS (ALT 636 FOR OP/ED): Performed by: SURGERY

## 2023-10-31 PROCEDURE — 92960 CARDIOVERSION ELECTRIC EXT: CPT | Performed by: INTERNAL MEDICINE

## 2023-10-31 PROCEDURE — 93312 ECHO TRANSESOPHAGEAL: CPT | Performed by: INTERNAL MEDICINE

## 2023-10-31 PROCEDURE — 93320 DOPPLER ECHO COMPLETE: CPT

## 2023-10-31 PROCEDURE — 96372 THER/PROPH/DIAG INJ SC/IM: CPT | Performed by: INTERNAL MEDICINE

## 2023-10-31 PROCEDURE — 84100 ASSAY OF PHOSPHORUS: CPT | Performed by: PHYSICIAN ASSISTANT

## 2023-10-31 PROCEDURE — 3700000001 HC GENERAL ANESTHESIA TIME - INITIAL BASE CHARGE: Performed by: INTERNAL MEDICINE

## 2023-10-31 PROCEDURE — C9113 INJ PANTOPRAZOLE SODIUM, VIA: HCPCS | Performed by: SURGERY

## 2023-10-31 PROCEDURE — 93010 ELECTROCARDIOGRAM REPORT: CPT | Performed by: INTERNAL MEDICINE

## 2023-10-31 PROCEDURE — 93005 ELECTROCARDIOGRAM TRACING: CPT

## 2023-10-31 PROCEDURE — 99232 SBSQ HOSP IP/OBS MODERATE 35: CPT | Performed by: NURSE PRACTITIONER

## 2023-10-31 PROCEDURE — 82248 BILIRUBIN DIRECT: CPT | Performed by: PHYSICIAN ASSISTANT

## 2023-10-31 PROCEDURE — 2500000004 HC RX 250 GENERAL PHARMACY W/ HCPCS (ALT 636 FOR OP/ED): Performed by: NURSE ANESTHETIST, CERTIFIED REGISTERED

## 2023-10-31 PROCEDURE — 2060000001 HC INTERMEDIATE ICU ROOM DAILY

## 2023-10-31 PROCEDURE — 94640 AIRWAY INHALATION TREATMENT: CPT

## 2023-10-31 PROCEDURE — 83735 ASSAY OF MAGNESIUM: CPT | Performed by: PHYSICIAN ASSISTANT

## 2023-10-31 PROCEDURE — 93306 TTE W/DOPPLER COMPLETE: CPT | Performed by: INTERNAL MEDICINE

## 2023-10-31 PROCEDURE — 36415 COLL VENOUS BLD VENIPUNCTURE: CPT | Performed by: PHYSICIAN ASSISTANT

## 2023-10-31 PROCEDURE — 96372 THER/PROPH/DIAG INJ SC/IM: CPT | Performed by: SURGERY

## 2023-10-31 PROCEDURE — A92960 PR CARDIOVERSION, ELECTIVE;EXTERN: Performed by: NURSE ANESTHETIST, CERTIFIED REGISTERED

## 2023-10-31 PROCEDURE — 93325 DOPPLER ECHO COLOR FLOW MAPG: CPT | Performed by: INTERNAL MEDICINE

## 2023-10-31 PROCEDURE — 2500000005 HC RX 250 GENERAL PHARMACY W/O HCPCS: Performed by: NURSE ANESTHETIST, CERTIFIED REGISTERED

## 2023-10-31 PROCEDURE — 99232 SBSQ HOSP IP/OBS MODERATE 35: CPT | Performed by: INTERNAL MEDICINE

## 2023-10-31 PROCEDURE — 93306 TTE W/DOPPLER COMPLETE: CPT

## 2023-10-31 PROCEDURE — 2500000001 HC RX 250 WO HCPCS SELF ADMINISTERED DRUGS (ALT 637 FOR MEDICARE OP): Performed by: PHYSICIAN ASSISTANT

## 2023-10-31 PROCEDURE — 2500000004 HC RX 250 GENERAL PHARMACY W/ HCPCS (ALT 636 FOR OP/ED): Performed by: INTERNAL MEDICINE

## 2023-10-31 PROCEDURE — 7100000009 HC PHASE TWO TIME - INITIAL BASE CHARGE: Performed by: INTERNAL MEDICINE

## 2023-10-31 PROCEDURE — 3700000002 HC GENERAL ANESTHESIA TIME - EACH INCREMENTAL 1 MINUTE: Performed by: INTERNAL MEDICINE

## 2023-10-31 PROCEDURE — 7100000010 HC PHASE TWO TIME - EACH INCREMENTAL 1 MINUTE: Performed by: INTERNAL MEDICINE

## 2023-10-31 PROCEDURE — 80053 COMPREHEN METABOLIC PANEL: CPT | Performed by: PHYSICIAN ASSISTANT

## 2023-10-31 PROCEDURE — 85027 COMPLETE CBC AUTOMATED: CPT | Performed by: PHYSICIAN ASSISTANT

## 2023-10-31 RX ORDER — PROPOFOL 10 MG/ML
INJECTION, EMULSION INTRAVENOUS AS NEEDED
Status: DISCONTINUED | OUTPATIENT
Start: 2023-10-31 | End: 2023-10-31

## 2023-10-31 RX ORDER — ENOXAPARIN SODIUM 100 MG/ML
1 INJECTION SUBCUTANEOUS ONCE
Status: COMPLETED | OUTPATIENT
Start: 2023-10-31 | End: 2023-10-31

## 2023-10-31 RX ORDER — FENTANYL CITRATE 50 UG/ML
INJECTION, SOLUTION INTRAMUSCULAR; INTRAVENOUS AS NEEDED
Status: DISCONTINUED | OUTPATIENT
Start: 2023-10-31 | End: 2023-10-31

## 2023-10-31 RX ORDER — PHENYLEPHRINE HYDROCHLORIDE 10 MG/ML
INJECTION INTRAVENOUS AS NEEDED
Status: DISCONTINUED | OUTPATIENT
Start: 2023-10-31 | End: 2023-10-31

## 2023-10-31 RX ORDER — FAMOTIDINE 10 MG/ML
INJECTION INTRAVENOUS AS NEEDED
Status: DISCONTINUED | OUTPATIENT
Start: 2023-10-31 | End: 2023-10-31

## 2023-10-31 RX ORDER — METOPROLOL TARTRATE 25 MG/1
12.5 TABLET, FILM COATED ORAL 2 TIMES DAILY
Status: DISCONTINUED | OUTPATIENT
Start: 2023-10-31 | End: 2023-11-01 | Stop reason: HOSPADM

## 2023-10-31 RX ORDER — MIDAZOLAM HYDROCHLORIDE 1 MG/ML
INJECTION INTRAMUSCULAR; INTRAVENOUS AS NEEDED
Status: DISCONTINUED | OUTPATIENT
Start: 2023-10-31 | End: 2023-10-31

## 2023-10-31 RX ORDER — DILTIAZEM HYDROCHLORIDE 240 MG/1
480 CAPSULE, COATED, EXTENDED RELEASE ORAL DAILY
Status: DISCONTINUED | OUTPATIENT
Start: 2023-10-31 | End: 2023-11-01 | Stop reason: HOSPADM

## 2023-10-31 RX ORDER — IPRATROPIUM BROMIDE AND ALBUTEROL SULFATE 2.5; .5 MG/3ML; MG/3ML
3 SOLUTION RESPIRATORY (INHALATION)
Status: DISCONTINUED | OUTPATIENT
Start: 2023-11-01 | End: 2023-11-01 | Stop reason: HOSPADM

## 2023-10-31 RX ADMIN — ACETAMINOPHEN 650 MG: 325 TABLET ORAL at 06:15

## 2023-10-31 RX ADMIN — METOCLOPRAMIDE 5 MG: 5 INJECTION, SOLUTION INTRAMUSCULAR; INTRAVENOUS at 11:44

## 2023-10-31 RX ADMIN — PROPOFOL 20 MG: 10 INJECTION, EMULSION INTRAVENOUS at 10:33

## 2023-10-31 RX ADMIN — METOPROLOL TARTRATE 12.5 MG: 25 TABLET, FILM COATED ORAL at 20:46

## 2023-10-31 RX ADMIN — SUCRALFATE ORAL 1 G: 1 SUSPENSION ORAL at 17:26

## 2023-10-31 RX ADMIN — PHENYLEPHRINE HYDROCHLORIDE 80 MCG: 10 INJECTION INTRAVENOUS at 10:25

## 2023-10-31 RX ADMIN — PROPOFOL 20 MG: 10 INJECTION, EMULSION INTRAVENOUS at 10:42

## 2023-10-31 RX ADMIN — ENOXAPARIN SODIUM 40 MG: 40 INJECTION SUBCUTANEOUS at 08:19

## 2023-10-31 RX ADMIN — ALBUTEROL SULFATE 2.5 MG: 2.5 SOLUTION RESPIRATORY (INHALATION) at 20:17

## 2023-10-31 RX ADMIN — BUDESONIDE 0.5 MG: 0.5 INHALANT RESPIRATORY (INHALATION) at 07:57

## 2023-10-31 RX ADMIN — METOCLOPRAMIDE 5 MG: 5 INJECTION, SOLUTION INTRAMUSCULAR; INTRAVENOUS at 10:15

## 2023-10-31 RX ADMIN — IPRATROPIUM BROMIDE AND ALBUTEROL SULFATE 3 ML: 2.5; .5 SOLUTION RESPIRATORY (INHALATION) at 07:58

## 2023-10-31 RX ADMIN — SUCRALFATE ORAL 1 G: 1 SUSPENSION ORAL at 06:15

## 2023-10-31 RX ADMIN — METOPROLOL TARTRATE 12.5 MG: 25 TABLET, FILM COATED ORAL at 13:53

## 2023-10-31 RX ADMIN — FENTANYL CITRATE 50 MCG: 50 INJECTION, SOLUTION INTRAMUSCULAR; INTRAVENOUS at 10:23

## 2023-10-31 RX ADMIN — ENOXAPARIN SODIUM 80 MG: 80 INJECTION SUBCUTANEOUS at 10:30

## 2023-10-31 RX ADMIN — PIPERACILLIN SODIUM AND TAZOBACTAM SODIUM 3.38 G: 3; .375 INJECTION, SOLUTION INTRAVENOUS at 11:44

## 2023-10-31 RX ADMIN — DILTIAZEM HCL 125 MG/125ML IN DEXTROSE 5% IV SOLN (1 MG/ML) 10 MG/HR: 125-5/125 SOLUTION at 03:28

## 2023-10-31 RX ADMIN — PANTOPRAZOLE SODIUM 40 MG: 40 INJECTION, POWDER, FOR SOLUTION INTRAVENOUS at 08:19

## 2023-10-31 RX ADMIN — PIPERACILLIN SODIUM AND TAZOBACTAM SODIUM 3.38 G: 3; .375 INJECTION, SOLUTION INTRAVENOUS at 17:26

## 2023-10-31 RX ADMIN — POLYETHYLENE GLYCOL 3350 17 G: 17 POWDER, FOR SOLUTION ORAL at 08:19

## 2023-10-31 RX ADMIN — PROPOFOL 20 MG: 10 INJECTION, EMULSION INTRAVENOUS at 10:52

## 2023-10-31 RX ADMIN — SUCRALFATE ORAL 1 G: 1 SUSPENSION ORAL at 11:44

## 2023-10-31 RX ADMIN — PIPERACILLIN SODIUM AND TAZOBACTAM SODIUM 3.38 G: 3; .375 INJECTION, SOLUTION INTRAVENOUS at 00:13

## 2023-10-31 RX ADMIN — ACETAMINOPHEN 650 MG: 325 TABLET ORAL at 17:26

## 2023-10-31 RX ADMIN — PROPOFOL 50 MG: 10 INJECTION, EMULSION INTRAVENOUS at 10:30

## 2023-10-31 RX ADMIN — PIPERACILLIN SODIUM AND TAZOBACTAM SODIUM 3.38 G: 3; .375 INJECTION, SOLUTION INTRAVENOUS at 06:15

## 2023-10-31 RX ADMIN — PHENYLEPHRINE HYDROCHLORIDE 80 MCG: 10 INJECTION INTRAVENOUS at 10:40

## 2023-10-31 RX ADMIN — TAMSULOSIN HYDROCHLORIDE 0.4 MG: 0.4 CAPSULE ORAL at 08:19

## 2023-10-31 RX ADMIN — FAMOTIDINE 10 MG: 10 INJECTION, SOLUTION INTRAVENOUS at 10:17

## 2023-10-31 RX ADMIN — PHENYLEPHRINE HYDROCHLORIDE 80 MCG: 10 INJECTION INTRAVENOUS at 10:52

## 2023-10-31 RX ADMIN — PROPOFOL 20 MG: 10 INJECTION, EMULSION INTRAVENOUS at 10:38

## 2023-10-31 RX ADMIN — DILTIAZEM HYDROCHLORIDE 480 MG: 240 CAPSULE, COATED, EXTENDED RELEASE ORAL at 13:53

## 2023-10-31 RX ADMIN — APIXABAN 5 MG: 5 TABLET, FILM COATED ORAL at 20:47

## 2023-10-31 RX ADMIN — ACETAMINOPHEN 650 MG: 325 TABLET ORAL at 00:13

## 2023-10-31 RX ADMIN — PANTOPRAZOLE SODIUM 40 MG: 40 INJECTION, POWDER, FOR SOLUTION INTRAVENOUS at 20:46

## 2023-10-31 RX ADMIN — SUCRALFATE ORAL 1 G: 1 SUSPENSION ORAL at 00:13

## 2023-10-31 RX ADMIN — PROPOFOL 20 MG: 10 INJECTION, EMULSION INTRAVENOUS at 10:44

## 2023-10-31 RX ADMIN — PROPOFOL 20 MG: 10 INJECTION, EMULSION INTRAVENOUS at 10:47

## 2023-10-31 RX ADMIN — METOCLOPRAMIDE 5 MG: 5 INJECTION, SOLUTION INTRAMUSCULAR; INTRAVENOUS at 17:26

## 2023-10-31 RX ADMIN — MIDAZOLAM HYDROCHLORIDE 1 MG: 1 INJECTION, SOLUTION INTRAMUSCULAR; INTRAVENOUS at 10:23

## 2023-10-31 RX ADMIN — BUDESONIDE 0.5 MG: 0.5 INHALANT RESPIRATORY (INHALATION) at 20:16

## 2023-10-31 RX ADMIN — ACETAMINOPHEN 650 MG: 325 TABLET ORAL at 11:44

## 2023-10-31 SDOH — HEALTH STABILITY: MENTAL HEALTH: CURRENT SMOKER: 0

## 2023-10-31 ASSESSMENT — COGNITIVE AND FUNCTIONAL STATUS - GENERAL
DAILY ACTIVITIY SCORE: 24
MOBILITY SCORE: 24

## 2023-10-31 ASSESSMENT — PAIN SCALES - GENERAL
PAINLEVEL_OUTOF10: 0 - NO PAIN
PAINLEVEL_OUTOF10: 2

## 2023-10-31 ASSESSMENT — PAIN - FUNCTIONAL ASSESSMENT
PAIN_FUNCTIONAL_ASSESSMENT: 0-10

## 2023-10-31 NOTE — ANESTHESIA POSTPROCEDURE EVALUATION
Patient: Santy Siddiqi    Procedure Summary       Date: 10/31/23 Room / Location: [facility] / [facility]    Anesthesia Start: 1006 Anesthesia Stop: 1112    Procedure: Cardioversion Diagnosis:       New onset a-fib (CMS/HCC)      (New onset a-fib (CMS/HCC) [I48.91])    Providers: Dillon Mathews MD Responsible Provider: MAKENZIE Knott    Anesthesia Type: MAC ASA Status: 3            Anesthesia Type: MAC    Vitals Value Taken Time   BP  10/31/23 1322   Temp  10/31/23 1322   Pulse  10/31/23 1322   Resp  10/31/23 1322   SpO2  10/31/23 1322       Anesthesia Post Evaluation    Patient location during evaluation: PACU  Patient participation: complete - patient participated  Level of consciousness: awake  Pain management: adequate  Multimodal analgesia pain management approach  Airway patency: patent  Two or more strategies used to mitigate risk of obstructive sleep apnea  Cardiovascular status: acceptable  Respiratory status: acceptable  Hydration status: acceptable        No notable events documented.

## 2023-10-31 NOTE — PROGRESS NOTES
"Subjective Data:  Pt resting comfortably in bed. Denies any chest pain, chest pressure, palpitations, dizziness, cough, shortness of breath, orthopnea, edema.      Overnight Events:    No acute events noted.      Objective Data:  Last Recorded Vitals:  Vitals:    10/31/23 1104 10/31/23 1109 10/31/23 1115 10/31/23 1137   BP: 95/58 91/64 101/66 96/58   BP Location:    Right arm   Patient Position:    Lying   Pulse: (!) 113 100 95 96   Resp: 16 23 20 16   Temp:    36.2 °C (97.2 °F)   TempSrc:    Temporal   SpO2: 96% 93% 95% 94%   Weight:       Height:           Last Labs:  CBC - 10/31/2023:  8:54 AM  7.3 13.1 203    40.3      CMP - 10/31/2023:  8:54 AM  8.6 6.7 42 --- 1.0   1.9 3.6 59 72      PTT - 10/28/2023:  9:06 PM  1.0   11.8 31     TROPHS   Date/Time Value Ref Range Status   10/29/2023 03:24  0 - 20 ng/L Final     Comment:     Previous result verified on 10/28/2023 2139 on specimen/case [id number] called with component [facility] for procedure Troponin I, High Sensitivity with value 485 ng/L.   10/28/2023 09:06  0 - 20 ng/L Final   10/27/2023 08:05 PM 7 0 - 20 ng/L Final     VLDL   Date/Time Value Ref Range Status   04/14/2021 08:33 AM 23 0 - 40 mg/dL Final   11/19/2019 09:04 AM 25 0 - 40 mg/dL Final   12/19/2017 10:56 AM 27 0 - 40 mg/dL Final      Last I/O:  I/O last 3 completed shifts:  In: 1815.9 (22.2 mL/kg) [P.O.:240; I.V.:485.9 (6 mL/kg); IV Piggyback:1090]  Out: 1835 (22.5 mL/kg) [Urine:1650 (0.6 mL/kg/hr); Drains:185]  Weight: 81.6 kg     Past Cardiology Tests (Last 3 Years):  EKG:  No results found for this or any previous visit from the past 1095 days.    Echo:  No results found for this or any previous visit from the past 1095 days.    Ejection Fractions:  No results found for: \"EF\"  Cath:  No results found for this or any previous visit from the past 1095 days.    Stress Test:  No results found for this or any previous visit from the past 1095 days.    Cardiac Imaging:  No results found " for this or any previous visit from the past 1095 days.      Inpatient Medications:  Scheduled medications   Medication Dose Route Frequency    acetaminophen  650 mg oral q6h    budesonide  0.5 mg nebulization BID    ibuprofen  600 mg oral q6h RICH    ipratropium-albuteroL  3 mL nebulization TID    metoclopramide  5 mg intravenous q6h RICH    pantoprazole  40 mg intravenous BID    piperacillin-tazobactam  3.375 g intravenous q6h    polyethylene glycol  17 g oral Daily    sucralfate  1 g nasogastric tube q6h RICH    tamsulosin  0.4 mg oral Daily     PRN medications   Medication    albuterol    metoprolol    ondansetron    oxyCODONE    oxygen    traMADol     Continuous Medications   Medication Dose Last Rate    dilTIAZem  5-15 mg/hr 10 mg/hr (10/31/23 1148)       Physical Exam:  Physical Exam  Constitutional:       Appearance: Normal appearance.   HENT:      Head: Normocephalic.      Nose: Nose normal.      Mouth/Throat:      Mouth: Mucous membranes are moist.   Eyes:      Conjunctiva/sclera: Conjunctivae normal.   Neck:      Comments: No JVD  Cardiovascular:      Rate and Rhythm: Tachycardia present. Rhythm irregularly irregular.      Heart sounds: No murmur heard.     No gallop.      Comments: Tele: Afib 115  Pulmonary:      Breath sounds: Normal breath sounds.   Abdominal:      Palpations: Abdomen is soft.   Musculoskeletal:         General: No swelling. Normal range of motion.   Skin:     General: Skin is warm.   Neurological:      General: No focal deficit present.      Mental Status: He is alert. Mental status is at baseline.   Psychiatric:         Mood and Affect: Mood normal.         Behavior: Behavior normal.            Assessment/Plan     Santy Jacob is a 72 yo male with PMH of HLD, Asthma, GERD presented to ED for abd pain noted to be in Afib w/ RVR. Patient s/p lap bertrand for gangrenous acute cholecystitis.      #Afib w/ RVR s/p DCCV did not remain in NSR  #HLD  #Acute gangrenous cholecystitis s/p lap  bertrand     -Discontinue Heparin and Cardizem gtt  -Start Eliquis 5mg BID, Cardizem , Metoprolol 12.5mg BID  -Remains in Afib, rate improved  -Hypervolemic on exam post operatively  -[id number]: 1  -DCCV today w/o success  -Will follow       Peripheral IV 10/28/23 20 G Right;Dorsal Hand (Active)   Site Assessment Clean;Dry;Intact 10/31/23 0744   Dressing Type Transparent 10/31/23 0744   Line Status Flushed 10/31/23 0744   Dressing Status Clean;Dry;Occlusive 10/31/23 0744   Number of days: 3       Peripheral IV 10/28/23 Distal;Left;Ventral Forearm (Active)   Site Assessment Clean;Dry;Intact 10/31/23 0743   Dressing Type Transparent 10/31/23 0743   Line Status Flushed 10/31/23 0743   Dressing Status Clean;Dry;Occlusive 10/31/23 0743   Number of days: 3       Code Status:  Full Code    I spent  minutes in the professional and overall care of this patient.        Yvette Ramirez PA-C

## 2023-10-31 NOTE — ANESTHESIA PREPROCEDURE EVALUATION
Patient: Santy Siddiqi    Procedure Information       Date/Time: 10/31/23 1307    Procedure: Cardioversion    Location: [facility] / [facility]    Providers: Dillon Mathews MD     Vitals:    10/31/23 0900   BP:    Pulse:    Resp: 16   Temp:    SpO2:        Past Surgical History:   Procedure Laterality Date    COLONOSCOPY  11/07/2017    Colonoscopy    HERNIA REPAIR      NOSE SURGERY  11/07/2017    Nose Surgery     Past Medical History:   Diagnosis Date    Inadequate sleep hygiene     History of difficulty sleeping    Overactive bladder     Overactive bladder    Personal history of colonic polyps     History of colonic polyps    Personal history of other diseases of male genital organs     History of benign prostatic hyperplasia    Personal history of other diseases of the digestive system     History of diverticulitis    Personal history of other diseases of the digestive system     History of hemorrhoids    Personal history of other diseases of the musculoskeletal system and connective tissue     History of degenerative disc disease    Personal history of other specified conditions     History of fatigue       Current Facility-Administered Medications:     acetaminophen (Tylenol) tablet 650 mg, 650 mg, oral, q6h, Wade Sandoval MD, 650 mg at 10/31/23 0615    albuterol 2.5 mg /3 mL (0.083 %) nebulizer solution 2.5 mg, 2.5 mg, nebulization, q2h PRN, Wade Sandoval MD, 2.5 mg at 10/30/23 1152    budesonide (Pulmicort) 0.5 mg/2 mL nebulizer solution 0.5 mg, 0.5 mg, nebulization, BID, Wade Sandoval MD, 0.5 mg at 10/31/23 0757    dilTIAZem (Cardizem) 125 mg in dextrose 5% 125 mL (1 mg/mL) infusion (premix), 5-15 mg/hr, intravenous, Continuous, Wade Sandoval MD, Last Rate: 10 mL/hr at 10/31/23 0839, 10 mg/hr at 10/31/23 0839    enoxaparin (Lovenox) syringe 40 mg, 40 mg, subcutaneous, Daily, Wade Sandoval MD, 40 mg at 10/31/23 0819    ibuprofen tablet 600 mg, 600 mg,  oral, q6h RICH, Wade Sandoval MD, 600 mg at 10/29/23 1740    ipratropium-albuteroL (Duo-Neb) 0.5-2.5 mg/3 mL nebulizer solution 3 mL, 3 mL, nebulization, TID, Wade Sandoval MD, 3 mL at 10/31/23 0758    metoclopramide (Reglan) injection 5 mg, 5 mg, intravenous, q6h RICH, Wade Sandoval MD, 5 mg at 10/29/23 2348    metoprolol tartrate (Lopressor) injection 5 mg, 5 mg, intravenous, q15 min PRN, Wade Sandoval MD, 5 mg at 10/28/23 2116    [DISCONTINUED] ondansetron (Zofran) tablet 4 mg, 4 mg, oral, q8h PRN **OR** ondansetron (Zofran) injection 4 mg, 4 mg, intravenous, q6h PRN, Wade Sandoval MD, 4 mg at 10/29/23 1446    oxyCODONE (Roxicodone) immediate release tablet 5 mg, 5 mg, oral, q4h PRN, Wade Sandoval MD    oxygen (O2) therapy, , inhalation, Continuous PRN - O2/gases, Wade Sandoval MD, 2 L/min at 10/29/23 1945    [DISCONTINUED] pantoprazole (ProtoNix) EC tablet 40 mg, 40 mg, oral, Daily before breakfast **OR** pantoprazole (ProtoNix) injection 40 mg, 40 mg, intravenous, BID, Wade Sandoval MD, 40 mg at 10/31/23 0819    piperacillin-tazobactam-dextrose (Zosyn) IV 3.375 g, 3.375 g, intravenous, q6h, Wade Sandoval MD, Stopped at 10/31/23 0645    polyethylene glycol (Glycolax, Miralax) packet 17 g, 17 g, oral, Daily, Wade Sandoval MD, 17 g at 10/31/23 0819    sucralfate (Carafate) suspension 1 g, 1 g, nasogastric tube, q6h RICH, Wade Sandoval MD, 1 g at 10/31/23 0615    tamsulosin (Flomax) 24 hr capsule 0.4 mg, 0.4 mg, oral, Daily, Wade Sandoval MD, 0.4 mg at 10/31/23 0819    traMADol (Ultram) tablet 50 mg, 50 mg, oral, q4h PRN, Wade Sandoval MD  Prior to Admission medications    Medication Sig Start Date End Date Taking? Authorizing Provider   albuterol 90 mcg/actuation inhaler Inhale 2 puffs every 6 hours if needed for wheezing.    Historical Provider, MD   clobetasol (Temovate) 0.05 % cream Apply sparingly to affected area(s) twice  daily for up to one week at a time 9/20/19   Historical Provider, MD   ipratropium (Atrovent) 21 mcg (0.03 %) nasal spray use 2 sprays in each nostril in the morning and 2 sprays in the evening and 2 sprays before bedtime 7/31/23   Gage Hobbs MD   omeprazole (PriLOSEC) 40 mg DR capsule Take 1 capsule (40 mg) by mouth once daily. 9/12/23   Gage Hobbs MD   Symbicort 160-4.5 mcg/actuation inhaler INHALE TWO PUFFS BY MOUTH TWO TIMES A DAY rinse mouth after use 7/31/23   Gage Hobbs MD   tamsulosin (Flomax) 0.4 mg 24 hr capsule Take 1 capsule (0.4 mg) by mouth once daily. 9/12/23   Gage Hobbs MD     No Known Allergies  Social History     Tobacco Use    Smoking status: Former     Types: Cigarettes    Smokeless tobacco: Never   Substance Use Topics    Alcohol use: Not Currently     Comment: glass of wine 2-3 times per year         Chemistry    Lab Results   Component Value Date/Time     10/31/2023 0854    K 3.6 10/31/2023 0854     10/31/2023 0854    CO2 25 10/31/2023 0854    BUN 21 10/31/2023 0854    CREATININE 1.06 10/31/2023 0854    Lab Results   Component Value Date/Time    CALCIUM 8.6 10/31/2023 0854    ALKPHOS 72 10/31/2023 0854    AST 42 (H) 10/31/2023 0854    ALT 59 (H) 10/31/2023 0854    BILITOT 1.0 10/31/2023 0854          Lab Results   Component Value Date/Time    WBC 7.3 10/31/2023 0854    HGB 13.1 (L) 10/31/2023 0854    HCT 40.3 (L) 10/31/2023 0854     10/31/2023 0854     Lab Results   Component Value Date/Time    PROTIME 11.8 10/28/2023 2106    INR 1.0 10/28/2023 2106     No results found for this or any previous visit (from the past 4464 hour(s)).  No results found for this or any previous visit from the past 1095 days.      Vitals:    10/31/23 0900   BP:    Pulse:    Resp: 16   Temp:    SpO2:        Past Surgical History:   Procedure Laterality Date    COLONOSCOPY  11/07/2017    Colonoscopy    HERNIA REPAIR      NOSE SURGERY  11/07/2017    Nose Surgery     Past  Medical History:   Diagnosis Date    Inadequate sleep hygiene     History of difficulty sleeping    Overactive bladder     Overactive bladder    Personal history of colonic polyps     History of colonic polyps    Personal history of other diseases of male genital organs     History of benign prostatic hyperplasia    Personal history of other diseases of the digestive system     History of diverticulitis    Personal history of other diseases of the digestive system     History of hemorrhoids    Personal history of other diseases of the musculoskeletal system and connective tissue     History of degenerative disc disease    Personal history of other specified conditions     History of fatigue       Current Facility-Administered Medications:     acetaminophen (Tylenol) tablet 650 mg, 650 mg, oral, q6h, Wade Sandoval MD, 650 mg at 10/31/23 0615    albuterol 2.5 mg /3 mL (0.083 %) nebulizer solution 2.5 mg, 2.5 mg, nebulization, q2h PRN, Wade Sandoval MD, 2.5 mg at 10/30/23 1152    budesonide (Pulmicort) 0.5 mg/2 mL nebulizer solution 0.5 mg, 0.5 mg, nebulization, BID, Wade Sandoval MD, 0.5 mg at 10/31/23 0757    dilTIAZem (Cardizem) 125 mg in dextrose 5% 125 mL (1 mg/mL) infusion (premix), 5-15 mg/hr, intravenous, Continuous, Wade Sandoval MD, Last Rate: 10 mL/hr at 10/31/23 0839, 10 mg/hr at 10/31/23 0839    enoxaparin (Lovenox) syringe 40 mg, 40 mg, subcutaneous, Daily, Wade Sandoval MD, 40 mg at 10/31/23 0819    ibuprofen tablet 600 mg, 600 mg, oral, q6h RICH, Wade Sandoval MD, 600 mg at 10/29/23 1740    ipratropium-albuteroL (Duo-Neb) 0.5-2.5 mg/3 mL nebulizer solution 3 mL, 3 mL, nebulization, TID, Wade Sandoval MD, 3 mL at 10/31/23 0758    metoclopramide (Reglan) injection 5 mg, 5 mg, intravenous, q6h RICH, Wade Sandoval MD, 5 mg at 10/29/23 2348    metoprolol tartrate (Lopressor) injection 5 mg, 5 mg, intravenous, q15 min PRN, Wade Sandoval MD, 5 mg at  10/28/23 2116    [DISCONTINUED] ondansetron (Zofran) tablet 4 mg, 4 mg, oral, q8h PRN **OR** ondansetron (Zofran) injection 4 mg, 4 mg, intravenous, q6h PRN, Wade Sandoval MD, 4 mg at 10/29/23 1446    oxyCODONE (Roxicodone) immediate release tablet 5 mg, 5 mg, oral, q4h PRN, Wade Sandoval MD    oxygen (O2) therapy, , inhalation, Continuous PRN - O2/gases, Wade Sandoval MD, 2 L/min at 10/29/23 1945    [DISCONTINUED] pantoprazole (ProtoNix) EC tablet 40 mg, 40 mg, oral, Daily before breakfast **OR** pantoprazole (ProtoNix) injection 40 mg, 40 mg, intravenous, BID, Wade Sandoval MD, 40 mg at 10/31/23 0819    piperacillin-tazobactam-dextrose (Zosyn) IV 3.375 g, 3.375 g, intravenous, q6h, Wade Sandoval MD, Stopped at 10/31/23 0645    polyethylene glycol (Glycolax, Miralax) packet 17 g, 17 g, oral, Daily, Wade Sandoval MD, 17 g at 10/31/23 0819    sucralfate (Carafate) suspension 1 g, 1 g, nasogastric tube, q6h RICH, Wade Sandoval MD, 1 g at 10/31/23 0615    tamsulosin (Flomax) 24 hr capsule 0.4 mg, 0.4 mg, oral, Daily, Wade Sandoval MD, 0.4 mg at 10/31/23 0819    traMADol (Ultram) tablet 50 mg, 50 mg, oral, q4h PRN, Wade Sandoval MD  Prior to Admission medications    Medication Sig Start Date End Date Taking? Authorizing Provider   albuterol 90 mcg/actuation inhaler Inhale 2 puffs every 6 hours if needed for wheezing.    Historical Provider, MD   clobetasol (Temovate) 0.05 % cream Apply sparingly to affected area(s) twice daily for up to one week at a time 9/20/19   Historical Provider, MD   ipratropium (Atrovent) 21 mcg (0.03 %) nasal spray use 2 sprays in each nostril in the morning and 2 sprays in the evening and 2 sprays before bedtime 7/31/23   Gage Hobbs MD   omeprazole (PriLOSEC) 40 mg DR capsule Take 1 capsule (40 mg) by mouth once daily. 9/12/23   Gage Hobbs MD   Symbicort 160-4.5 mcg/actuation inhaler INHALE TWO PUFFS BY MOUTH TWO TIMES A  DAY rinse mouth after use 7/31/23   Gage Hobbs MD   tamsulosin (Flomax) 0.4 mg 24 hr capsule Take 1 capsule (0.4 mg) by mouth once daily. 9/12/23   Gage Hobbs MD     No Known Allergies  Social History     Tobacco Use    Smoking status: Former     Types: Cigarettes    Smokeless tobacco: Never   Substance Use Topics    Alcohol use: Not Currently     Comment: glass of wine 2-3 times per year         Chemistry    Lab Results   Component Value Date/Time     10/31/2023 0854    K 3.6 10/31/2023 0854     10/31/2023 0854    CO2 25 10/31/2023 0854    BUN 21 10/31/2023 0854    CREATININE 1.06 10/31/2023 0854    Lab Results   Component Value Date/Time    CALCIUM 8.6 10/31/2023 0854    ALKPHOS 72 10/31/2023 0854    AST 42 (H) 10/31/2023 0854    ALT 59 (H) 10/31/2023 0854    BILITOT 1.0 10/31/2023 0854          Lab Results   Component Value Date/Time    WBC 7.3 10/31/2023 0854    HGB 13.1 (L) 10/31/2023 0854    HCT 40.3 (L) 10/31/2023 0854     10/31/2023 0854     Lab Results   Component Value Date/Time    PROTIME 11.8 10/28/2023 2106    INR 1.0 10/28/2023 2106     No results found for this or any previous visit (from the past 4464 hour(s)).  No results found for this or any previous visit from the past 1095 days.      Relevant Problems   Cardiovascular   (+) Combined hyperlipidemia   (+) New onset a-fib (CMS/HCC)      GI   (+) GERD without esophagitis      Pulmonary   (+) Chronic bronchitis (CMS/HCC)   (+) Moderate persistent asthma without complication       Clinical information reviewed:   Tobacco  Allergies  Meds  Problems  Med Hx  Surg Hx   Fam Hx  Soc   Hx        NPO Detail:  No data recorded     Physical Exam    Airway  Mallampati: II  TM distance: >3 FB  Neck ROM: full     Cardiovascular   Rhythm: irregular     Dental    Pulmonary - normal exam     Abdominal - normal exam  Abdomen: soft  Bowel sounds: normal           Anesthesia Plan    ASA 3     MAC     The patient is not a current  smoker.  Patient was not previously instructed to abstain from smoking on day of procedure.  Patient did not smoke on day of procedure.  Education provided regarding risk of obstructive sleep apnea.  intravenous induction   Anesthetic plan and risks discussed with patient.    Plan discussed with CRNA.

## 2023-10-31 NOTE — CARE PLAN
The patient's goals for the shift include      The clinical goals for the shift include Pt. will ambulate TID

## 2023-10-31 NOTE — INTERVAL H&P NOTE
H&P reviewed. The patient was examined and there are no changes to the H&P.    NICOLE/DCCV for rapid, persistent atrial fibrillation.

## 2023-10-31 NOTE — PROGRESS NOTES
Santy Siddiqi is a 73 y.o. male on day 4 of admission presenting with gangrenous gallbladder, s/p lap bertrand.     Subjective   Seen and examined in his room this AM. Reports mild abdominal discomfort. Passing gas. No BM. He denies chest pain, breathing difficulties, abdominal pain, N/V/D/C, fever, or chills.  No palpitations. Planned DCCV today.      Objective     Last Recorded Vitals  /75 (BP Location: Right arm, Patient Position: Lying)   Pulse (!) 112   Temp 36.8 °C (98.2 °F) (Temporal)   Resp 18   Wt 81.6 kg (180 lb)   SpO2 92%   Intake/Output last 3 Shifts:    Intake/Output Summary (Last 24 hours) at 10/31/2023 1500  Last data filed at 10/31/2023 1428  Gross per 24 hour   Intake 979 ml   Output 675 ml   Net 304 ml         Admission Weight  Weight: 81.6 kg (180 lb) (10/27/23 1849)    Daily Weight  10/28/23 : 81.6 kg (180 lb)    Image Results  CT A/P:  1. Small bowel loops in the right lower quadrant are minimally air and  fluid distended: cannot exclude early and/or developing small bowel  obstruction. The stomach is also moderately distended with air and  fluid. Close follow-up advised.  2. Gallbladder is distended with suggestion of small stones. No  gallbladder wall thickening or biliary duct dilatation.      Physical Exam  Constitutional: A&O x 3; NAD; calm and cooperative  Eyes: EOM's intact  HEENT: Normocephalic, Atraumatic. Oral mucosa moist.   Neck: Supple. No JVD, lymphadenopathy.   Lungs: CTAB with fair air movement. Respirations even and unlabored on room air.   Heart: Irregular, tachycardic  Abdomen: Softly distended with tenderness, +BS. Lap sites are well-approximated SARAH; RLQ JUAN drain with bloody drainage.   MS/Extremities: LOVE equally x 4. No edema. Peripheral pulses intact bilaterally.   Neuro: A&O x3; no focal deficits; gross motor and sensation intact.   Skin: Warm and dry. No rashes or lesions  Psych: Normal affect.      Relevant Results  Scheduled medications  acetaminophen, 650  mg, oral, q6h  apixaban, 5 mg, oral, q12h  budesonide, 0.5 mg, nebulization, BID  dilTIAZem CD, 480 mg, oral, Daily  ibuprofen, 600 mg, oral, q6h RICH  ipratropium-albuteroL, 3 mL, nebulization, TID  metoclopramide, 5 mg, intravenous, q6h RICH  metoprolol tartrate, 12.5 mg, oral, BID  pantoprazole, 40 mg, intravenous, BID  piperacillin-tazobactam, 3.375 g, intravenous, q6h  polyethylene glycol, 17 g, oral, Daily  sucralfate, 1 g, nasogastric tube, q6h RICH  tamsulosin, 0.4 mg, oral, Daily      Continuous medications       PRN medications  PRN medications: albuterol, metoprolol, [DISCONTINUED] ondansetron **OR** ondansetron, oxyCODONE, oxygen, traMADol     Results for orders placed or performed during the hospital encounter of 10/27/23 (from the past 24 hour(s))   CBC   Result Value Ref Range    WBC 7.3 4.4 - 11.3 x10*3/uL    nRBC 0.0 0.0 - 0.0 /100 WBCs    RBC 4.33 (L) 4.50 - 5.90 x10*6/uL    Hemoglobin 13.1 (L) 13.5 - 17.5 g/dL    Hematocrit 40.3 (L) 41.0 - 52.0 %    MCV 93 80 - 100 fL    MCH 30.3 26.0 - 34.0 pg    MCHC 32.5 32.0 - 36.0 g/dL    RDW 13.2 11.5 - 14.5 %    Platelets 203 150 - 450 x10*3/uL    MPV 10.6 7.5 - 11.5 fL   Hepatic function panel   Result Value Ref Range    Albumin 3.6 3.4 - 5.0 g/dL    Bilirubin, Total 1.0 0.0 - 1.2 mg/dL    Bilirubin, Direct 0.1 0.0 - 0.3 mg/dL    Alkaline Phosphatase 72 33 - 136 U/L    ALT 59 (H) 10 - 52 U/L    AST 42 (H) 9 - 39 U/L    Total Protein 6.7 6.4 - 8.2 g/dL   Magnesium   Result Value Ref Range    Magnesium 2.22 1.60 - 2.40 mg/dL   Phosphorus   Result Value Ref Range    Phosphorus 1.9 (L) 2.5 - 4.9 mg/dL   Basic Metabolic Panel   Result Value Ref Range    Glucose 115 (H) 74 - 99 mg/dL    Sodium 137 136 - 145 mmol/L    Potassium 3.6 3.5 - 5.3 mmol/L    Chloride 101 98 - 107 mmol/L    Bicarbonate 25 21 - 32 mmol/L    Anion Gap 15 10 - 20 mmol/L    Urea Nitrogen 21 6 - 23 mg/dL    Creatinine 1.06 0.50 - 1.30 mg/dL    eGFR 74 >60 mL/min/1.73m*2    Calcium 8.6 8.6 - 10.3  mg/dL   Transesophageal Echo (NICOLE)   Result Value Ref Range    BSA 1.96 m2   Transthoracic Echo (TTE) Complete   Result Value Ref Range    BSA 1.96 m2       Assessment/Plan   74 y/o M w/PMH of asthma, GERD, BPH presenting w/abdominal pain, n/v; CT imaging concerning for early SBO.     Gangrenous Gallbladder  -s/p lap bertrand on 10/29 by Dr. Sandoval.   -Incisional care, drain care, antibiotics, diet advancement per surgery  -On IVF, antiemetics  -Normal LFT's, Bilirubin  -Pain regimen: Oxycodone, Tramadol  -Discussed with surgery PAMARTA. Plan is to remove JUAN drain in AM.   -Will need 4 more days of Augmentin with discharge.   -Afebrile. No leukocytosis.     Atrial Fibrillation with RVR - New onset  -Cardiology consulted and placed on diltiazem gtt  -Today 2nd DCCV in effort to achieve NSR - not successful  -Medical therapy: Diltiazem, Metoprolol  -Will need Apixaban and cleared by surgery to start  -Price check for cost to patient - $47 per month    Asthma  -No objective/subjective signs of acute exacerbation at this time  -On Budesonide, Duonebs    GERD  -On PPI    DVT Prophylaxis  -Apixaban    Fluids/Electrolytes/Nutrition  -Laboratory data reviewed.   -Electrolytes stable.   -No nutritional concerns at this time.      Disposition  -Plan of care discussed with attending, Dr. Dickey, and General Surgery.  -Home with spouse when medically improved.     SARAI Canales-CNP

## 2023-10-31 NOTE — CARE PLAN
The patient's goals for the shift include      The clinical goals for the shift include pt will ambulate TID

## 2023-10-31 NOTE — PROGRESS NOTES
"General/Trauma Surgery Daily Progress Note    Subjective   Doing well, pain still controlled. JUAN more serosanguinous. No fevers or chills. Tolerating diet. Remains in a-fib despite cardioversion today.        Objective   Last Recorded Vitals:  Blood pressure 118/75, pulse (!) 112, temperature 36.8 °C (98.2 °F), temperature source Temporal, resp. rate 18, height 1.702 m (5' 7\"), weight 81.6 kg (180 lb), SpO2 92 %.    Intake/Output last 3 Shifts:  I/O last 3 completed shifts:  In: 1815.9 (22.2 mL/kg) [P.O.:240; I.V.:485.9 (6 mL/kg); IV Piggyback:1090]  Out: 1835 (22.5 mL/kg) [Urine:1650 (0.6 mL/kg/hr); Drains:185]  Weight: 81.6 kg     Pain Score:  Pain Score: 2     Physical Exam:  Constitutional: No acute distress, sitting up in bed.  Neuro: Alert, oriented. Follows commands.   Eyes: EOMI. No scleral icterus. Conjunctiva pink.  ENT: MMM.   Heart: A fib, rate controlled.  Respiratory: No increased work of breathing or audible wheeze.  Abdomen: Soft, nondistended. Appropriately tender to RUQ. Incisions c/d/I with glue. JUAN serosanguinous.  MSK: Moves all extremities.  Vascular: Palpable pulses throughout, no pitting edema.  Skin: No rashes.   Psychological: Appropriate mood and behavior.            Relevant Results  Laboratory Results:  CBC:   Lab Results   Component Value Date    WBC 7.3 10/31/2023    RBC 4.33 (L) 10/31/2023    HGB 13.1 (L) 10/31/2023    HCT 40.3 (L) 10/31/2023     10/31/2023       RFP:   Lab Results   Component Value Date     10/31/2023    K 3.6 10/31/2023     10/31/2023    CO2 25 10/31/2023    BUN 21 10/31/2023    CREATININE 1.06 10/31/2023    CALCIUM 8.6 10/31/2023    MG 2.22 10/31/2023    PHOS 1.9 (L) 10/31/2023        LFTs:   Lab Results   Component Value Date    PROT 6.7 10/31/2023    ALBUMIN 3.6 10/31/2023    BILITOT 1.0 10/31/2023    BILIDIR 0.1 10/31/2023    ALKPHOS 72 10/31/2023    AST 42 (H) 10/31/2023    ALT 59 (H) 10/31/2023              Assessment/Plan   This is a 73 " y.o. male now POD 2 from laparoscopic cholecystectomy for a gangrenous gallbladder. Doing well. Also being treated for atrial fibrillation with RVR by our cardiology team, cardioverted today without conversion to normal sinus. Remains rate controlled.      Plan:   -- OK to start Eliquis from surgical standpoint  -- Trend CBC while hospitalized   -- Advance diet as tolerated  -- JUAN drain care - Will plan to remove tomorrow  -- Multimodal pain regimen, IVF, anti-emetics  -- Continue IV antibiotic therapy while hospitalized, will need 4 days of PO Augmentin at discharge  -- Ambulate 5x per day in halls               Seen and discussed with Dr. Sandoval who is in agreement with plan. Please doc halo with questions.    Radha Garrido PA-C    I have seen and reviewed the patient with the PA.  I agree with their note as written above.  Any changes or corrections have been made in the body of the note.    Gerald Sandoval MD  General Surgery  Office: [phone]  Fax:     [phone]  4:11 PM   10/31/23

## 2023-11-01 ENCOUNTER — PHARMACY VISIT (OUTPATIENT)
Dept: PHARMACY | Facility: CLINIC | Age: 73
End: 2023-11-01
Payer: MEDICARE

## 2023-11-01 VITALS
RESPIRATION RATE: 18 BRPM | DIASTOLIC BLOOD PRESSURE: 63 MMHG | TEMPERATURE: 97.3 F | WEIGHT: 180 LBS | HEIGHT: 67 IN | HEART RATE: 90 BPM | OXYGEN SATURATION: 93 % | SYSTOLIC BLOOD PRESSURE: 99 MMHG | BODY MASS INDEX: 28.25 KG/M2

## 2023-11-01 PROBLEM — J20.9 ACUTE BRONCHITIS: Status: RESOLVED | Noted: 2022-12-16 | Resolved: 2023-11-01

## 2023-11-01 PROBLEM — K56.609 INTESTINAL OBSTRUCTION, UNSPECIFIED CAUSE, UNSPECIFIED WHETHER PARTIAL OR COMPLETE (MULTI): Status: RESOLVED | Noted: 2023-10-27 | Resolved: 2023-11-01

## 2023-11-01 PROBLEM — K81.9 CHOLECYSTITIS: Status: RESOLVED | Noted: 2023-10-27 | Resolved: 2023-11-01

## 2023-11-01 PROBLEM — R06.2 WHEEZING: Status: RESOLVED | Noted: 2022-12-16 | Resolved: 2023-11-01

## 2023-11-01 LAB
ALBUMIN SERPL BCP-MCNC: 3.2 G/DL (ref 3.4–5)
ALP SERPL-CCNC: 80 U/L (ref 33–136)
ALT SERPL W P-5'-P-CCNC: 95 U/L (ref 10–52)
ANION GAP SERPL CALC-SCNC: 12 MMOL/L (ref 10–20)
AST SERPL W P-5'-P-CCNC: 74 U/L (ref 9–39)
BILIRUB SERPL-MCNC: 1 MG/DL (ref 0–1.2)
BUN SERPL-MCNC: 21 MG/DL (ref 6–23)
CALCIUM SERPL-MCNC: 8.1 MG/DL (ref 8.6–10.3)
CHLORIDE SERPL-SCNC: 104 MMOL/L (ref 98–107)
CO2 SERPL-SCNC: 27 MMOL/L (ref 21–32)
CREAT SERPL-MCNC: 1.04 MG/DL (ref 0.5–1.3)
ERYTHROCYTE [DISTWIDTH] IN BLOOD BY AUTOMATED COUNT: 13.1 % (ref 11.5–14.5)
GFR SERPL CREATININE-BSD FRML MDRD: 76 ML/MIN/1.73M*2
GLUCOSE SERPL-MCNC: 96 MG/DL (ref 74–99)
HCT VFR BLD AUTO: 36.1 % (ref 41–52)
HGB BLD-MCNC: 11.7 G/DL (ref 13.5–17.5)
MCH RBC QN AUTO: 29.8 PG (ref 26–34)
MCHC RBC AUTO-ENTMCNC: 32.4 G/DL (ref 32–36)
MCV RBC AUTO: 92 FL (ref 80–100)
NRBC BLD-RTO: 0 /100 WBCS (ref 0–0)
PLATELET # BLD AUTO: 204 X10*3/UL (ref 150–450)
PMV BLD AUTO: 10.4 FL (ref 7.5–11.5)
POTASSIUM SERPL-SCNC: 3.5 MMOL/L (ref 3.5–5.3)
PROT SERPL-MCNC: 6 G/DL (ref 6.4–8.2)
RBC # BLD AUTO: 3.92 X10*6/UL (ref 4.5–5.9)
SODIUM SERPL-SCNC: 139 MMOL/L (ref 136–145)
WBC # BLD AUTO: 5.1 X10*3/UL (ref 4.4–11.3)

## 2023-11-01 PROCEDURE — 85027 COMPLETE CBC AUTOMATED: CPT | Performed by: NURSE PRACTITIONER

## 2023-11-01 PROCEDURE — C9113 INJ PANTOPRAZOLE SODIUM, VIA: HCPCS | Performed by: SURGERY

## 2023-11-01 PROCEDURE — 2500000004 HC RX 250 GENERAL PHARMACY W/ HCPCS (ALT 636 FOR OP/ED): Performed by: SURGERY

## 2023-11-01 PROCEDURE — 99232 SBSQ HOSP IP/OBS MODERATE 35: CPT | Performed by: PHYSICIAN ASSISTANT

## 2023-11-01 PROCEDURE — 2500000001 HC RX 250 WO HCPCS SELF ADMINISTERED DRUGS (ALT 637 FOR MEDICARE OP): Performed by: SURGERY

## 2023-11-01 PROCEDURE — 2500000002 HC RX 250 W HCPCS SELF ADMINISTERED DRUGS (ALT 637 FOR MEDICARE OP, ALT 636 FOR OP/ED): Performed by: SURGERY

## 2023-11-01 PROCEDURE — RXMED WILLOW AMBULATORY MEDICATION CHARGE

## 2023-11-01 PROCEDURE — 36415 COLL VENOUS BLD VENIPUNCTURE: CPT | Performed by: NURSE PRACTITIONER

## 2023-11-01 PROCEDURE — 2500000001 HC RX 250 WO HCPCS SELF ADMINISTERED DRUGS (ALT 637 FOR MEDICARE OP): Performed by: NURSE PRACTITIONER

## 2023-11-01 PROCEDURE — 99239 HOSP IP/OBS DSCHRG MGMT >30: CPT | Performed by: NURSE PRACTITIONER

## 2023-11-01 PROCEDURE — 2500000002 HC RX 250 W HCPCS SELF ADMINISTERED DRUGS (ALT 637 FOR MEDICARE OP, ALT 636 FOR OP/ED): Performed by: INTERNAL MEDICINE

## 2023-11-01 PROCEDURE — 94640 AIRWAY INHALATION TREATMENT: CPT

## 2023-11-01 PROCEDURE — 99232 SBSQ HOSP IP/OBS MODERATE 35: CPT | Performed by: INTERNAL MEDICINE

## 2023-11-01 PROCEDURE — 80053 COMPREHEN METABOLIC PANEL: CPT | Performed by: NURSE PRACTITIONER

## 2023-11-01 PROCEDURE — 2500000001 HC RX 250 WO HCPCS SELF ADMINISTERED DRUGS (ALT 637 FOR MEDICARE OP): Performed by: PHYSICIAN ASSISTANT

## 2023-11-01 RX ORDER — ACETAMINOPHEN 325 MG/1
650 TABLET ORAL EVERY 6 HOURS PRN
Start: 2023-11-01 | End: 2023-12-01 | Stop reason: ALTCHOICE

## 2023-11-01 RX ORDER — AMOXICILLIN AND CLAVULANATE POTASSIUM 875; 125 MG/1; MG/1
1 TABLET, FILM COATED ORAL 2 TIMES DAILY
Qty: 8 TABLET | Refills: 0 | Status: SHIPPED | OUTPATIENT
Start: 2023-11-01 | End: 2023-11-05

## 2023-11-01 RX ORDER — METOPROLOL TARTRATE 25 MG/1
12.5 TABLET, FILM COATED ORAL 2 TIMES DAILY
Qty: 30 TABLET | Refills: 0 | Status: SHIPPED | OUTPATIENT
Start: 2023-11-01 | End: 2023-11-01 | Stop reason: SDUPTHER

## 2023-11-01 RX ORDER — IBUPROFEN 600 MG/1
600 TABLET ORAL EVERY 6 HOURS PRN
Start: 2023-11-01 | End: 2023-12-01

## 2023-11-01 RX ORDER — DILTIAZEM HYDROCHLORIDE 240 MG/1
480 CAPSULE, EXTENDED RELEASE ORAL DAILY
Qty: 60 CAPSULE | Refills: 0 | Status: SHIPPED | OUTPATIENT
Start: 2023-11-02 | End: 2023-11-27 | Stop reason: SDUPTHER

## 2023-11-01 RX ORDER — TRAMADOL HYDROCHLORIDE 50 MG/1
50 TABLET ORAL EVERY 8 HOURS PRN
Qty: 10 TABLET | Refills: 0 | Status: SHIPPED | OUTPATIENT
Start: 2023-11-01 | End: 2023-11-01 | Stop reason: HOSPADM

## 2023-11-01 RX ORDER — AMOXICILLIN AND CLAVULANATE POTASSIUM 875; 125 MG/1; MG/1
1 TABLET, FILM COATED ORAL 2 TIMES DAILY
Qty: 8 TABLET | Refills: 0 | Status: SHIPPED | OUTPATIENT
Start: 2023-11-01 | End: 2023-11-01 | Stop reason: SDUPTHER

## 2023-11-01 RX ORDER — DILTIAZEM HYDROCHLORIDE 240 MG/1
480 CAPSULE, EXTENDED RELEASE ORAL DAILY
Qty: 60 CAPSULE | Refills: 0 | Status: SHIPPED | OUTPATIENT
Start: 2023-11-02 | End: 2023-11-01 | Stop reason: SDUPTHER

## 2023-11-01 RX ORDER — METOPROLOL TARTRATE 25 MG/1
12.5 TABLET, FILM COATED ORAL 2 TIMES DAILY
Qty: 30 TABLET | Refills: 0 | Status: SHIPPED | OUTPATIENT
Start: 2023-11-01 | End: 2023-11-27 | Stop reason: SDUPTHER

## 2023-11-01 RX ADMIN — PIPERACILLIN SODIUM AND TAZOBACTAM SODIUM 3.38 G: 3; .375 INJECTION, SOLUTION INTRAVENOUS at 00:03

## 2023-11-01 RX ADMIN — METOPROLOL TARTRATE 12.5 MG: 25 TABLET, FILM COATED ORAL at 08:28

## 2023-11-01 RX ADMIN — METOCLOPRAMIDE 5 MG: 5 INJECTION, SOLUTION INTRAMUSCULAR; INTRAVENOUS at 11:53

## 2023-11-01 RX ADMIN — ACETAMINOPHEN 650 MG: 325 TABLET ORAL at 06:16

## 2023-11-01 RX ADMIN — BENZOCAINE AND MENTHOL 1 LOZENGE: 15; 3.6 LOZENGE ORAL at 12:45

## 2023-11-01 RX ADMIN — IPRATROPIUM BROMIDE AND ALBUTEROL SULFATE 3 ML: 2.5; .5 SOLUTION RESPIRATORY (INHALATION) at 08:31

## 2023-11-01 RX ADMIN — DILTIAZEM HYDROCHLORIDE 480 MG: 240 CAPSULE, COATED, EXTENDED RELEASE ORAL at 08:29

## 2023-11-01 RX ADMIN — SUCRALFATE ORAL 1 G: 1 SUSPENSION ORAL at 11:53

## 2023-11-01 RX ADMIN — IBUPROFEN 600 MG: 200 TABLET, FILM COATED ORAL at 11:53

## 2023-11-01 RX ADMIN — PIPERACILLIN SODIUM AND TAZOBACTAM SODIUM 3.38 G: 3; .375 INJECTION, SOLUTION INTRAVENOUS at 11:54

## 2023-11-01 RX ADMIN — PIPERACILLIN SODIUM AND TAZOBACTAM SODIUM 3.38 G: 3; .375 INJECTION, SOLUTION INTRAVENOUS at 06:18

## 2023-11-01 RX ADMIN — PANTOPRAZOLE SODIUM 40 MG: 40 INJECTION, POWDER, FOR SOLUTION INTRAVENOUS at 08:29

## 2023-11-01 RX ADMIN — BUDESONIDE 0.5 MG: 0.5 INHALANT RESPIRATORY (INHALATION) at 08:30

## 2023-11-01 RX ADMIN — APIXABAN 5 MG: 5 TABLET, FILM COATED ORAL at 08:28

## 2023-11-01 RX ADMIN — TAMSULOSIN HYDROCHLORIDE 0.4 MG: 0.4 CAPSULE ORAL at 08:29

## 2023-11-01 ASSESSMENT — COGNITIVE AND FUNCTIONAL STATUS - GENERAL
DAILY ACTIVITIY SCORE: 24
MOBILITY SCORE: 24

## 2023-11-01 ASSESSMENT — PAIN SCALES - GENERAL
PAINLEVEL_OUTOF10: 1
PAINLEVEL_OUTOF10: 0 - NO PAIN

## 2023-11-01 NOTE — PROGRESS NOTES
11/01/23 1050   Discharge Planning   Living Arrangements Spouse/significant other   Support Systems Spouse/significant other   Assistance Needed X3, independent in ADLs, drives   Type of Residence Private residence   Home or Post Acute Services None   Patient expects to be discharged to: Home with spouse, no needs.     11-1-23 @ 1052: Apixiban cost already checked by PA and $47/month.

## 2023-11-01 NOTE — CARE PLAN
The patient's goals for the shift include  free from falls    The clinical goals for the shift include pt will ambulate TID    Over the shift, the patient did not make progress toward the following goals. Barriers to progression include patient participating.  Recommendations to address these barriers include encouragement.

## 2023-11-01 NOTE — DISCHARGE SUMMARY
Discharge Diagnosis  Intestinal obstruction, unspecified cause, unspecified whether partial or complete (CMS/HCC)    Issues Requiring Follow-Up  F/U with Cardiology and General Surgery    Discharge Meds     Your medication list        ASK your doctor about these medications        Instructions Last Dose Given Next Dose Due   albuterol 90 mcg/actuation inhaler           clobetasol 0.05 % cream  Commonly known as: Temovate           ipratropium 21 mcg (0.03 %) nasal spray  Commonly known as: Atrovent      use 2 sprays in each nostril in the morning and 2 sprays in the evening and 2 sprays before bedtime       omeprazole 40 mg DR capsule  Commonly known as: PriLOSEC      Take 1 capsule (40 mg) by mouth once daily.       Symbicort 160-4.5 mcg/actuation inhaler  Generic drug: budesonide-formoteroL      INHALE TWO PUFFS BY MOUTH TWO TIMES A DAY rinse mouth after use       tamsulosin 0.4 mg 24 hr capsule  Commonly known as: Flomax      Take 1 capsule (0.4 mg) by mouth once daily.                Test Results Pending At Discharge  Pending Labs       Order Current Status    Surgical Pathology Exam In process            Hospital Course   72 y/o M w/PMH of asthma, GERD, BPH presenting w/abdominal pain, n/v; CT imaging concerning for early SBO. General surgery consulted. Further workup revealed gangrenous gallbladder. Underwent lap bertrand on 10/29 by Dr. Sandoval. Postop surgical course uneventful. Pain adequately controlled. On antibiotics. Diet advanced as tolerated. Drain removed on date of discharge. + Flatus/BM. Cleared for discharge by surgeon with recommendations to F/U in 2 weeks. Mild elevation in LFT's today. Repeat CMP in one week. Rx given for Augmentin x 4 days of therapy to complete course. Postoperatively, he did have a new onset of A.Fib with RVR. Cardiology consulted. Placed on Diltiazem gtt. Underwent DCCV x 2 and was unable to achieve NSR. Rate improved on Diltiazem and Metoprolol. Started on Apixaban for CVA  prevention. Hemodynamically stable for discharge to home. No evidence of acute asthma exacerbation. On PPI for GERD. Electrolytes monitored and replaced as needed.      Disposition  Plan of care discussed with attending, Dr. Costa, Cardiology [facility] and General Surgery [facility]. Seen and examined in his room today. Awake and alert. Expressing readiness for discharge to home. He denies chest pain, breathing difficulties, N/V/D/C, fever, or chills.  Medications reviewed and reconciled. Scripts prepared as needed. Greater than 35 minutes spent in coordination of care, including physical examination, chart review, medication reconciliation, collaboration with providers, staff, and family.    Pertinent Physical Exam At Time of Discharge  Physical Exam  Constitutional: A&O x 3; NAD; calm and cooperative  Eyes: EOM's intact  HEENT: Normocephalic, Atraumatic. Oral mucosa moist.   Neck: Supple. No JVD, lymphadenopathy.   Lungs: CTAB with fair air movement. Respirations even and unlabored on room air.   Heart: Irregular, tachycardic  Abdomen: Softly distended with tenderness, +BS. Lap sites are well-approximated SARAH; RLQ JUAN drain with bloody drainage.   MS/Extremities: LOVE equally x 4. No edema. Peripheral pulses intact bilaterally.   Neuro: A&O x3; no focal deficits; gross motor and sensation intact.   Skin: Warm and dry. No rashes or lesions  Psych: Normal affect.      Results for orders placed or performed during the hospital encounter of 10/27/23 (from the past 24 hour(s))   CBC   Result Value Ref Range    WBC 5.1 4.4 - 11.3 x10*3/uL    nRBC 0.0 0.0 - 0.0 /100 WBCs    RBC 3.92 (L) 4.50 - 5.90 x10*6/uL    Hemoglobin 11.7 (L) 13.5 - 17.5 g/dL    Hematocrit 36.1 (L) 41.0 - 52.0 %    MCV 92 80 - 100 fL    MCH 29.8 26.0 - 34.0 pg    MCHC 32.4 32.0 - 36.0 g/dL    RDW 13.1 11.5 - 14.5 %    Platelets 204 150 - 450 x10*3/uL    MPV 10.4 7.5 - 11.5 fL   Comprehensive metabolic panel   Result Value Ref Range    Glucose 96 74 - 99 mg/dL     Sodium 139 136 - 145 mmol/L    Potassium 3.5 3.5 - 5.3 mmol/L    Chloride 104 98 - 107 mmol/L    Bicarbonate 27 21 - 32 mmol/L    Anion Gap 12 10 - 20 mmol/L    Urea Nitrogen 21 6 - 23 mg/dL    Creatinine 1.04 0.50 - 1.30 mg/dL    eGFR 76 >60 mL/min/1.73m*2    Calcium 8.1 (L) 8.6 - 10.3 mg/dL    Albumin 3.2 (L) 3.4 - 5.0 g/dL    Alkaline Phosphatase 80 33 - 136 U/L    Total Protein 6.0 (L) 6.4 - 8.2 g/dL    AST 74 (H) 9 - 39 U/L    Bilirubin, Total 1.0 0.0 - 1.2 mg/dL    ALT 95 (H) 10 - 52 U/L        Outpatient Follow-Up  Future Appointments   Date Time Provider Department [place]   11/27/2023  2:30 PM [facility]   1/5/2024 10:45 AM Antione Shane MD [id number]         Nola Reyna, APRN-CNP

## 2023-11-01 NOTE — PROGRESS NOTES
"Santy Siddiqi is a 73 y.o. male on day 5 of admission presenting with Intestinal obstruction, unspecified cause, unspecified whether partial or complete (CMS/HCC).    Subjective   Denies any chest pain, chest pressure, palpitations, dizziness, cough, shortness of breath, orthopnea, edema.         Objective     Physical Exam  Constitutional:       Appearance: Normal appearance.   HENT:      Head: Normocephalic.      Nose: Nose normal.      Mouth/Throat:      Mouth: Mucous membranes are moist.   Eyes:      Conjunctiva/sclera: Conjunctivae normal.   Neck:      Comments: NO JVD  Cardiovascular:      Rate and Rhythm: Rhythm irregularly irregular.      Heart sounds: No murmur heard.     No friction rub. No gallop.      Comments: Tele: Afib rate controlled   Pulmonary:      Effort: Pulmonary effort is normal.      Breath sounds: Normal breath sounds.   Musculoskeletal:         General: No swelling. Normal range of motion.      Cervical back: Normal range of motion.   Skin:     General: Skin is warm and dry.   Neurological:      General: No focal deficit present.      Mental Status: He is oriented to person, place, and time. Mental status is at baseline.   Psychiatric:         Mood and Affect: Mood normal.         Behavior: Behavior normal.         Last Recorded Vitals  Blood pressure 99/63, pulse 90, temperature 36.3 °C (97.3 °F), temperature source Temporal, resp. rate 18, height 1.702 m (5' 7\"), weight 81.6 kg (180 lb), SpO2 93 %.  Intake/Output last 3 Shifts:  I/O last 3 completed shifts:  In: 1171.8 (14.4 mL/kg) [P.O.:960; I.V.:61.8 (0.8 mL/kg); IV Piggyback:150]  Out: 75 (0.9 mL/kg) [Drains:75]  Weight: 81.6 kg     Relevant Results                Scheduled medications    Continuous medications    PRN medications          Assessment/Plan   Active Problems:    New onset a-fib (CMS/HCC)    Santy Jacob is a 72 yo male with PMH of HLD, Asthma, GERD presented to ED for abd pain noted to be in Afib w/ RVR. Patient s/p " lap bertrand for gangrenous acute cholecystitis.      #Afib w/ RVR s/p DCCV did not remain in NSR, rate controlled   #HLD  #Acute gangrenous cholecystitis s/p lap bertrand     -Discontinue Heparin and Cardizem gtt  -c/w  Eliquis 5mg BID, Cardizem , Metoprolol 12.5mg BID  -Remains in Afib, rate improved  -Hypervolemic on exam post operatively, now euvolemic   -[id number]-Aclm: 1  -DCCV w/o success  -OK to discharge per CV standpoint, sign off.          I spent  minutes in the professional and overall care of this patient.      Yvette Ramirez PA-C

## 2023-11-01 NOTE — CARE PLAN
The patient's goals for the shift include  discharge to home    The clinical goals for the shift include pt will ambulate TID    Over the shift, the patient did not make progress toward the following goals. Barriers to progression include participation in care. Recommendations to address these barriers include encouragement and education.

## 2023-11-02 ENCOUNTER — PATIENT OUTREACH (OUTPATIENT)
Dept: PRIMARY CARE | Facility: CLINIC | Age: 73
End: 2023-11-02
Payer: MEDICARE

## 2023-11-02 NOTE — PROGRESS NOTES
Pt declining TCM services at this time. Pt states he is recovering well and will make an appt with PCP when he gets around to it. Pt does not have any questions, concerns, or issues with his medications at this time.

## 2023-11-05 LAB
ATRIAL RATE: 159 BPM
Q ONSET: 229 MS
QRS COUNT: 25 BEATS
QRS DURATION: 82 MS
QT INTERVAL: 306 MS
QTC CALCULATION(BAZETT): 480 MS
QTC FREDERICIA: 413 MS
R AXIS: -42 DEGREES
T AXIS: 50 DEGREES
T OFFSET: 382 MS
VENTRICULAR RATE: 148 BPM

## 2023-11-06 DIAGNOSIS — J45.40 MODERATE PERSISTENT ASTHMA WITHOUT COMPLICATION (HHS-HCC): ICD-10-CM

## 2023-11-06 RX ORDER — BUDESONIDE AND FORMOTEROL FUMARATE DIHYDRATE 160; 4.5 UG/1; UG/1
2 AEROSOL RESPIRATORY (INHALATION) 2 TIMES DAILY
Qty: 30.6 G | Refills: 1 | Status: SHIPPED | OUTPATIENT
Start: 2023-11-06

## 2023-11-06 NOTE — TELEPHONE ENCOUNTER
Pts spouse Alaina states he needs ASAP     Rx Refill Request Telephone Encounter    Name:  Santy Siddiqi  :  [id number]  Medication Name:  Symbicort   2 PUFFS BID WITH REFILLS   Specific Pharmacy location:  [facility] [place]   Date of last appointment:  10/2023  Date of next appointment:  N/A  Best number to reach patient:  [phone]

## 2023-11-07 ENCOUNTER — LAB (OUTPATIENT)
Dept: LAB | Facility: LAB | Age: 73
End: 2023-11-07
Payer: MEDICARE

## 2023-11-07 DIAGNOSIS — K81.9 CHOLECYSTITIS: ICD-10-CM

## 2023-11-07 LAB
ALBUMIN SERPL BCP-MCNC: 3.7 G/DL (ref 3.4–5)
ALP SERPL-CCNC: 119 U/L (ref 33–136)
ALT SERPL W P-5'-P-CCNC: 65 U/L (ref 10–52)
ANION GAP SERPL CALC-SCNC: 12 MMOL/L (ref 10–20)
AST SERPL W P-5'-P-CCNC: 20 U/L (ref 9–39)
BILIRUB SERPL-MCNC: 0.4 MG/DL (ref 0–1.2)
BUN SERPL-MCNC: 23 MG/DL (ref 6–23)
CALCIUM SERPL-MCNC: 9.1 MG/DL (ref 8.6–10.3)
CHLORIDE SERPL-SCNC: 102 MMOL/L (ref 98–107)
CO2 SERPL-SCNC: 29 MMOL/L (ref 21–32)
CREAT SERPL-MCNC: 1.13 MG/DL (ref 0.5–1.3)
ERYTHROCYTE [DISTWIDTH] IN BLOOD BY AUTOMATED COUNT: 13.4 % (ref 11.5–14.5)
GFR SERPL CREATININE-BSD FRML MDRD: 69 ML/MIN/1.73M*2
GLUCOSE SERPL-MCNC: 92 MG/DL (ref 74–99)
HCT VFR BLD AUTO: 41.3 % (ref 41–52)
HGB BLD-MCNC: 12.9 G/DL (ref 13.5–17.5)
MCH RBC QN AUTO: 30.1 PG (ref 26–34)
MCHC RBC AUTO-ENTMCNC: 31.2 G/DL (ref 32–36)
MCV RBC AUTO: 97 FL (ref 80–100)
NRBC BLD-RTO: 0 /100 WBCS (ref 0–0)
PLATELET # BLD AUTO: 421 X10*3/UL (ref 150–450)
POTASSIUM SERPL-SCNC: 5.4 MMOL/L (ref 3.5–5.3)
PROT SERPL-MCNC: 6.5 G/DL (ref 6.4–8.2)
RBC # BLD AUTO: 4.28 X10*6/UL (ref 4.5–5.9)
SODIUM SERPL-SCNC: 138 MMOL/L (ref 136–145)
WBC # BLD AUTO: 5 X10*3/UL (ref 4.4–11.3)

## 2023-11-07 PROCEDURE — 80053 COMPREHEN METABOLIC PANEL: CPT

## 2023-11-07 PROCEDURE — 85027 COMPLETE CBC AUTOMATED: CPT

## 2023-11-07 PROCEDURE — 36415 COLL VENOUS BLD VENIPUNCTURE: CPT

## 2023-11-09 LAB
LABORATORY COMMENT REPORT: NORMAL
PATH REPORT.FINAL DX SPEC: NORMAL
PATH REPORT.GROSS SPEC: NORMAL
PATH REPORT.RELEVANT HX SPEC: NORMAL
PATH REPORT.TOTAL CANCER: NORMAL

## 2023-11-13 DIAGNOSIS — J45.40 MODERATE PERSISTENT ASTHMA WITHOUT COMPLICATION (HHS-HCC): Primary | ICD-10-CM

## 2023-11-27 ENCOUNTER — APPOINTMENT (OUTPATIENT)
Dept: CARDIOLOGY | Facility: HOSPITAL | Age: 73
End: 2023-11-27
Payer: MEDICARE

## 2023-11-27 DIAGNOSIS — I48.91 NEW ONSET A-FIB (MULTI): ICD-10-CM

## 2023-11-27 RX ORDER — METOPROLOL TARTRATE 25 MG/1
12.5 TABLET, FILM COATED ORAL 2 TIMES DAILY
Qty: 90 TABLET | Refills: 0 | Status: SHIPPED | OUTPATIENT
Start: 2023-11-27 | End: 2023-12-11 | Stop reason: ALTCHOICE

## 2023-11-27 RX ORDER — DILTIAZEM HYDROCHLORIDE 240 MG/1
480 CAPSULE, EXTENDED RELEASE ORAL DAILY
Qty: 180 CAPSULE | Refills: 0 | Status: SHIPPED | OUTPATIENT
Start: 2023-11-27 | End: 2024-04-12 | Stop reason: ALTCHOICE

## 2023-11-27 NOTE — TELEPHONE ENCOUNTER
Rx Refill Request Telephone Encounter    Name:  Santy Siddiqi  :  [id number]  Medication Name:    dilTIAZem XR (Dilacor XR) 240 mg 24 hr capsule Take 2 capsules (480 mg) by mouth once daily. - 90 day    apixaban (Eliquis) 5 mg tablet Take 1 tablet (5 mg) by mouth every 12 hours. - 90 day    metoprolol tartrate (Lopressor) 25 mg tablet Take 0.5 tablets (12.5 mg) by mouth 2 times a day. - 90 day    Specific Pharmacy location:  [facility]  Date of last appointment:  10/12/2023  Date of next appointment:  2023  Best number to reach patient:  [phone]

## 2023-12-04 ENCOUNTER — OFFICE VISIT (OUTPATIENT)
Dept: PRIMARY CARE | Facility: CLINIC | Age: 73
End: 2023-12-04
Payer: COMMERCIAL

## 2023-12-04 VITALS
DIASTOLIC BLOOD PRESSURE: 62 MMHG | BODY MASS INDEX: 29.29 KG/M2 | WEIGHT: 187 LBS | OXYGEN SATURATION: 96 % | SYSTOLIC BLOOD PRESSURE: 118 MMHG | HEART RATE: 64 BPM

## 2023-12-04 DIAGNOSIS — N40.1 BPH WITH OBSTRUCTION/LOWER URINARY TRACT SYMPTOMS: ICD-10-CM

## 2023-12-04 DIAGNOSIS — K21.9 GERD WITHOUT ESOPHAGITIS: ICD-10-CM

## 2023-12-04 DIAGNOSIS — I48.91 NEW ONSET A-FIB (MULTI): ICD-10-CM

## 2023-12-04 DIAGNOSIS — Z12.11 SCREENING FOR COLON CANCER: Primary | ICD-10-CM

## 2023-12-04 DIAGNOSIS — N13.8 BPH WITH OBSTRUCTION/LOWER URINARY TRACT SYMPTOMS: ICD-10-CM

## 2023-12-04 PROCEDURE — 99214 OFFICE O/P EST MOD 30 MIN: CPT | Performed by: FAMILY MEDICINE

## 2023-12-04 PROCEDURE — 1036F TOBACCO NON-USER: CPT | Performed by: FAMILY MEDICINE

## 2023-12-04 PROCEDURE — 3008F BODY MASS INDEX DOCD: CPT | Performed by: FAMILY MEDICINE

## 2023-12-04 PROCEDURE — 1160F RVW MEDS BY RX/DR IN RCRD: CPT | Performed by: FAMILY MEDICINE

## 2023-12-04 PROCEDURE — 1159F MED LIST DOCD IN RCRD: CPT | Performed by: FAMILY MEDICINE

## 2023-12-04 PROCEDURE — 1126F AMNT PAIN NOTED NONE PRSNT: CPT | Performed by: FAMILY MEDICINE

## 2023-12-04 RX ORDER — TAMSULOSIN HYDROCHLORIDE 0.4 MG/1
0.4 CAPSULE ORAL DAILY
Qty: 90 CAPSULE | Refills: 1 | Status: SHIPPED | OUTPATIENT
Start: 2023-12-04

## 2023-12-04 RX ORDER — OMEPRAZOLE 40 MG/1
40 CAPSULE, DELAYED RELEASE ORAL DAILY
Qty: 90 CAPSULE | Refills: 1 | Status: SHIPPED | OUTPATIENT
Start: 2023-12-04 | End: 2023-12-11 | Stop reason: ALTCHOICE

## 2023-12-04 ASSESSMENT — ENCOUNTER SYMPTOMS
PALPITATIONS: 0
CONSTIPATION: 0
VOMITING: 0
UNEXPECTED WEIGHT CHANGE: 0

## 2023-12-04 NOTE — PROGRESS NOTES
Subjective   Patient ID: Agusto Siddiqi is a 73 y.o. male who presents for Follow-up. S/P  gb removal just wants to make sure everything is OK, sees cardiology next week     HPI   Patient has hx of stable AF, hyperlipidemia.  Pt denies chest pain, shortness of breath and edema.  Patient's current treatment as listed in Rx.  Patient is compliant with treatment and complains of no side effects associated treatment.    Review of Systems   Constitutional:  Negative for unexpected weight change.   HENT:  Negative for congestion and ear discharge.    Cardiovascular:  Negative for chest pain and palpitations.   Gastrointestinal:  Negative for constipation and vomiting.   All other systems reviewed and are negative.      Objective   /62   Pulse 64   Wt 84.8 kg (187 lb)   SpO2 96%   BMI 29.29 kg/m²     Physical Exam  HENT:      Head: Normocephalic and atraumatic.      Nose: Nose normal.      Mouth/Throat:      Mouth: Mucous membranes are moist.      Pharynx: No oropharyngeal exudate.   Eyes:      Extraocular Movements: Extraocular movements intact.      Conjunctiva/sclera: Conjunctivae normal.      Pupils: Pupils are equal, round, and reactive to light.   Cardiovascular:      Rate and Rhythm: Normal rate and regular rhythm.   Pulmonary:      Effort: Pulmonary effort is normal.   Abdominal:      General: There is no distension.      Palpations: Abdomen is soft.   Musculoskeletal:      Cervical back: Normal range of motion and neck supple.   Lymphadenopathy:      Cervical: No cervical adenopathy.   Neurological:      General: No focal deficit present.      Mental Status: He is alert.   Psychiatric:         Attention and Perception: Attention normal.         Speech: Speech normal.         Behavior: Behavior is cooperative.     Abdominal incisions for cholecystectomy are well-healed without any signs of inflammation    Assessment/Plan   Diagnoses and all orders for this visit:  Screening for colon cancer  -     Fecal  Occult Blood Immunoassy; Future  BPH with obstruction/lower urinary tract symptoms  Comments:  Treated and controlled  Orders:  -     tamsulosin (Flomax) 0.4 mg 24 hr capsule; Take 1 capsule (0.4 mg) by mouth once daily.  GERD without esophagitis  Comments:  Refill sent  Lifestyle modification  Orders:  -     omeprazole (PriLOSEC) 40 mg DR capsule; Take 1 capsule (40 mg) by mouth once daily.  New onset a-fib (CMS/HCC)  Comments:  Follow-up with cardiology  Orders:  -     apixaban (Eliquis) 5 mg tablet; Take 1 tablet (5 mg) by mouth every 12 hours.  GUERRERO LM discussed  Recheck 6 months sooner if any issues arise

## 2023-12-05 NOTE — POST-PROCEDURE NOTE
Physician Transition of Care Summary  Invasive Cardiovascular Lab    Procedure Date: 12/5/2023  Attending:    * Dillon Mathews - Primary  Resident/Fellow/Other Assistant: Surgeon(s) and Role:    Indications:   Pre-op Diagnosis     * New onset a-fib (CMS/HCC) [I48.91]    Post-procedure diagnosis:   Post-op Diagnosis     * New onset a-fib (CMS/HCC) [I48.91]    Procedure(s):     * Cardioversion      Procedure Findings:   Atrial fibrillation, persistent    Description of the Procedure:   The procedure was explained to the patient with risks and benefits including risk of stroke prior to the transesophageal echocardiogram and initiation of sedation. The patient understands as well as her . The patient had already a transesophageal echocardiogram showing no left atrial appendage thrombus or thrombus in the left atrium. There was spontaneous echocardiogram contrast noticed. The patient was already anticoagulated and deep sedation with Brevital was achieved (see nursing note for dosage).  The pads applied in the anterior and posterior approach. With synchronized biphasic waveform at 200 J, patient was successful in restoring sinus rhythm but quickly degenerated into atrial fibrillation again. The patient had no immediate post-procedure complications.     Complications:   None    Stents/Implants:       Anticoagulation/Antiplatelet Plan:   Therapeutic anticoagulation with     Estimated Blood Loss:   0 mL    Anesthesia: General Anesthesia Staff: CRNA: SARAI Knott-CRNA    Any Specimen(s) Removed:   No specimens collected during this procedure.    Disposition:   Post cardioversion order set. Rate control.      Electronically signed by: Dillon Mathews MD, 12/5/2023 9:09 AM

## 2023-12-05 NOTE — OP NOTE
"Cardioversion Operative Note     Date: 10/31/2023  OR Location: [facility] Cardiac Cath Lab    Name: Santy Siddiqi \"Agusto\", : 1950, Age: 73 y.o., MRN: [id number], Sex: male    Diagnosis  Pre-op Diagnosis     * New onset a-fib (CMS/HCC) [I48.91] Post-op Diagnosis     * New onset a-fib (CMS/HCC) [I48.91]     Procedures    * Cardioversion    Surgeons      * Dillon Mathews - Primary    Resident/Fellow/Other Assistant:  Surgeon(s) and Role:    Procedure Summary  Anesthesia: General  ASA: III  Anesthesia Staff: CRNA: SARAI Knott-CRNA  Estimated Blood Loss: 0mL  Intra-op Medications:   Medication Name Total Dose   enoxaparin (Lovenox) syringe 80 mg 80 mg   metoclopramide (Reglan) injection 5 mg 5 mg              Anesthesia Record               Intraprocedure I/O Totals          Intake    Propofol Drip 0.00 mL    The total shown is the total volume documented since Anesthesia Start was filed.    Diltiazem Drip 0.00 mL    The total shown is the total volume documented since Anesthesia Start was filed.    Total Intake 0 mL          Specimen: No specimens collected     Staff:   No surgical staff documented.         Drains and/or Catheters: * None in log *    Tourniquet Times:         Implants:     Findings: Atrial fibrillation    Indications: Agusto Siddiqi is an 73 y.o. male who is having DCCV for New onset a-fib (CMS/HCC) [I48.91].       Procedure Details: The patient was seen in the preprocedure area. The procedure was explained to the patient with risks and benefits including risk of stroke prior to the transesophageal echocardiogram and initiation of sedation. The patient understands as well as her . The patient had already a transesophageal echocardiogram showing no left atrial appendage thrombus or thrombus in the left atrium. There was spontaneous echocardiogram contrast noticed. The patient was already anticoagulated and deep sedation achieved with anesthesiology.  The pads applied in the anterior and " posterior approach. With synchronized biphasic waveform at 200 J, one shock was successful in restoring sinus rhythm, however, it quickly degenerated to atrial fibrillation. The patient had no immediate post-procedure complications.   Complications:  None; patient tolerated the procedure well.    Disposition:  Post DCCV order set.   Condition: stable         Additional Details: DCCV with recurrent atrial fibrillation; will rate control.    Attending Attestation: I was present and scrubbed for the entire procedure.    [facility]  Phone Number: [phone]

## 2023-12-06 ENCOUNTER — TELEPHONE (OUTPATIENT)
Dept: PRIMARY CARE | Facility: CLINIC | Age: 73
End: 2023-12-06
Payer: COMMERCIAL

## 2023-12-06 NOTE — TELEPHONE ENCOUNTER
PT is requesting a nurse call. Has questions regarding his colon cancer screening and what he needs to do. Please call.

## 2023-12-06 NOTE — TELEPHONE ENCOUNTER
Spoke with pt. States he received an order, but did not get an envelope with collection supplies. Will try to have wife drop by this afternoon.

## 2023-12-11 ENCOUNTER — OFFICE VISIT (OUTPATIENT)
Dept: CARDIOLOGY | Facility: HOSPITAL | Age: 73
End: 2023-12-11
Payer: COMMERCIAL

## 2023-12-11 VITALS
HEART RATE: 66 BPM | WEIGHT: 186.73 LBS | DIASTOLIC BLOOD PRESSURE: 72 MMHG | BODY MASS INDEX: 29.25 KG/M2 | SYSTOLIC BLOOD PRESSURE: 120 MMHG | OXYGEN SATURATION: 96 %

## 2023-12-11 DIAGNOSIS — I48.91 NEW ONSET A-FIB (MULTI): Primary | ICD-10-CM

## 2023-12-11 LAB
ATRIAL RATE: 53 BPM
P AXIS: 44 DEGREES
P OFFSET: 198 MS
P ONSET: 140 MS
PR INTERVAL: 142 MS
Q ONSET: 211 MS
QRS COUNT: 8 BEATS
QRS DURATION: 86 MS
QT INTERVAL: 416 MS
QTC CALCULATION(BAZETT): 390 MS
QTC FREDERICIA: 399 MS
R AXIS: -26 DEGREES
T AXIS: 7 DEGREES
T OFFSET: 419 MS
VENTRICULAR RATE: 53 BPM

## 2023-12-11 PROCEDURE — 99214 OFFICE O/P EST MOD 30 MIN: CPT | Performed by: STUDENT IN AN ORGANIZED HEALTH CARE EDUCATION/TRAINING PROGRAM

## 2023-12-11 PROCEDURE — 1036F TOBACCO NON-USER: CPT | Performed by: STUDENT IN AN ORGANIZED HEALTH CARE EDUCATION/TRAINING PROGRAM

## 2023-12-11 PROCEDURE — 93005 ELECTROCARDIOGRAM TRACING: CPT | Performed by: STUDENT IN AN ORGANIZED HEALTH CARE EDUCATION/TRAINING PROGRAM

## 2023-12-11 PROCEDURE — 1159F MED LIST DOCD IN RCRD: CPT | Performed by: STUDENT IN AN ORGANIZED HEALTH CARE EDUCATION/TRAINING PROGRAM

## 2023-12-11 PROCEDURE — 3008F BODY MASS INDEX DOCD: CPT | Performed by: STUDENT IN AN ORGANIZED HEALTH CARE EDUCATION/TRAINING PROGRAM

## 2023-12-11 PROCEDURE — 1126F AMNT PAIN NOTED NONE PRSNT: CPT | Performed by: STUDENT IN AN ORGANIZED HEALTH CARE EDUCATION/TRAINING PROGRAM

## 2023-12-11 PROCEDURE — 1160F RVW MEDS BY RX/DR IN RCRD: CPT | Performed by: STUDENT IN AN ORGANIZED HEALTH CARE EDUCATION/TRAINING PROGRAM

## 2023-12-11 ASSESSMENT — ENCOUNTER SYMPTOMS
DIZZINESS: 0
SHORTNESS OF BREATH: 0
FEVER: 0
CONFUSION: 0
PALPITATIONS: 0

## 2023-12-11 NOTE — PROGRESS NOTES
Referred by Dr. Goncalves for New Patient Visit (Hospital follow up. Afib. )     History Of Present Illness:    Agusto Siddiqi is a 73 y.o. male with PMH of HLD, Asthma, GERD. The patient is s/p lap bertrand for gangrenous acute cholecystitis (10/29/23) presenting with post op (new onset) afib s/p DCCV (10/31/2023) followed by recurrence.   The patient states that he has felt occasional palpitations for the past 10 years. Denies CP, SOB, fatigue, near syncope. After discharged from the hospital, he reports feeling occasional palpitations. He is currently in sinus rhythm with HR 53 bpm.      Past Medical History:  He has a past medical history of Acute bronchitis (12/16/2022), Inadequate sleep hygiene, Overactive bladder, Personal history of colonic polyps, Personal history of other diseases of male genital organs, Personal history of other diseases of the digestive system, Personal history of other diseases of the digestive system, Personal history of other diseases of the musculoskeletal system and connective tissue, Personal history of other specified conditions, and Wheezing (12/16/2022).    Past Surgical History:  He has a past surgical history that includes Nose surgery (11/07/2017); Colonoscopy (11/07/2017); Hernia repair; and Cardiac electrophysiology procedure (N/A, 10/31/2023).      Social History:  He reports that he has quit smoking. His smoking use included cigarettes. He has never used smokeless tobacco. He reports that he does not currently use alcohol. He reports that he does not use drugs.    Family History:  Family History   Problem Relation Name Age of Onset    Pancreatic cancer Mother      Cirrhosis Father          Allergies:  Patient has no known allergies.    Outpatient Medications:  Current Outpatient Medications   Medication Instructions    apixaban (ELIQUIS) 5 mg, oral, Every 12 hours    budesonide-formoteroL (Symbicort) 160-4.5 mcg/actuation inhaler 2 puffs, inhalation, 2 times daily, RINSE  MOUTH AFTER USE    dilTIAZem XR (DILACOR XR) 480 mg, oral, Daily    ipratropium (Atrovent) 21 mcg (0.03 %) nasal spray use 2 sprays in each nostril in the morning and 2 sprays in the evening and 2 sprays before bedtime    metoprolol tartrate (LOPRESSOR) 12.5 mg, oral, 2 times daily    tamsulosin (FLOMAX) 0.4 mg, oral, Daily        Last Recorded Vitals:  Vitals:    12/11/23 1101   BP: 120/72   Pulse: 66   SpO2: 96%   Weight: 84.7 kg (186 lb 11.7 oz)       Review of Systems   Constitutional:  Negative for fever.   Respiratory:  Negative for shortness of breath.    Cardiovascular:  Negative for chest pain, palpitations and leg swelling.        As per history.   Neurological:  Negative for dizziness and syncope.   Psychiatric/Behavioral:  Negative for confusion.       Physical Exam  Constitutional:       Appearance: Normal appearance.   Cardiovascular:      Rate and Rhythm: Normal rate and regular rhythm.      Heart sounds: No murmur heard.     No friction rub. No gallop.   Pulmonary:      Effort: Pulmonary effort is normal.      Breath sounds: Normal breath sounds.   Abdominal:      Palpations: Abdomen is soft.   Musculoskeletal:      Cervical back: Neck supple.   Neurological:      Mental Status: He is alert.   Psychiatric:         Mood and Affect: Mood normal.         Behavior: Behavior normal.             Last Labs:  CBC -  Lab Results   Component Value Date    WBC 5.0 11/07/2023    HGB 12.9 (L) 11/07/2023    HCT 41.3 11/07/2023    MCV 97 11/07/2023     11/07/2023       CMP -  Lab Results   Component Value Date    CALCIUM 9.1 11/07/2023    PHOS 1.9 (L) 10/31/2023    PROT 6.5 11/07/2023    ALBUMIN 3.7 11/07/2023    AST 20 11/07/2023    ALT 65 (H) 11/07/2023    ALKPHOS 119 11/07/2023    BILITOT 0.4 11/07/2023       LIPID PANEL -   Lab Results   Component Value Date    CHOL 187 04/14/2021    TRIG 115 04/14/2021    HDL 40.3 04/14/2021    CHHDL 4.6 04/14/2021    LDLF 124 (H) 04/14/2021    VLDL 23 04/14/2021  "      RENAL FUNCTION PANEL -   Lab Results   Component Value Date    GLUCOSE 92 11/07/2023     11/07/2023    K 5.4 (H) 11/07/2023     11/07/2023    CO2 29 11/07/2023    ANIONGAP 12 11/07/2023    BUN 23 11/07/2023    CREATININE 1.13 11/07/2023    GFRMALE 78 02/21/2023    CALCIUM 9.1 11/07/2023    PHOS 1.9 (L) 10/31/2023    ALBUMIN 3.7 11/07/2023        No results found for: \"BNP\", \"HGBA1C\"    Last Cardiology Tests:  ECG:  ECG 12 Lead   12/11/2023  Sinus rhythm, HR 53 bpm.    Echo:  Transthoracic Echo (TTE) Complete 10/31/2023    Left Ventricle: The left ventricular systolic function is normal. There are no regional wall motion abnormalities. The left ventricular cavity size is normal. Left ventricular diastolic filling was not assessed.  Left Atrium: The left atrium is upper limits of normal in size. There is no definite left atrial thrombus present. There is a normal sized left atrial appendage.  Right Ventricle: The right ventricle is normal in size. There is normal right ventricular global systolic function.  Right Atrium: The right atrium is normal in size.  Aortic Valve: The aortic valve is trileaflet. There is trivial aortic valve regurgitation.  Mitral Valve: The mitral valve is mildly thickened. There is mild mitral valve regurgitation.  Tricuspid Valve: The tricuspid valve is structurally normal. There is trace tricuspid regurgitation.  Pulmonic Valve: The pulmonic valve is structurally normal. There is trace pulmonic valve regurgitation.  Pericardium: There is no pericardial effusion noted.  Aorta: The aortic root is normal.        CONCLUSIONS:   1. Left ventricular systolic function is normal.   2. No left atrial thrombus.    Assessment/Plan       I had a long discussion with the patient about atrial fibrillation and associated risk of stroke.  Will stop his metoprolol after taking 1/2 the dose for 5 days. Will keep diltiazem and Eliquis.  Will order event monitor in February and schedule a " follow up.    Clare Castellon MD

## 2023-12-14 PROCEDURE — 82274 ASSAY TEST FOR BLOOD FECAL: CPT

## 2023-12-15 ENCOUNTER — LAB REQUISITION (OUTPATIENT)
Dept: LAB | Facility: HOSPITAL | Age: 73
End: 2023-12-15
Payer: COMMERCIAL

## 2023-12-15 DIAGNOSIS — Z12.11 ENCOUNTER FOR SCREENING FOR MALIGNANT NEOPLASM OF COLON: ICD-10-CM

## 2023-12-16 LAB — HEMOCCULT STL QL IA: NEGATIVE

## 2023-12-19 ENCOUNTER — OFFICE VISIT (OUTPATIENT)
Dept: OTOLARYNGOLOGY | Facility: CLINIC | Age: 73
End: 2023-12-19
Payer: COMMERCIAL

## 2023-12-19 VITALS — HEIGHT: 67 IN | WEIGHT: 186 LBS | BODY MASS INDEX: 29.19 KG/M2

## 2023-12-19 DIAGNOSIS — J30.9 ALLERGIC RHINITIS, UNSPECIFIED SEASONALITY, UNSPECIFIED TRIGGER: ICD-10-CM

## 2023-12-19 DIAGNOSIS — J32.9 CHRONIC SINUSITIS, UNSPECIFIED LOCATION: ICD-10-CM

## 2023-12-19 DIAGNOSIS — R09.A2 GLOBUS SENSATION: ICD-10-CM

## 2023-12-19 DIAGNOSIS — R09.82 POST-NASAL DRIP: Primary | ICD-10-CM

## 2023-12-19 DIAGNOSIS — R13.14 PHARYNGOESOPHAGEAL DYSPHAGIA: ICD-10-CM

## 2023-12-19 DIAGNOSIS — J32.4 CHRONIC PANSINUSITIS: ICD-10-CM

## 2023-12-19 PROCEDURE — 1126F AMNT PAIN NOTED NONE PRSNT: CPT | Performed by: OTOLARYNGOLOGY

## 2023-12-19 PROCEDURE — 3008F BODY MASS INDEX DOCD: CPT | Performed by: OTOLARYNGOLOGY

## 2023-12-19 PROCEDURE — 99204 OFFICE O/P NEW MOD 45 MIN: CPT | Performed by: OTOLARYNGOLOGY

## 2023-12-19 PROCEDURE — 1159F MED LIST DOCD IN RCRD: CPT | Performed by: OTOLARYNGOLOGY

## 2023-12-19 PROCEDURE — 1036F TOBACCO NON-USER: CPT | Performed by: OTOLARYNGOLOGY

## 2023-12-19 PROCEDURE — 1160F RVW MEDS BY RX/DR IN RCRD: CPT | Performed by: OTOLARYNGOLOGY

## 2023-12-19 NOTE — PROGRESS NOTES
"Chief Complaint   Patient presents with    Sinus Problem     NP- PND, ACID REFLUX, SINUS ISSUES      Date of Evaluation: 12/19/2023   HPI  Santy Siddiqi \"Don\" is a 73 y.o. male with a chronic history of postnasal drip.  This has been going on for several years.  He has been tried on Flonase and Atrovent nasal spray without benefit.  He is on Symbicort for asthma.  He had a history of a hiatal hernia that was repaired and he also reported repair of something in the upper esophagus.  He is able to swallow.  He has been on omeprazole for years but does not feel as though it is doing anything for him.  Breathing is normal       Past Medical History:   Diagnosis Date    Acute bronchitis 12/16/2022    Asthma     Hypertension     Inadequate sleep hygiene     History of difficulty sleeping    Overactive bladder     Overactive bladder    Personal history of colonic polyps     History of colonic polyps    Personal history of other diseases of male genital organs     History of benign prostatic hyperplasia    Personal history of other diseases of the digestive system     History of diverticulitis    Personal history of other diseases of the digestive system     History of hemorrhoids    Personal history of other diseases of the musculoskeletal system and connective tissue     History of degenerative disc disease    Personal history of other specified conditions     History of fatigue    Wheezing 12/16/2022      Past Surgical History:   Procedure Laterality Date    CARDIAC ELECTROPHYSIOLOGY PROCEDURE N/A 10/31/2023    Procedure: Cardioversion;  Surgeon: Dillon Mathews MD;  Location: [facility] Cardiac Cath Lab;  Service: Cardiovascular;  Laterality: N/A;    COLONOSCOPY  11/07/2017    Colonoscopy    HERNIA REPAIR      NOSE SURGERY  11/07/2017    Nose Surgery          Medications:   Current Outpatient Medications   Medication Instructions    apixaban (ELIQUIS) 5 mg, oral, Every 12 hours    budesonide-formoteroL (Symbicort) 160-4.5 " "mcg/actuation inhaler 2 puffs, inhalation, 2 times daily, RINSE MOUTH AFTER USE    dilTIAZem XR (DILACOR XR) 480 mg, oral, Daily    ipratropium (Atrovent) 21 mcg (0.03 %) nasal spray use 2 sprays in each nostril in the morning and 2 sprays in the evening and 2 sprays before bedtime    tamsulosin (FLOMAX) 0.4 mg, oral, Daily        Allergies:  No Known Allergies     Physical Exam:  Last Recorded Vitals  Height 1.702 m (5' 7\"), weight 84.4 kg (186 lb).  []General appearance: Well-developed, well-nourished in no acute distress, conversant with normal voice quality    Head/face: No erythema or edema or facial tenderness, and normal facial nerve function bilaterally    External ear: Clear external auditory canals with normal pinnae  Tube status: N/A  Middle ear: Tympanic membranes intact and mobile, middle ears normal.  Tympanic membrane perforation: N/A  Mastoid bowl: N/A  Hearing: Normal conversational awareness at normal speech thresholds    Nose visualized using: Anterior rhinoscopy  Nasal dorsum: Nontraumatic midline appearance  Septum: Midline, nonobstructing  Inferior turbinates: Normal, pink  Secretions: Dry    Oral cavity and oropharynx: Normal  Teeth: Good condition  Floor of mouth: without lesions  Palate: Normal hard palate, soft palate and uvula  Oropharynx: Clear, no lesions present  Buccal mucosa: Normal without masses or lesions  Lips: Normal    Nasopharynx: Inadequate mirror exam secondary to gag/anatomy    Larynx and hypopharynx: Mirror examination adequate and revealed epiglottis normal, vocal cord mobility normal, normal mucosa, false vocal cords normal, true vocal cords normal except for posterior commissure erythema dysphagia    Neck:  Salivary glands: Normal bilateral parotid and submandibular glands by inspection and palpation.  Non-thyroid masses: No palpable masses or significant lymphadenopathy  Trachea: Midline  Thyroid: No thyromegaly or palpable nodules  Temporomandibular joint: " Nontender  Cervical range of motion: Normal    Neurologic exam: Alert and oriented x3, appropriate affect.  Cranial nerves II-XII normal bilaterally  Extraocular movement: Extraocular movement intact, normal gaze alignment          Santy was seen today for sinus problem.  Diagnoses and all orders for this visit:  Post-nasal drip (Primary)  Globus sensation  Allergic rhinitis, unspecified seasonality, unspecified trigger  Pharyngoesophageal dysphagia  -     FL modified barium swallow study; Future  Chronic pansinusitis  Chronic sinusitis, unspecified location  -     CT sinus wo IV contrast; Future       PLAN  The differential for his postnasal drip and globus sensation includes dysphagia gastroesophageal reflux disease chronic sinusitis and allergic rhinitis.  I would like to check a CT scan of the paranasal sinuses.  He has been on antibiotics Flonase and Atrovent without resolution.  Secondly I would like a modified barium swallow to see his postoperative changes in the esophagus.  I would like him to switch from omeprazole to Gaviscon 2 tablets 3 times daily.  Recheck in 6 weeks    Antione Shane MD

## 2024-01-04 ENCOUNTER — HOSPITAL ENCOUNTER (OUTPATIENT)
Dept: RADIOLOGY | Facility: HOSPITAL | Age: 74
Discharge: HOME | End: 2024-01-04
Payer: COMMERCIAL

## 2024-01-04 DIAGNOSIS — J32.9 CHRONIC SINUSITIS, UNSPECIFIED LOCATION: ICD-10-CM

## 2024-01-04 PROCEDURE — 70486 CT MAXILLOFACIAL W/O DYE: CPT

## 2024-01-05 ENCOUNTER — APPOINTMENT (OUTPATIENT)
Dept: OTOLARYNGOLOGY | Facility: CLINIC | Age: 74
End: 2024-01-05
Payer: COMMERCIAL

## 2024-02-09 ENCOUNTER — HOSPITAL ENCOUNTER (OUTPATIENT)
Dept: RADIOLOGY | Facility: HOSPITAL | Age: 74
Discharge: HOME | End: 2024-02-09
Payer: COMMERCIAL

## 2024-02-09 DIAGNOSIS — R13.14 PHARYNGOESOPHAGEAL DYSPHAGIA: ICD-10-CM

## 2024-02-09 PROCEDURE — 74230 X-RAY XM SWLNG FUNCJ C+: CPT | Performed by: RADIOLOGY

## 2024-02-09 PROCEDURE — 92611 MOTION FLUOROSCOPY/SWALLOW: CPT | Mod: GN | Performed by: PHARMACIST

## 2024-02-09 PROCEDURE — 74230 X-RAY XM SWLNG FUNCJ C+: CPT

## 2024-02-09 PROCEDURE — 2500000001 HC RX 250 WO HCPCS SELF ADMINISTERED DRUGS (ALT 637 FOR MEDICARE OP): Performed by: OTOLARYNGOLOGY

## 2024-02-09 PROCEDURE — 3430000001 HC RX 343 DIAGNOSTIC RADIOPHARMACEUTICALS: Performed by: OTOLARYNGOLOGY

## 2024-02-09 RX ADMIN — BARIUM SULFATE 1 TABLET: 700 TABLET ORAL at 14:36

## 2024-02-09 RX ADMIN — BARIUM SULFATE 25 ML: 400 PASTE ORAL at 14:34

## 2024-02-09 RX ADMIN — BARIUM SULFATE 5 ML: 400 SUSPENSION ORAL at 14:34

## 2024-02-09 RX ADMIN — BARIUM SULFATE 110 ML: 400 SUSPENSION ORAL at 14:35

## 2024-02-09 RX ADMIN — BARIUM SULFATE 30 ML: 400 SUSPENSION ORAL at 14:33

## 2024-02-09 NOTE — PROCEDURES
"    Modified Barium Swallow Study     Patient Name: Santy Siddiqi \"Agusto\"  MRN: [id number]  : 1950  Today's Date: 24   Start time:  1330  Stop time:  1424  Time calculation (min) :  54    Recommendations:  Continue regular texture diet and thin liquids. Upright positioning for all PO intake, slow rate of intake, small sips at a time. GERD precautions: Upright positioning for PO intake and remain upright for >30 minutes after meals, chew thoroughly, stop eating at first sign of satiety, avoid caffeine, avoid fatty foods, avoid acidic foods, sleep at an incline, stay well hydrated.     Assessment/Impression:    Full detailed SLP/Radiologist Modified Barium Swallow study report can be found under Chart Review tab, Imaging tab and  titled \"FL Modified Barium Swallow Study\"      Pt. Presenting with mild oropharyngeal dysphagia characterized by penetration and trace/transient aspiration of large sips of thin liquid. Prominence of the cricopharyngeus.     Plan:  SLP Plan: No treatment recommended at this time.  Discussed POC: Patient    Pain:   Rating 0-10: 0    Education:  Pt viewed flouro images while SLP educated re: swallow anatomy/physiology, results of study, risk for aspiration, and recommendation for skilled SLP services.  "

## 2024-02-12 ENCOUNTER — APPOINTMENT (OUTPATIENT)
Dept: CARDIOLOGY | Facility: HOSPITAL | Age: 74
End: 2024-02-12
Payer: COMMERCIAL

## 2024-02-21 LAB
ATRIAL RATE: 234 BPM
ATRIAL RATE: 90 BPM
P OFFSET: 154 MS
P ONSET: 139 MS
Q ONSET: 212 MS
Q ONSET: 229 MS
QRS COUNT: 17 BEATS
QRS COUNT: 21 BEATS
QRS DURATION: 80 MS
QRS DURATION: 84 MS
QT INTERVAL: 344 MS
QT INTERVAL: 350 MS
QTC CALCULATION(BAZETT): 448 MS
QTC CALCULATION(BAZETT): 501 MS
QTC FREDERICIA: 410 MS
QTC FREDERICIA: 444 MS
R AXIS: -26 DEGREES
R AXIS: -38 DEGREES
T AXIS: 14 DEGREES
T AXIS: 7 DEGREES
T OFFSET: 384 MS
T OFFSET: 404 MS
VENTRICULAR RATE: 102 BPM
VENTRICULAR RATE: 123 BPM

## 2024-04-08 ENCOUNTER — APPOINTMENT (OUTPATIENT)
Dept: CARDIOLOGY | Facility: HOSPITAL | Age: 74
End: 2024-04-08
Payer: COMMERCIAL

## 2024-04-10 PROBLEM — Z86.010 HISTORY OF COLONIC POLYPS: Status: ACTIVE | Noted: 2024-04-10

## 2024-04-10 PROBLEM — R09.A2 SENSATION OF LUMP IN THROAT: Status: ACTIVE | Noted: 2024-04-10

## 2024-04-10 PROBLEM — Z86.0100 HISTORY OF COLONIC POLYPS: Status: ACTIVE | Noted: 2024-04-10

## 2024-04-10 PROBLEM — N32.81 OVERACTIVE BLADDER: Status: ACTIVE | Noted: 2024-04-10

## 2024-04-10 PROBLEM — J30.9 ALLERGIC RHINITIS: Status: ACTIVE | Noted: 2024-04-10

## 2024-04-10 PROBLEM — R09.82 POSTNASAL DRIP: Status: ACTIVE | Noted: 2024-04-10

## 2024-04-10 PROBLEM — J31.0 CHRONIC RHINITIS: Status: ACTIVE | Noted: 2024-04-10

## 2024-04-10 PROBLEM — R12 HEARTBURN: Status: ACTIVE | Noted: 2022-11-01

## 2024-04-10 PROBLEM — R00.2 PALPITATIONS: Status: ACTIVE | Noted: 2023-02-17

## 2024-04-10 PROBLEM — J32.4 CHRONIC PANSINUSITIS: Status: ACTIVE | Noted: 2024-04-10

## 2024-04-10 PROBLEM — I48.91 ATRIAL FIBRILLATION (MULTI): Status: ACTIVE | Noted: 2023-10-27

## 2024-04-12 ENCOUNTER — LAB (OUTPATIENT)
Dept: LAB | Facility: LAB | Age: 74
End: 2024-04-12
Payer: COMMERCIAL

## 2024-04-12 ENCOUNTER — OFFICE VISIT (OUTPATIENT)
Dept: PRIMARY CARE | Facility: CLINIC | Age: 74
End: 2024-04-12
Payer: COMMERCIAL

## 2024-04-12 VITALS
HEIGHT: 67 IN | WEIGHT: 186.2 LBS | SYSTOLIC BLOOD PRESSURE: 126 MMHG | DIASTOLIC BLOOD PRESSURE: 72 MMHG | HEART RATE: 62 BPM | OXYGEN SATURATION: 94 % | TEMPERATURE: 98 F | BODY MASS INDEX: 29.22 KG/M2

## 2024-04-12 DIAGNOSIS — I48.20 CHRONIC ATRIAL FIBRILLATION (MULTI): Primary | ICD-10-CM

## 2024-04-12 DIAGNOSIS — E78.2 COMBINED HYPERLIPIDEMIA: ICD-10-CM

## 2024-04-12 DIAGNOSIS — Z00.00 ROUTINE GENERAL MEDICAL EXAMINATION AT HEALTH CARE FACILITY: ICD-10-CM

## 2024-04-12 DIAGNOSIS — Z12.5 SCREENING FOR PROSTATE CANCER: ICD-10-CM

## 2024-04-12 DIAGNOSIS — J41.1 MUCOPURULENT CHRONIC BRONCHITIS (MULTI): ICD-10-CM

## 2024-04-12 DIAGNOSIS — J45.40 MODERATE PERSISTENT ASTHMA WITHOUT COMPLICATION (HHS-HCC): ICD-10-CM

## 2024-04-12 PROBLEM — L40.0 PLAQUE PSORIASIS: Status: RESOLVED | Noted: 2023-03-03 | Resolved: 2024-04-12

## 2024-04-12 PROBLEM — R00.2 PALPITATIONS: Status: RESOLVED | Noted: 2023-02-17 | Resolved: 2024-04-12

## 2024-04-12 PROBLEM — R09.A2 SENSATION OF LUMP IN THROAT: Status: RESOLVED | Noted: 2024-04-10 | Resolved: 2024-04-12

## 2024-04-12 PROBLEM — I48.91 NEW ONSET A-FIB (MULTI): Status: RESOLVED | Noted: 2023-10-27 | Resolved: 2024-04-12

## 2024-04-12 PROBLEM — J42 CHRONIC BRONCHITIS (MULTI): Status: RESOLVED | Noted: 2023-03-03 | Resolved: 2024-04-12

## 2024-04-12 PROBLEM — R13.19 ESOPHAGEAL DYSPHAGIA: Status: RESOLVED | Noted: 2023-03-03 | Resolved: 2024-04-12

## 2024-04-12 LAB — PSA SERPL-MCNC: 1.83 NG/ML

## 2024-04-12 PROCEDURE — 1036F TOBACCO NON-USER: CPT | Performed by: FAMILY MEDICINE

## 2024-04-12 PROCEDURE — 1159F MED LIST DOCD IN RCRD: CPT | Performed by: FAMILY MEDICINE

## 2024-04-12 PROCEDURE — 99214 OFFICE O/P EST MOD 30 MIN: CPT | Performed by: FAMILY MEDICINE

## 2024-04-12 PROCEDURE — G0103 PSA SCREENING: HCPCS

## 2024-04-12 PROCEDURE — 1157F ADVNC CARE PLAN IN RCRD: CPT | Performed by: FAMILY MEDICINE

## 2024-04-12 PROCEDURE — G0439 PPPS, SUBSEQ VISIT: HCPCS | Performed by: FAMILY MEDICINE

## 2024-04-12 PROCEDURE — 1170F FXNL STATUS ASSESSED: CPT | Performed by: FAMILY MEDICINE

## 2024-04-12 PROCEDURE — 1160F RVW MEDS BY RX/DR IN RCRD: CPT | Performed by: FAMILY MEDICINE

## 2024-04-12 PROCEDURE — 36415 COLL VENOUS BLD VENIPUNCTURE: CPT

## 2024-04-12 RX ORDER — AZITHROMYCIN 250 MG/1
TABLET, FILM COATED ORAL DAILY
Qty: 6 TABLET | Refills: 0 | Status: SHIPPED | OUTPATIENT
Start: 2024-04-12 | End: 2024-04-16

## 2024-04-12 RX ORDER — PREDNISONE 10 MG/1
TABLET ORAL 3 TIMES DAILY
Qty: 14 TABLET | Refills: 0 | Status: SHIPPED | OUTPATIENT
Start: 2024-04-12 | End: 2024-04-19

## 2024-04-12 ASSESSMENT — PATIENT HEALTH QUESTIONNAIRE - PHQ9
2. FEELING DOWN, DEPRESSED OR HOPELESS: NOT AT ALL
1. LITTLE INTEREST OR PLEASURE IN DOING THINGS: NOT AT ALL
SUM OF ALL RESPONSES TO PHQ9 QUESTIONS 1 AND 2: 0

## 2024-04-12 ASSESSMENT — ENCOUNTER SYMPTOMS
DEPRESSION: 0
CONSTIPATION: 0
OCCASIONAL FEELINGS OF UNSTEADINESS: 0
COUGH: 1
VOMITING: 0
UNEXPECTED WEIGHT CHANGE: 0
LOSS OF SENSATION IN FEET: 0
PALPITATIONS: 0

## 2024-04-12 ASSESSMENT — ACTIVITIES OF DAILY LIVING (ADL)
TAKING_MEDICATION: INDEPENDENT
BATHING: INDEPENDENT
DRESSING: INDEPENDENT
MANAGING_FINANCES: INDEPENDENT
GROCERY_SHOPPING: INDEPENDENT
DOING_HOUSEWORK: INDEPENDENT

## 2024-04-12 NOTE — PROGRESS NOTES
"Subjective   Patient ID: Agusto Siddiqi is a 73 y.o. male who presents for URI, Cough, and Medicare Annual Wellness Visit Subsequent.    URI   Associated symptoms include coughing. Pertinent negatives include no chest pain, congestion or vomiting.   Cough  Pertinent negatives include no chest pain.    Patient has hx of stable hyperlipidemia.  Pt denies chest pain, shortness of breath and edema.  Patient's current treatment as listed in Rx.  Patient is compliant with treatment and complains of no side effects associated treatment.      Review of Systems   Constitutional:  Negative for unexpected weight change.   HENT:  Negative for congestion and ear discharge.    Respiratory:  Positive for cough.    Cardiovascular:  Negative for chest pain and palpitations.   Gastrointestinal:  Negative for constipation and vomiting.   All other systems reviewed and are negative.      Objective   /72   Pulse 62   Temp 36.7 °C (98 °F)   Ht 1.702 m (5' 7\")   Wt 84.5 kg (186 lb 3.2 oz)   SpO2 94%   BMI 29.16 kg/m²     Physical Exam  HENT:      Head: Normocephalic and atraumatic.      Nose: Nose normal.      Mouth/Throat:      Mouth: Mucous membranes are moist.      Pharynx: No oropharyngeal exudate.   Eyes:      Extraocular Movements: Extraocular movements intact.      Conjunctiva/sclera: Conjunctivae normal.      Pupils: Pupils are equal, round, and reactive to light.   Cardiovascular:      Rate and Rhythm: Normal rate and regular rhythm.   Pulmonary:      Effort: Pulmonary effort is normal.   Abdominal:      General: There is no distension.      Palpations: Abdomen is soft.   Musculoskeletal:      Cervical back: Normal range of motion and neck supple.   Lymphadenopathy:      Cervical: No cervical adenopathy.   Neurological:      General: No focal deficit present.      Mental Status: He is alert.   Psychiatric:         Attention and Perception: Attention normal.         Speech: Speech normal.         Behavior: Behavior is " cooperative.         Assessment/Plan   Diagnoses and all orders for this visit:  Chronic atrial fibrillation (Multi)  Combined hyperlipidemia  Moderate persistent asthma without complication ([facility]-HCC)  Screening for prostate cancer  -     Prostate Specific Antigen, Screen; Future  Routine general medical examination at health care facility  Mucopurulent chronic bronchitis (Multi)  -     azithromycin (Zithromax) 250 mg tablet; Take 2 tablets (500 mg) by mouth once daily for 1 day, THEN 1 tablet (250 mg) once daily for 4 days.  -     predniSONE (Deltasone) 10 mg tablet; Take 1 tablet (10 mg) by mouth 3 times a day for 2 days, THEN 1 tablet (10 mg) 2 times a day for 2 days, THEN 1 tablet (10 mg) once daily for 4 days.

## 2024-04-12 NOTE — PROGRESS NOTES
"Subjective   Reason for Visit: Santy Siddiqi is an 73 y.o. male here for a Medicare Wellness visit.     Past Medical, Surgical, and Family History reviewed and updated in chart.    Reviewed all medications by prescribing practitioner or clinical pharmacist (such as prescriptions, OTCs, herbal therapies and supplements) and documented in the medical record.  Pt weaned himself off blood thinners    HPI  Patient has hx of stable AF, hyperlipidemia.  Pt denies chest pain, shortness of breath and edema.  Patient's current treatment as listed in Rx.  Patient is compliant with treatment and complains of no side effects associated treatment.  Complains of cough with sputum and wheezing for the last month worse than usual    Patient Care Team:  Gage Hobbs MD as PCP - General (Family Medicine)  Gage Hobbs MD as PCP - [facility] [employer] PCP     Review of Systems   Constitutional:  Negative for unexpected weight change.   HENT:  Negative for congestion and ear discharge.    Cardiovascular:  Negative for chest pain and palpitations.   Gastrointestinal:  Negative for constipation and vomiting.   All other systems reviewed and are negative.      Objective   Vitals:  /72   Pulse 62   Temp 36.7 °C (98 °F)   Ht 1.702 m (5' 7\")   Wt 84.5 kg (186 lb 3.2 oz)   SpO2 94%   BMI 29.16 kg/m²       Physical Exam  HENT:      Head: Normocephalic and atraumatic.      Nose: Nose normal.      Mouth/Throat:      Mouth: Mucous membranes are moist.      Pharynx: No oropharyngeal exudate.   Eyes:      Extraocular Movements: Extraocular movements intact.      Conjunctiva/sclera: Conjunctivae normal.      Pupils: Pupils are equal, round, and reactive to light.   Cardiovascular:      Rate and Rhythm: Normal rate and regular rhythm.   Pulmonary:      Effort: Pulmonary effort is normal.      Breath sounds: Wheezing and rhonchi present.   Abdominal:      General: There is no distension.      Palpations: Abdomen is " soft.   Musculoskeletal:      Cervical back: Normal range of motion and neck supple.   Lymphadenopathy:      Cervical: No cervical adenopathy.   Neurological:      General: No focal deficit present.      Mental Status: He is alert.   Psychiatric:         Attention and Perception: Attention normal.         Speech: Speech normal.         Behavior: Behavior is cooperative.         Assessment/Plan   Problem List Items Addressed This Visit       Combined hyperlipidemia    Moderate persistent asthma without complication ([facility])    Atrial fibrillation (Multi) - Primary     Other Visit Diagnoses       Screening for prostate cancer        Relevant Orders    Prostate Specific Antigen, Screen    Routine general medical examination at health care facility        Mucopurulent chronic bronchitis (Multi)        Relevant Medications    azithromycin (Zithromax) 250 mg tablet    predniSONE (Deltasone) 10 mg tablet        Risk benefits discussed and add antibiotic and prednisone, note patient does have chronic bronchitis and we will plan chest x-ray if not better  Above conditions stable  HM LM discussed  Recheck 6 months sooner if any issues arise

## 2024-06-04 ENCOUNTER — APPOINTMENT (OUTPATIENT)
Dept: PRIMARY CARE | Facility: CLINIC | Age: 74
End: 2024-06-04
Payer: COMMERCIAL

## 2024-06-10 ENCOUNTER — TELEPHONE (OUTPATIENT)
Dept: PRIMARY CARE | Facility: CLINIC | Age: 74
End: 2024-06-10
Payer: COMMERCIAL

## 2024-06-10 DIAGNOSIS — N13.8 BPH WITH OBSTRUCTION/LOWER URINARY TRACT SYMPTOMS: ICD-10-CM

## 2024-06-10 DIAGNOSIS — N40.1 BPH WITH OBSTRUCTION/LOWER URINARY TRACT SYMPTOMS: ICD-10-CM

## 2024-06-10 NOTE — TELEPHONE ENCOUNTER
Rx Refill Request Telephone Encounter    Name:  Santy Siddiqi  :  [id number]  Medication Name:      tamsulosin (Flomax) 0.4 mg 24 hr capsule  Take 1 capsule (0.4 mg) by mouth once daily. - 90  day  Specific Pharmacy location:  [facility]  Date of last appointment:  2024  Date of next appointment:  na  Best number to reach patient:  [phone]

## 2024-06-11 RX ORDER — TAMSULOSIN HYDROCHLORIDE 0.4 MG/1
0.4 CAPSULE ORAL DAILY
Qty: 90 CAPSULE | Refills: 0 | Status: SHIPPED | OUTPATIENT
Start: 2024-06-11

## 2024-09-10 ENCOUNTER — TELEPHONE (OUTPATIENT)
Dept: PRIMARY CARE | Facility: CLINIC | Age: 74
End: 2024-09-10

## 2024-09-16 ENCOUNTER — TELEPHONE (OUTPATIENT)
Dept: PRIMARY CARE | Facility: CLINIC | Age: 74
End: 2024-09-16
Payer: COMMERCIAL

## 2024-09-16 DIAGNOSIS — N40.1 BPH WITH OBSTRUCTION/LOWER URINARY TRACT SYMPTOMS: ICD-10-CM

## 2024-09-16 DIAGNOSIS — N13.8 BPH WITH OBSTRUCTION/LOWER URINARY TRACT SYMPTOMS: ICD-10-CM

## 2024-09-16 RX ORDER — TAMSULOSIN HYDROCHLORIDE 0.4 MG/1
0.4 CAPSULE ORAL DAILY
Qty: 90 CAPSULE | Refills: 0 | Status: SHIPPED | OUTPATIENT
Start: 2024-09-16

## 2024-09-16 NOTE — TELEPHONE ENCOUNTER
PT STATES HE CALLED LAST WEEK AND ASKED FOR REFILL. PT WOULD LIKE ASAP    Rx Refill Request Telephone Encounter    Name:  Santy Siddiqi  :  [id number]  Medication Name:    TAMSULOSIN 0.4MG Q/D 90 DAY   Specific Pharmacy location:  [facility] [place]   Date of last appointment:  2024  Date of next appointment:  10/18/2024  Best number to reach patient:  [phone]

## 2024-10-18 ENCOUNTER — APPOINTMENT (OUTPATIENT)
Dept: PRIMARY CARE | Facility: CLINIC | Age: 74
End: 2024-10-18
Payer: COMMERCIAL

## 2024-10-25 ENCOUNTER — APPOINTMENT (OUTPATIENT)
Dept: PRIMARY CARE | Facility: CLINIC | Age: 74
End: 2024-10-25
Payer: COMMERCIAL

## 2024-10-25 ENCOUNTER — LAB (OUTPATIENT)
Dept: LAB | Facility: LAB | Age: 74
End: 2024-10-25
Payer: COMMERCIAL

## 2024-10-25 VITALS
DIASTOLIC BLOOD PRESSURE: 64 MMHG | HEART RATE: 66 BPM | OXYGEN SATURATION: 96 % | WEIGHT: 190.4 LBS | SYSTOLIC BLOOD PRESSURE: 118 MMHG | BODY MASS INDEX: 29.82 KG/M2

## 2024-10-25 DIAGNOSIS — I48.20 CHRONIC ATRIAL FIBRILLATION (MULTI): Primary | ICD-10-CM

## 2024-10-25 DIAGNOSIS — R09.82 POST-NASAL DRIP: ICD-10-CM

## 2024-10-25 DIAGNOSIS — E78.2 COMBINED HYPERLIPIDEMIA: ICD-10-CM

## 2024-10-25 DIAGNOSIS — N40.1 BPH WITH OBSTRUCTION/LOWER URINARY TRACT SYMPTOMS: ICD-10-CM

## 2024-10-25 DIAGNOSIS — N13.8 BPH WITH OBSTRUCTION/LOWER URINARY TRACT SYMPTOMS: ICD-10-CM

## 2024-10-25 DIAGNOSIS — Z12.11 ENCOUNTER FOR SCREENING FOR MALIGNANT NEOPLASM OF COLON: ICD-10-CM

## 2024-10-25 DIAGNOSIS — I48.20 CHRONIC ATRIAL FIBRILLATION (MULTI): ICD-10-CM

## 2024-10-25 DIAGNOSIS — J45.40 MODERATE PERSISTENT ASTHMA WITHOUT COMPLICATION (HHS-HCC): ICD-10-CM

## 2024-10-25 LAB
ALBUMIN SERPL BCP-MCNC: 4.2 G/DL (ref 3.4–5)
ALP SERPL-CCNC: 65 U/L (ref 33–136)
ALT SERPL W P-5'-P-CCNC: 15 U/L (ref 10–52)
ANION GAP SERPL CALC-SCNC: 10 MMOL/L (ref 10–20)
AST SERPL W P-5'-P-CCNC: 16 U/L (ref 9–39)
BASOPHILS # BLD AUTO: 0.02 X10*3/UL (ref 0–0.1)
BASOPHILS NFR BLD AUTO: 0.5 %
BILIRUB SERPL-MCNC: 0.4 MG/DL (ref 0–1.2)
BUN SERPL-MCNC: 15 MG/DL (ref 6–23)
CALCIUM SERPL-MCNC: 9.4 MG/DL (ref 8.6–10.3)
CHLORIDE SERPL-SCNC: 103 MMOL/L (ref 98–107)
CHOLEST SERPL-MCNC: 204 MG/DL (ref 0–199)
CHOLESTEROL/HDL RATIO: 3.8
CO2 SERPL-SCNC: 32 MMOL/L (ref 21–32)
CREAT SERPL-MCNC: 0.98 MG/DL (ref 0.5–1.3)
EGFRCR SERPLBLD CKD-EPI 2021: 81 ML/MIN/1.73M*2
EOSINOPHIL # BLD AUTO: 0.31 X10*3/UL (ref 0–0.4)
EOSINOPHIL NFR BLD AUTO: 7.3 %
ERYTHROCYTE [DISTWIDTH] IN BLOOD BY AUTOMATED COUNT: 12.6 % (ref 11.5–14.5)
GLUCOSE SERPL-MCNC: 91 MG/DL (ref 74–99)
HCT VFR BLD AUTO: 43.9 % (ref 41–52)
HDLC SERPL-MCNC: 54.2 MG/DL
HGB BLD-MCNC: 14.3 G/DL (ref 13.5–17.5)
IMM GRANULOCYTES # BLD AUTO: 0.01 X10*3/UL (ref 0–0.5)
IMM GRANULOCYTES NFR BLD AUTO: 0.2 % (ref 0–0.9)
LYMPHOCYTES # BLD AUTO: 1.16 X10*3/UL (ref 0.8–3)
LYMPHOCYTES NFR BLD AUTO: 27.5 %
MCH RBC QN AUTO: 30.2 PG (ref 26–34)
MCHC RBC AUTO-ENTMCNC: 32.6 G/DL (ref 32–36)
MCV RBC AUTO: 93 FL (ref 80–100)
MONOCYTES # BLD AUTO: 0.32 X10*3/UL (ref 0.05–0.8)
MONOCYTES NFR BLD AUTO: 7.6 %
NEUTROPHILS # BLD AUTO: 2.4 X10*3/UL (ref 1.6–5.5)
NEUTROPHILS NFR BLD AUTO: 56.9 %
NON-HDL CHOLESTEROL: 150 MG/DL (ref 0–149)
NRBC BLD-RTO: 0 /100 WBCS (ref 0–0)
PLATELET # BLD AUTO: 231 X10*3/UL (ref 150–450)
POTASSIUM SERPL-SCNC: 5 MMOL/L (ref 3.5–5.3)
PROT SERPL-MCNC: 6.8 G/DL (ref 6.4–8.2)
RBC # BLD AUTO: 4.74 X10*6/UL (ref 4.5–5.9)
SODIUM SERPL-SCNC: 140 MMOL/L (ref 136–145)
WBC # BLD AUTO: 4.2 X10*3/UL (ref 4.4–11.3)

## 2024-10-25 PROCEDURE — 1157F ADVNC CARE PLAN IN RCRD: CPT | Performed by: FAMILY MEDICINE

## 2024-10-25 PROCEDURE — G2211 COMPLEX E/M VISIT ADD ON: HCPCS | Performed by: FAMILY MEDICINE

## 2024-10-25 PROCEDURE — 1160F RVW MEDS BY RX/DR IN RCRD: CPT | Performed by: FAMILY MEDICINE

## 2024-10-25 PROCEDURE — 83718 ASSAY OF LIPOPROTEIN: CPT

## 2024-10-25 PROCEDURE — 99214 OFFICE O/P EST MOD 30 MIN: CPT | Performed by: FAMILY MEDICINE

## 2024-10-25 PROCEDURE — 36415 COLL VENOUS BLD VENIPUNCTURE: CPT

## 2024-10-25 PROCEDURE — 82465 ASSAY BLD/SERUM CHOLESTEROL: CPT

## 2024-10-25 PROCEDURE — 1159F MED LIST DOCD IN RCRD: CPT | Performed by: FAMILY MEDICINE

## 2024-10-25 PROCEDURE — 1036F TOBACCO NON-USER: CPT | Performed by: FAMILY MEDICINE

## 2024-10-25 PROCEDURE — 80053 COMPREHEN METABOLIC PANEL: CPT

## 2024-10-25 PROCEDURE — 85025 COMPLETE CBC W/AUTO DIFF WBC: CPT

## 2024-10-25 RX ORDER — IPRATROPIUM BROMIDE 21 UG/1
2 SPRAY, METERED NASAL 2 TIMES DAILY
Qty: 30 ML | Refills: 2 | Status: SHIPPED | OUTPATIENT
Start: 2024-10-25

## 2024-10-25 RX ORDER — BUDESONIDE AND FORMOTEROL FUMARATE DIHYDRATE 160; 4.5 UG/1; UG/1
2 AEROSOL RESPIRATORY (INHALATION) 2 TIMES DAILY
Qty: 30.6 G | Refills: 1 | Status: SHIPPED | OUTPATIENT
Start: 2024-10-25

## 2024-10-25 RX ORDER — TAMSULOSIN HYDROCHLORIDE 0.4 MG/1
0.4 CAPSULE ORAL DAILY
Qty: 90 CAPSULE | Refills: 3 | Status: SHIPPED | OUTPATIENT
Start: 2024-10-25

## 2024-10-25 ASSESSMENT — ENCOUNTER SYMPTOMS
VOMITING: 0
PALPITATIONS: 0
UNEXPECTED WEIGHT CHANGE: 0
CONSTIPATION: 0

## 2024-10-25 NOTE — ASSESSMENT & PLAN NOTE
Only when went in for gallbladder surgery  Ihsan has not detected any afib after couple months following surgery  Pt weaned himself off blood thinners

## 2024-10-28 ENCOUNTER — TELEPHONE (OUTPATIENT)
Dept: PRIMARY CARE | Facility: CLINIC | Age: 74
End: 2024-10-28
Payer: COMMERCIAL

## 2024-10-29 ENCOUNTER — TELEPHONE (OUTPATIENT)
Dept: PRIMARY CARE | Facility: CLINIC | Age: 74
End: 2024-10-29
Payer: COMMERCIAL

## 2024-11-01 ENCOUNTER — TELEPHONE (OUTPATIENT)
Dept: PRIMARY CARE | Facility: CLINIC | Age: 74
End: 2024-11-01
Payer: COMMERCIAL

## 2024-11-01 DIAGNOSIS — J45.40 MODERATE PERSISTENT ASTHMA WITHOUT COMPLICATION (HHS-HCC): Primary | ICD-10-CM

## 2024-11-01 RX ORDER — FLUTICASONE PROPIONATE AND SALMETEROL 100; 50 UG/1; UG/1
1 POWDER RESPIRATORY (INHALATION)
Qty: 60 EACH | Refills: 2 | Status: SHIPPED | OUTPATIENT
Start: 2024-11-01 | End: 2025-01-30

## 2024-11-13 ENCOUNTER — TELEPHONE (OUTPATIENT)
Dept: PRIMARY CARE | Facility: CLINIC | Age: 74
End: 2024-11-13
Payer: COMMERCIAL

## 2024-11-13 DIAGNOSIS — J45.40 MODERATE PERSISTENT ASTHMA WITHOUT COMPLICATION (HHS-HCC): ICD-10-CM

## 2024-11-13 RX ORDER — FLUTICASONE PROPIONATE AND SALMETEROL 100; 50 UG/1; UG/1
1 POWDER RESPIRATORY (INHALATION)
Qty: 60 EACH | Refills: 2 | Status: SHIPPED | OUTPATIENT
Start: 2024-11-13 | End: 2025-02-11

## 2024-11-13 NOTE — TELEPHONE ENCOUNTER
Pts wife Alaina states the Advair needs to be faxed over to [phone] ([facility]) as a 90 day fill so pt can get script for low or no cost. Please advise pts wife. [phone]

## 2024-11-18 ENCOUNTER — TELEPHONE (OUTPATIENT)
Dept: PRIMARY CARE | Facility: CLINIC | Age: 74
End: 2024-11-18
Payer: COMMERCIAL

## 2024-11-18 DIAGNOSIS — J45.40 MODERATE PERSISTENT ASTHMA WITHOUT COMPLICATION (HHS-HCC): ICD-10-CM

## 2024-11-18 RX ORDER — FLUTICASONE PROPIONATE AND SALMETEROL 100; 50 UG/1; UG/1
1 POWDER RESPIRATORY (INHALATION)
Qty: 60 EACH | Refills: 2 | Status: SHIPPED | OUTPATIENT
Start: 2024-11-18 | End: 2025-02-16

## 2024-11-18 NOTE — TELEPHONE ENCOUNTER
MEDICATION REFILL    New Pharmacy - the Rx sent earlier this month went to the incorrect pharmacy.     Pharmacy Name:     [facility] [address]   Medication requested   Fluticasone propion-salmeterol  100-50 mcg  Dosage   1 puff 2x daily   Quantity       90 days    Allergies     none given  Date of last visit   10/25/2024  Date of Next Appointment [  ]

## 2024-12-14 ENCOUNTER — TELEPHONE (OUTPATIENT)
Dept: PRIMARY CARE | Facility: CLINIC | Age: 74
End: 2024-12-14
Payer: COMMERCIAL

## 2024-12-14 DIAGNOSIS — N40.1 BPH WITH OBSTRUCTION/LOWER URINARY TRACT SYMPTOMS: ICD-10-CM

## 2024-12-14 DIAGNOSIS — N13.8 BPH WITH OBSTRUCTION/LOWER URINARY TRACT SYMPTOMS: ICD-10-CM

## 2024-12-14 NOTE — TELEPHONE ENCOUNTER
Medication Refill Request:     Patient says pharmacy doesn't have any refills    Medication: tamsulosin (Flomax) 0.4 mg 24 hr capsule  1 po every day     Qty: 90       LOV: 10/252/2024    NOV:      Pharmacy: [facility]

## 2024-12-16 RX ORDER — TAMSULOSIN HYDROCHLORIDE 0.4 MG/1
0.4 CAPSULE ORAL DAILY
Qty: 90 CAPSULE | Refills: 1 | Status: SHIPPED | OUTPATIENT
Start: 2024-12-16

## 2025-01-06 LAB — NONINV COLON CA DNA+OCC BLD SCRN STL QL: NEGATIVE

## 2025-01-07 ENCOUNTER — TELEPHONE (OUTPATIENT)
Dept: PRIMARY CARE | Facility: CLINIC | Age: 75
End: 2025-01-07
Payer: COMMERCIAL

## 2025-01-07 NOTE — TELEPHONE ENCOUNTER
Result Communication    Resulted Orders   Cologuard® colon cancer screening   Result Value Ref Range    NONINV COLON CA DNA+OCC BLD SCRN STL QL Negative Negative      Comment:      NEGATIVE TEST RESULT. A negative Cologuard result indicates a low likelihood that a colorectal cancer (CRC) or advanced adenoma (adenomatous polyps with more advanced pre-malignant features)  is present. The chance that a person with a negative Cologuard test has a colorectal cancer is less than 1 in 1500 (negative predictive value >99.9%) or has an  advanced adenoma is less than  5.3% (negative predictive value 94.7%). These data are based on a prospective cross-sectional study of 10,000 individuals at average risk for colorectal cancer who were screened with both Cologuard and colonoscopy. (Corey KRAFT. et al, N Engl J Med 2014;370(14):1574-8197) The normal value (reference range) for this assay is negative.    COLOGUARD RE-SCREENING RECOMMENDATION: Periodic colorectal cancer screening is an important part of preventive healthcare for asymptomatic individuals at average risk for colorectal cancer.  Following a negative Cologuard result, the American Cancer Society and U.S.  Multi-Society Task Force screening guidelines recommend a Cologuard re-screening interval of 3 years.   References: American Cancer Society Guideline for Colorectal Cancer Screening: https://www.cancer.org/cancer/colon-rectal-cancer/fbujxnenx-xmffthoyy-kssqwgx/acs-recommendations.html.; Velasquez VILLARREAL, Edelmira TAYLOR, Gretta BERNALK, Colorectal Cancer Screening: Recommendations for Physicians and Patients from the U.S. Multi-Society Task Force on Colorectal Cancer Screening , Am J Gastroenterology 2017; 112:5093-1998.    TEST DESCRIPTION: Composite algorithmic analysis of stool DNA-biomarkers with hemoglobin immunoassay.   Quantitative values of individual biomarkers are not reportable and are not associated with individual biomarker result reference ranges. Cologuard is intended  for colorectal cancer screening of adults of either sex, 45 years or older, who are at average-risk for colorectal cancer (CRC). Cologuard has been approved for use by the U.S. FDA. The performance of Cologuard was  established in a cross sectional study of average-risk adults aged 50-84. Cologuard performance in patients ages 45 to 49 years was estimated by sub-group analysis of near-age groups. Colonoscopies performed for a positive result may find as the most clinically significant lesion: colorectal cancer [4.0%], advanced adenoma (including sessile serrated polyps greater than or equal to 1cm diameter) [20%] or non- advanced adenoma [31%]; or no colorectal neoplasia [45%]. These estimates are derived from a prospective cross-sectional screening study of 10,000 individuals at average risk for colorectal cancer who were screened with both Cologuard and colonoscopy. (Corey Jenkins al, N Engl J Med 2014;370(14):9095-4438.) Cologuard may produce a false negative or false positive result (no colorectal cancer or precancerous polyp present at colonoscopy follow up). A negative Cologuard test result does not guarantee the absence of CRC or advanced adenoma (pre-cancer). The current Cologuard  screening interval is every 3 years. (American Cancer Society and U.S. Multi-Society Task Force). Cologuard performance data in a 10,000 patient pivotal study using colonoscopy as the reference method can be accessed at the following location: www.[employer].com/results. Additional description of the Cologuard test process, warnings and precautions can be found at www.App.net.com.         9:55 AM      Results were successfully communicated with the patient and they acknowledged their understanding.

## 2025-03-31 ENCOUNTER — APPOINTMENT (OUTPATIENT)
Dept: PRIMARY CARE | Facility: CLINIC | Age: 75
End: 2025-03-31
Payer: COMMERCIAL

## 2025-03-31 VITALS
WEIGHT: 194 LBS | SYSTOLIC BLOOD PRESSURE: 110 MMHG | HEIGHT: 67 IN | OXYGEN SATURATION: 97 % | HEART RATE: 60 BPM | DIASTOLIC BLOOD PRESSURE: 68 MMHG | BODY MASS INDEX: 30.45 KG/M2

## 2025-03-31 DIAGNOSIS — Z00.00 ROUTINE GENERAL MEDICAL EXAMINATION AT HEALTH CARE FACILITY: ICD-10-CM

## 2025-03-31 DIAGNOSIS — R53.83 FATIGUE, UNSPECIFIED TYPE: ICD-10-CM

## 2025-03-31 DIAGNOSIS — E78.2 COMBINED HYPERLIPIDEMIA: ICD-10-CM

## 2025-03-31 DIAGNOSIS — I48.20 CHRONIC ATRIAL FIBRILLATION (MULTI): Primary | ICD-10-CM

## 2025-03-31 PROCEDURE — 99214 OFFICE O/P EST MOD 30 MIN: CPT | Performed by: FAMILY MEDICINE

## 2025-03-31 PROCEDURE — 1036F TOBACCO NON-USER: CPT | Performed by: FAMILY MEDICINE

## 2025-03-31 PROCEDURE — 1123F ACP DISCUSS/DSCN MKR DOCD: CPT | Performed by: FAMILY MEDICINE

## 2025-03-31 PROCEDURE — 1159F MED LIST DOCD IN RCRD: CPT | Performed by: FAMILY MEDICINE

## 2025-03-31 PROCEDURE — 1157F ADVNC CARE PLAN IN RCRD: CPT | Performed by: FAMILY MEDICINE

## 2025-03-31 PROCEDURE — 1170F FXNL STATUS ASSESSED: CPT | Performed by: FAMILY MEDICINE

## 2025-03-31 PROCEDURE — 1160F RVW MEDS BY RX/DR IN RCRD: CPT | Performed by: FAMILY MEDICINE

## 2025-03-31 PROCEDURE — G0439 PPPS, SUBSEQ VISIT: HCPCS | Performed by: FAMILY MEDICINE

## 2025-03-31 PROCEDURE — 3008F BODY MASS INDEX DOCD: CPT | Performed by: FAMILY MEDICINE

## 2025-03-31 ASSESSMENT — ENCOUNTER SYMPTOMS
PALPITATIONS: 0
DEPRESSION: 0
LOSS OF SENSATION IN FEET: 0
CONSTIPATION: 0
VOMITING: 0
UNEXPECTED WEIGHT CHANGE: 0
OCCASIONAL FEELINGS OF UNSTEADINESS: 0

## 2025-03-31 ASSESSMENT — ACTIVITIES OF DAILY LIVING (ADL)
BATHING: INDEPENDENT
DRESSING: INDEPENDENT
GROCERY_SHOPPING: INDEPENDENT
DOING_HOUSEWORK: INDEPENDENT
MANAGING_FINANCES: INDEPENDENT
TAKING_MEDICATION: INDEPENDENT

## 2025-03-31 NOTE — PROGRESS NOTES
"Subjective   Patient ID: Agusto Siddiqi is a 74 y.o. male who presents for Medicare Annual Wellness Visit Subsequent (Agusto is here today for medicare annual wellness visit . /Pt also has c/o being off balance x3 days.).    HPI   Patient has hx of stable AF, hyperlipidemia.  Pt denies chest pain, shortness of breath and edema.  Patient's current treatment as listed in Rx.  Patient is compliant with treatment and complains of no side effects associated treatment.  Patient says his Fitbit has been picking up episodes of A-fib about every 2 weeks and it is increasing in frequency and also he will occasionally while resting watching television have a heart rate of up to 185 per his Fitbit  He is asymptomatic during all these episodes    Review of Systems   Constitutional:  Negative for unexpected weight change.   HENT:  Negative for congestion and ear discharge.    Cardiovascular:  Negative for chest pain and palpitations.   Gastrointestinal:  Negative for constipation and vomiting.   All other systems reviewed and are negative.      Objective   /68 (BP Location: Left arm, Patient Position: Sitting)   Pulse 60   Ht 1.702 m (5' 7\")   Wt 88 kg (194 lb)   SpO2 97%   BMI 30.38 kg/m²     Physical Exam  HENT:      Head: Normocephalic and atraumatic.      Nose: Nose normal.      Mouth/Throat:      Mouth: Mucous membranes are moist.      Pharynx: No oropharyngeal exudate.   Eyes:      Extraocular Movements: Extraocular movements intact.      Conjunctiva/sclera: Conjunctivae normal.      Pupils: Pupils are equal, round, and reactive to light.   Cardiovascular:      Rate and Rhythm: Normal rate and regular rhythm.   Pulmonary:      Effort: Pulmonary effort is normal.      Breath sounds: Normal breath sounds.   Abdominal:      General: There is no distension.      Palpations: Abdomen is soft.   Musculoskeletal:      Cervical back: Normal range of motion and neck supple.   Lymphadenopathy:      Cervical: No cervical " adenopathy.   Neurological:      General: No focal deficit present.      Mental Status: He is alert.   Psychiatric:         Attention and Perception: Attention normal.         Speech: Speech normal.         Behavior: Behavior is cooperative.         Assessment/Plan   Problem List Items Addressed This Visit             ICD-10-CM    Combined hyperlipidemia E78.2    Relevant Orders    CBC and Auto Differential    Comprehensive Metabolic Panel    Thyroid Stimulating Hormone    Thyroxine, Free    Lipid Panel Non-Fasting    Atrial fibrillation (Multi) - Primary I48.91    Relevant Orders    Referral to Cardiology    CBC and Auto Differential    Comprehensive Metabolic Panel    Thyroid Stimulating Hormone    Thyroxine, Free    Lipid Panel Non-Fasting     Other Visit Diagnoses         Codes    Fatigue, unspecified type     R53.83    Relevant Orders    CBC and Auto Differential    Comprehensive Metabolic Panel    Thyroid Stimulating Hormone    Thyroxine, Free    Lipid Panel Non-Fasting    Routine general medical examination at health care facility     Z00.00    Relevant Orders    1 Year Follow Up In Primary Care - Wellness Exam         FAITH discussed  Will refer patient back to cardiology as it sounds like he may be having more of an increased burden of atrial fibrillation  Check labs  Recheck here 6 months sooner if any issues arise

## 2025-04-01 LAB
ALBUMIN SERPL-MCNC: 4.3 G/DL (ref 3.6–5.1)
ALP SERPL-CCNC: 59 U/L (ref 35–144)
ALT SERPL-CCNC: 15 U/L (ref 9–46)
ANION GAP SERPL CALCULATED.4IONS-SCNC: 8 MMOL/L (CALC) (ref 7–17)
AST SERPL-CCNC: 18 U/L (ref 10–35)
BASOPHILS # BLD AUTO: 19 CELLS/UL (ref 0–200)
BASOPHILS NFR BLD AUTO: 0.4 %
BILIRUB SERPL-MCNC: 0.6 MG/DL (ref 0.2–1.2)
BUN SERPL-MCNC: 16 MG/DL (ref 7–25)
CALCIUM SERPL-MCNC: 9.1 MG/DL (ref 8.6–10.3)
CHLORIDE SERPL-SCNC: 104 MMOL/L (ref 98–110)
CHOLEST SERPL-MCNC: 202 MG/DL
CHOLEST/HDLC SERPL: 4.1 (CALC)
CO2 SERPL-SCNC: 28 MMOL/L (ref 20–32)
CREAT SERPL-MCNC: 1.07 MG/DL (ref 0.7–1.28)
EGFRCR SERPLBLD CKD-EPI 2021: 73 ML/MIN/1.73M2
EOSINOPHIL # BLD AUTO: 211 CELLS/UL (ref 15–500)
EOSINOPHIL NFR BLD AUTO: 4.4 %
ERYTHROCYTE [DISTWIDTH] IN BLOOD BY AUTOMATED COUNT: 13.1 % (ref 11–15)
GLUCOSE SERPL-MCNC: 86 MG/DL (ref 65–139)
HCT VFR BLD AUTO: 42.9 % (ref 38.5–50)
HDLC SERPL-MCNC: 49 MG/DL
HGB BLD-MCNC: 14.2 G/DL (ref 13.2–17.1)
LDLC SERPL CALC-MCNC: 127 MG/DL (CALC)
LYMPHOCYTES # BLD AUTO: 1234 CELLS/UL (ref 850–3900)
LYMPHOCYTES NFR BLD AUTO: 25.7 %
MCH RBC QN AUTO: 30.9 PG (ref 27–33)
MCHC RBC AUTO-ENTMCNC: 33.1 G/DL (ref 32–36)
MCV RBC AUTO: 93.5 FL (ref 80–100)
MONOCYTES # BLD AUTO: 437 CELLS/UL (ref 200–950)
MONOCYTES NFR BLD AUTO: 9.1 %
NEUTROPHILS # BLD AUTO: 2899 CELLS/UL (ref 1500–7800)
NEUTROPHILS NFR BLD AUTO: 60.4 %
NONHDLC SERPL-MCNC: 153 MG/DL (CALC)
PLATELET # BLD AUTO: 222 THOUSAND/UL (ref 140–400)
PMV BLD REES-ECKER: 11 FL (ref 7.5–12.5)
POTASSIUM SERPL-SCNC: 4.2 MMOL/L (ref 3.5–5.3)
PROT SERPL-MCNC: 6.8 G/DL (ref 6.1–8.1)
RBC # BLD AUTO: 4.59 MILLION/UL (ref 4.2–5.8)
SODIUM SERPL-SCNC: 140 MMOL/L (ref 135–146)
T4 FREE SERPL-MCNC: 1 NG/DL (ref 0.8–1.8)
TRIGL SERPL-MCNC: 151 MG/DL
TSH SERPL-ACNC: 1.34 MIU/L (ref 0.4–4.5)
WBC # BLD AUTO: 4.8 THOUSAND/UL (ref 3.8–10.8)

## 2025-04-09 NOTE — PROGRESS NOTES
[facility] Heart and Vascular Electrophysiology    Patient Name: Santy Siddiqi  Patient : 1950    Referred for  Afib    History of Present Illness:  Santy Siddiqi is a 74 y.o. year old male patient with:    Afib:  Pt weaned himself off Eliquis in 2024 along with diltiazem. (Per 2024 note from Gage Hobbs MD).  The patient is s/p lap bertrand for gangrenous acute cholecystitis (10/29/23) presenting with post op (new onset) afib s/p DCCV (10/31/2023) followed by recurrence.   HLD  Asthma  GERD  HTN    Patient did not follow-up after I saw him back in .  After that, he decided to stop his Eliquis and diltiazem.  He comes in today stating that he has been seeing episodes of A-fib through his watch.  He has very occasional fluttering.  His EKG today shows sinus rhythm with PACs.    Past Medical History:  He has a past medical history of Acute bronchitis (2022), Asthma, Hypertension, Inadequate sleep hygiene, Overactive bladder, Personal history of colonic polyps, Personal history of other diseases of male genital organs, Personal history of other diseases of the digestive system, Personal history of other diseases of the digestive system, Personal history of other diseases of the musculoskeletal system and connective tissue, Personal history of other specified conditions, and Wheezing (2022).    Past Surgical History:  He has a past surgical history that includes Nose surgery (2017); Colonoscopy (2017); Hernia repair; and Cardiac electrophysiology procedure (N/A, 10/31/2023).      Social History:  He reports that he has quit smoking. His smoking use included cigarettes. He has never used smokeless tobacco. He reports that he does not currently use alcohol. He reports that he does not use drugs.    Family History:  Family History   Problem Relation Name Age of Onset    Pancreatic cancer Mother      Cirrhosis Father          Allergies:  Patient has no known  allergies.    Outpatient Medications:  Current Outpatient Medications   Medication Instructions    fluticasone propion-salmeteroL (Advair Diskus) 100-50 mcg/dose diskus inhaler 1 puff, inhalation, 2 times daily RT, Rinse mouth with water after use to reduce aftertaste and incidence of candidiasis. Do not swallow.    ipratropium (Atrovent) 21 mcg (0.03 %) nasal spray 2 sprays, Each Nostril, 2 times daily    tamsulosin (FLOMAX) 0.4 mg, oral, Daily        ROS:  A 14 point review of systems was done and is negative other than as stated in HPI    Physical Exam  Constitutional:       Appearance: Normal appearance.   Cardiovascular:      Rate and Rhythm: Normal rate and regular rhythm. Extrasystoles are present.     Heart sounds: No murmur heard.     No friction rub. No gallop.   Pulmonary:      Effort: Pulmonary effort is normal.      Breath sounds: Normal breath sounds.   Abdominal:      Palpations: Abdomen is soft.   Musculoskeletal:      Cervical back: Neck supple.   Neurological:      Mental Status: He is alert.   Psychiatric:         Mood and Affect: Mood normal.         Behavior: Behavior normal.         Vitals:  There were no vitals taken for this visit.       Labs:   CBC  Lab Results   Component Value Date    WBC 4.8 03/31/2025    HGB 14.2 03/31/2025    HCT 42.9 03/31/2025    MCV 93.5 03/31/2025     03/31/2025        Renal Function Panel  Lab Results   Component Value Date    GLUCOSE 86 03/31/2025     03/31/2025    K 4.2 03/31/2025     03/31/2025    CO2 28 03/31/2025    ANIONGAP 8 03/31/2025    BUN 16 03/31/2025    CREATININE 1.07 03/31/2025    GFRMALE 78 02/21/2023    CALCIUM 9.1 03/31/2025        CMP  Lab Results   Component Value Date    CALCIUM 9.1 03/31/2025    PHOS 1.9 (L) 10/31/2023    PROT 6.8 03/31/2025    ALBUMIN 4.3 03/31/2025    AST 18 03/31/2025    ALT 15 03/31/2025    ALKPHOS 59 03/31/2025    BILITOT 0.6 03/31/2025       TSH  Lab Results   Component Value Date    TSH 1.34  03/31/2025    FREET4 1.0 03/31/2025          Cardiac Testing:  ECG  04/14/2025  Sinus rhythm, LAFB, PACs, HR 68 bpm.    Echocardiogram  10/31/2023  PHYSICIAN INTERPRETATION:  NICOLE Details: The NICOLE probe used was Yris X8. Technically adequate omniplane transesophageal echocardiogram performed. Color flow Doppler echo was performed to assess for the presence of a patent foramen ovale.  NICOLE Medication: The pharynx was anesthetized with Cetacaine spray and viscous lidocaine. The patient was sedated by Anesthesia; please refer to anesthesia flow sheet for medications used. Total intraservice time for moderate sedation was 20 minutes.  NICOLE Procedure: The probe was passed without difficulty. The patient tolerated the procedure well without any apparent complications.  Left Ventricle: The left ventricular systolic function is normal. There are no regional wall motion abnormalities. The left ventricular cavity size is normal. Left ventricular diastolic filling was not assessed.  Left Atrium: The left atrium is upper limits of normal in size. There is no definite left atrial thrombus present. There is a normal sized left atrial appendage.  Right Ventricle: The right ventricle is normal in size. There is normal right ventricular global systolic function.  Right Atrium: The right atrium is normal in size.  Aortic Valve: The aortic valve is trileaflet. There is trivial aortic valve regurgitation.  Mitral Valve: The mitral valve is mildly thickened. There is mild mitral valve regurgitation.  Tricuspid Valve: The tricuspid valve is structurally normal. There is trace tricuspid regurgitation.  Pulmonic Valve: The pulmonic valve is structurally normal. There is trace pulmonic valve regurgitation.  Pericardium: There is no pericardial effusion noted.  Aorta: The aortic root is normal.        CONCLUSIONS:   1. Left ventricular systolic function is normal.   2. No left atrial thrombus.        Assessment:     Patient did not follow-up  after I saw him back in 2023.  After that, he decided to stop his Eliquis and diltiazem.  He comes in today stating that he has been seeing episodes of A-fib through his watch.  He has very occasional fluttering.  His EKG today shows sinus rhythm with PACs.    Plan:  Will order a holter monitor and schedule a follow up.

## 2025-04-14 ENCOUNTER — OFFICE VISIT (OUTPATIENT)
Dept: CARDIOLOGY | Facility: HOSPITAL | Age: 75
End: 2025-04-14
Payer: COMMERCIAL

## 2025-04-14 VITALS
OXYGEN SATURATION: 97 % | WEIGHT: 194.45 LBS | SYSTOLIC BLOOD PRESSURE: 130 MMHG | DIASTOLIC BLOOD PRESSURE: 85 MMHG | BODY MASS INDEX: 30.45 KG/M2 | HEART RATE: 56 BPM

## 2025-04-14 DIAGNOSIS — Z86.79 HISTORY OF ATRIAL FIBRILLATION: Primary | ICD-10-CM

## 2025-04-14 DIAGNOSIS — I49.1 PAC (PREMATURE ATRIAL CONTRACTION): ICD-10-CM

## 2025-04-14 LAB
ATRIAL RATE: 68 BPM
P AXIS: 10 DEGREES
P OFFSET: 199 MS
P ONSET: 148 MS
PR INTERVAL: 132 MS
Q ONSET: 214 MS
QRS COUNT: 11 BEATS
QRS DURATION: 74 MS
QT INTERVAL: 378 MS
QTC CALCULATION(BAZETT): 401 MS
QTC FREDERICIA: 394 MS
R AXIS: -37 DEGREES
T AXIS: 31 DEGREES
T OFFSET: 403 MS
VENTRICULAR RATE: 68 BPM

## 2025-04-14 PROCEDURE — 99214 OFFICE O/P EST MOD 30 MIN: CPT | Mod: 25 | Performed by: STUDENT IN AN ORGANIZED HEALTH CARE EDUCATION/TRAINING PROGRAM

## 2025-04-14 PROCEDURE — 1159F MED LIST DOCD IN RCRD: CPT | Performed by: STUDENT IN AN ORGANIZED HEALTH CARE EDUCATION/TRAINING PROGRAM

## 2025-04-14 PROCEDURE — 1123F ACP DISCUSS/DSCN MKR DOCD: CPT | Performed by: STUDENT IN AN ORGANIZED HEALTH CARE EDUCATION/TRAINING PROGRAM

## 2025-04-14 PROCEDURE — 1157F ADVNC CARE PLAN IN RCRD: CPT | Performed by: STUDENT IN AN ORGANIZED HEALTH CARE EDUCATION/TRAINING PROGRAM

## 2025-04-14 PROCEDURE — 93005 ELECTROCARDIOGRAM TRACING: CPT | Performed by: STUDENT IN AN ORGANIZED HEALTH CARE EDUCATION/TRAINING PROGRAM

## 2025-04-14 PROCEDURE — 99214 OFFICE O/P EST MOD 30 MIN: CPT | Performed by: STUDENT IN AN ORGANIZED HEALTH CARE EDUCATION/TRAINING PROGRAM

## 2025-04-14 PROCEDURE — 1036F TOBACCO NON-USER: CPT | Performed by: STUDENT IN AN ORGANIZED HEALTH CARE EDUCATION/TRAINING PROGRAM

## 2025-05-06 ENCOUNTER — HOSPITAL ENCOUNTER (OUTPATIENT)
Dept: CARDIOLOGY | Facility: HOSPITAL | Age: 75
Discharge: HOME | End: 2025-05-06
Payer: COMMERCIAL

## 2025-05-06 DIAGNOSIS — I48.91 NEW ONSET A-FIB (MULTI): ICD-10-CM

## 2025-05-06 PROCEDURE — 93246 EXT ECG>7D<15D RECORDING: CPT

## 2025-05-24 DIAGNOSIS — N40.1 BPH WITH OBSTRUCTION/LOWER URINARY TRACT SYMPTOMS: ICD-10-CM

## 2025-05-24 DIAGNOSIS — N13.8 BPH WITH OBSTRUCTION/LOWER URINARY TRACT SYMPTOMS: ICD-10-CM

## 2025-05-27 RX ORDER — TAMSULOSIN HYDROCHLORIDE 0.4 MG/1
0.4 CAPSULE ORAL DAILY
Qty: 90 CAPSULE | Refills: 0 | Status: SHIPPED | OUTPATIENT
Start: 2025-05-27

## 2025-06-06 NOTE — PROGRESS NOTES
[facility] Heart and Vascular Electrophysiology    Patient Name: Santy Siddiqi  Patient : 1950    Referred for  Afib    History of Present Illness:  Santy Siddiqi is a 74 y.o. year old male patient with:    Afib:  Pt weaned himself off Eliquis in 2024 along with diltiazem. (Per 2024 note from Gage Hobbs MD).    gangrenous acute cholecystitis s/p lap bertrand (10/29/23) c/b post op (new onset) afib s/p DCCV (10/31/2023) followed by recurrence. Zio monitor shows 4% burden of Afib.  HLD  Asthma  GERD  HTN    I saw the patient back in . At that time, he reported that he had occasional palpitations for the past 10 years. Denied CP, SOB, fatigue, near syncope. After that, he decided to stop his Eliquis and diltiazem. Patient did not follow-up after until 2025. He came in reporting that he has been seeing episodes of A-fib through his watch. He has very occasional fluttering. His EKG showed sinus rhythm with PACs. He is here today for a follow up. He had an event monitor in May 2025 showing 4% burden of symptomatic atrial fibrillation with average rates of 149 bpm ( bpm).    Past Medical History:  He has a past medical history of Acute bronchitis (2022), Asthma, Hypertension, Inadequate sleep hygiene, Overactive bladder, Personal history of colonic polyps, Personal history of other diseases of male genital organs, Personal history of other diseases of the digestive system, Personal history of other diseases of the digestive system, Personal history of other diseases of the musculoskeletal system and connective tissue, Personal history of other specified conditions, and Wheezing (2022).    Past Surgical History:  He has a past surgical history that includes Nose surgery (2017); Colonoscopy (2017); Hernia repair; and Cardiac electrophysiology procedure (N/A, 10/31/2023).      Social History:  He reports that he has quit smoking. His smoking use  included cigarettes. He has never used smokeless tobacco. He reports that he does not currently use alcohol. He reports that he does not use drugs.    Family History:  Family History[1]     Allergies:  Patient has no known allergies.    Outpatient Medications:  Current Outpatient Medications   Medication Instructions    fluticasone propion-salmeteroL (Advair Diskus) 100-50 mcg/dose diskus inhaler 1 puff, inhalation, 2 times daily RT, Rinse mouth with water after use to reduce aftertaste and incidence of candidiasis. Do not swallow.    ipratropium (Atrovent) 21 mcg (0.03 %) nasal spray 2 sprays, Each Nostril, 2 times daily    tamsulosin (FLOMAX) 0.4 mg, oral, Daily        ROS:  A 14 point review of systems was done and is negative other than as stated in HPI    Physical Exam  Constitutional:       Appearance: Normal appearance.   Cardiovascular:      Rate and Rhythm: Normal rate and regular rhythm.      Heart sounds: No murmur heard.     No friction rub. No gallop.   Pulmonary:      Effort: Pulmonary effort is normal.      Breath sounds: Normal breath sounds.   Abdominal:      Palpations: Abdomen is soft.   Musculoskeletal:      Cervical back: Neck supple.   Neurological:      Mental Status: He is alert.   Psychiatric:         Mood and Affect: Mood normal.         Behavior: Behavior normal.       Vitals:  There were no vitals taken for this visit.       Labs:   CBC  Lab Results   Component Value Date    WBC 4.8 03/31/2025    HGB 14.2 03/31/2025    HCT 42.9 03/31/2025    MCV 93.5 03/31/2025     03/31/2025        Renal Function Panel  Lab Results   Component Value Date    GLUCOSE 86 03/31/2025     03/31/2025    K 4.2 03/31/2025     03/31/2025    CO2 28 03/31/2025    ANIONGAP 8 03/31/2025    BUN 16 03/31/2025    CREATININE 1.07 03/31/2025    GFRMALE 78 02/21/2023    CALCIUM 9.1 03/31/2025        CMP  Lab Results   Component Value Date    CALCIUM 9.1 03/31/2025    PHOS 1.9 (L) 10/31/2023    PROT 6.8  03/31/2025    ALBUMIN 4.3 03/31/2025    AST 18 03/31/2025    ALT 15 03/31/2025    ALKPHOS 59 03/31/2025    BILITOT 0.6 03/31/2025       TSH  Lab Results   Component Value Date    TSH 1.34 03/31/2025    FREET4 1.0 03/31/2025          Cardiac Testing:    Echocardiogram  10/31/2023  PHYSICIAN INTERPRETATION:  NICOLE Details: The NICOLE probe used was Yris X8. Technically adequate omniplane transesophageal echocardiogram performed. Color flow Doppler echo was performed to assess for the presence of a patent foramen ovale.  NICOLE Medication: The pharynx was anesthetized with Cetacaine spray and viscous lidocaine. The patient was sedated by Anesthesia; please refer to anesthesia flow sheet for medications used. Total intraservice time for moderate sedation was 20 minutes.  NICOLE Procedure: The probe was passed without difficulty. The patient tolerated the procedure well without any apparent complications.  Left Ventricle: The left ventricular systolic function is normal. There are no regional wall motion abnormalities. The left ventricular cavity size is normal. Left ventricular diastolic filling was not assessed.  Left Atrium: The left atrium is upper limits of normal in size. There is no definite left atrial thrombus present. There is a normal sized left atrial appendage.  Right Ventricle: The right ventricle is normal in size. There is normal right ventricular global systolic function.  Right Atrium: The right atrium is normal in size.  Aortic Valve: The aortic valve is trileaflet. There is trivial aortic valve regurgitation.  Mitral Valve: The mitral valve is mildly thickened. There is mild mitral valve regurgitation.  Tricuspid Valve: The tricuspid valve is structurally normal. There is trace tricuspid regurgitation.  Pulmonic Valve: The pulmonic valve is structurally normal. There is trace pulmonic valve regurgitation.  Pericardium: There is no pericardial effusion noted.  Aorta: The aortic root is normal.         CONCLUSIONS:   1. Left ventricular systolic function is normal.   2. No left atrial thrombus.    Cardiac Holter Monitor  05/06/2025-05/20/2025  Patient had a min HR of 41 bpm, max HR of 198 bpm, and avg HR of 69 bpm.  Predominant underlying rhythm was Sinus Rhythm. Atrial Fibrillation/Flutter  occurred (4% burden), ranging from  bpm (avg of 149 bpm), the longest  lasting 1 hour 51 mins with an avg rate of 132 bpm. Atrial Flutter may be possible  Atrial Tachycardia with variable block. Atrial Fibrillation/Flutter was detected within  +/- 45 seconds of symptomatic patient event(s). Isolated SVEs were frequent (8.2%,  682577), SVE Couplets were occasional (2.6%, 72615), and SVE Triplets were rare  (<1.0%, 2878). Isolated VEs were rare (<1.0%), VE Couplets were rare (<1.0%), and  no VE Triplets were present.        Assessment/Plan:  I saw the patient back in 2023. At that time, he reported that he had occasional palpitations for the past 10 years. Denied CP, SOB, fatigue, near syncope. After that, he decided to stop his Eliquis and diltiazem. Patient did not follow-up after until April 2025. He came in reporting that he has been seeing episodes of A-fib through his watch. He has very occasional fluttering. His EKG showed sinus rhythm with PACs. He is here today for a follow up. He had an event monitor in May 2025 showing 4% burden of symptomatic atrial fibrillation with average rates of 149 bpm ( bpm).     Will initiate Eliquis, Multaq (if cost allows). I placed a referral for clinical pharmacy.    I had a long discussion with the patient about atrial fibrillation and treatment options, including rate control and rhythm control with medications and catheter ablation. I explained the risks, benefits and procedure details of catheter ablation of atrial fibrillation. The risks include, but are not limited to bleeding, infection, and pain from the catheter insertion, damage to the blood vessels from the  catheter, puncture to the heart, blood clots, which might lead to a stroke, narrowing of the pulmonary veins, radiation exposure and death. Success rate of the procedure is usually between 50-85%, depending on several factors including patient's comorbidities and type/duration of atrial fibrillation. The patient would like to proceed with catheter ablation for atrial fibrillation. Will schedule. Will plan to stop his Multaq (if initiated) a few months after his procedure.         [1]   Family History  Problem Relation Name Age of Onset    Pancreatic cancer Mother      Cirrhosis Father

## 2025-06-09 ENCOUNTER — OFFICE VISIT (OUTPATIENT)
Dept: CARDIOLOGY | Facility: HOSPITAL | Age: 75
End: 2025-06-09
Payer: COMMERCIAL

## 2025-06-09 VITALS
DIASTOLIC BLOOD PRESSURE: 65 MMHG | SYSTOLIC BLOOD PRESSURE: 112 MMHG | WEIGHT: 191.14 LBS | BODY MASS INDEX: 29.94 KG/M2 | OXYGEN SATURATION: 95 % | HEART RATE: 88 BPM

## 2025-06-09 DIAGNOSIS — I48.0 PAROXYSMAL ATRIAL FIBRILLATION (MULTI): Primary | ICD-10-CM

## 2025-06-09 PROCEDURE — 99212 OFFICE O/P EST SF 10 MIN: CPT

## 2025-06-09 PROCEDURE — 1036F TOBACCO NON-USER: CPT | Performed by: STUDENT IN AN ORGANIZED HEALTH CARE EDUCATION/TRAINING PROGRAM

## 2025-06-09 PROCEDURE — 1159F MED LIST DOCD IN RCRD: CPT | Performed by: STUDENT IN AN ORGANIZED HEALTH CARE EDUCATION/TRAINING PROGRAM

## 2025-06-09 PROCEDURE — 99214 OFFICE O/P EST MOD 30 MIN: CPT | Performed by: STUDENT IN AN ORGANIZED HEALTH CARE EDUCATION/TRAINING PROGRAM

## 2025-06-10 ENCOUNTER — TELEPHONE (OUTPATIENT)
Facility: CLINIC | Age: 75
End: 2025-06-10
Payer: COMMERCIAL

## 2025-06-11 ENCOUNTER — TELEPHONE (OUTPATIENT)
Facility: CLINIC | Age: 75
End: 2025-06-11
Payer: COMMERCIAL

## 2025-06-11 DIAGNOSIS — I48.91 ATRIAL FIBRILLATION, UNSPECIFIED TYPE (MULTI): ICD-10-CM

## 2025-06-12 DIAGNOSIS — I48.0 PAROXYSMAL ATRIAL FIBRILLATION (MULTI): ICD-10-CM

## 2025-06-12 RX ORDER — AMIODARONE HYDROCHLORIDE 200 MG/1
200 TABLET ORAL DAILY
Qty: 30 TABLET | Refills: 11 | Status: SHIPPED | OUTPATIENT
Start: 2025-06-12 | End: 2026-06-12

## 2025-07-11 NOTE — TELEPHONE ENCOUNTER
Patient c/o left knee pain and swelling. Has a history of RA.   Patient wife left  requesting a call back to schedule ablation procedure. Patient can be reached at [phone]

## 2025-08-15 ENCOUNTER — PRE-ADMISSION TESTING (OUTPATIENT)
Dept: PREADMISSION TESTING | Facility: HOSPITAL | Age: 75
End: 2025-08-15
Payer: COMMERCIAL

## 2025-08-15 ENCOUNTER — APPOINTMENT (OUTPATIENT)
Facility: HOSPITAL | Age: 75
End: 2025-08-15
Payer: COMMERCIAL

## 2025-08-15 VITALS
HEART RATE: 53 BPM | WEIGHT: 190.04 LBS | HEIGHT: 67 IN | OXYGEN SATURATION: 98 % | BODY MASS INDEX: 29.83 KG/M2 | RESPIRATION RATE: 16 BRPM | SYSTOLIC BLOOD PRESSURE: 138 MMHG | TEMPERATURE: 96.8 F | DIASTOLIC BLOOD PRESSURE: 72 MMHG

## 2025-08-15 DIAGNOSIS — Z01.818 PRE-OP EXAMINATION: Primary | ICD-10-CM

## 2025-08-15 LAB
ABO GROUP (TYPE) IN BLOOD: NORMAL
ANION GAP SERPL CALCULATED.3IONS-SCNC: 9 MMOL/L (ref 10–20)
ANTIBODY SCREEN: NORMAL
BASOPHILS # BLD AUTO: 0.02 X10*3/UL (ref 0–0.1)
BASOPHILS NFR BLD AUTO: 0.5 %
BUN SERPL-MCNC: 19 MG/DL (ref 6–23)
CALCIUM SERPL-MCNC: 9.5 MG/DL (ref 8.6–10.3)
CHLORIDE SERPL-SCNC: 103 MMOL/L (ref 98–107)
CO2 SERPL-SCNC: 32 MMOL/L (ref 21–32)
CREAT SERPL-MCNC: 1.14 MG/DL (ref 0.5–1.3)
EGFRCR SERPLBLD CKD-EPI 2021: 67 ML/MIN/1.73M*2
EOSINOPHIL # BLD AUTO: 0.22 X10*3/UL (ref 0–0.4)
EOSINOPHIL NFR BLD AUTO: 5.7 %
ERYTHROCYTE [DISTWIDTH] IN BLOOD BY AUTOMATED COUNT: 13.1 % (ref 11.5–14.5)
GLUCOSE SERPL-MCNC: 87 MG/DL (ref 74–99)
HCT VFR BLD AUTO: 45.1 % (ref 41–52)
HGB BLD-MCNC: 15.1 G/DL (ref 13.5–17.5)
IMM GRANULOCYTES # BLD AUTO: 0.01 X10*3/UL (ref 0–0.5)
IMM GRANULOCYTES NFR BLD AUTO: 0.3 % (ref 0–0.9)
LYMPHOCYTES # BLD AUTO: 0.91 X10*3/UL (ref 0.8–3)
LYMPHOCYTES NFR BLD AUTO: 23.5 %
MCH RBC QN AUTO: 31.1 PG (ref 26–34)
MCHC RBC AUTO-ENTMCNC: 33.5 G/DL (ref 32–36)
MCV RBC AUTO: 93 FL (ref 80–100)
MONOCYTES # BLD AUTO: 0.38 X10*3/UL (ref 0.05–0.8)
MONOCYTES NFR BLD AUTO: 9.8 %
NEUTROPHILS # BLD AUTO: 2.34 X10*3/UL (ref 1.6–5.5)
NEUTROPHILS NFR BLD AUTO: 60.2 %
NRBC BLD-RTO: 0 /100 WBCS (ref 0–0)
PLATELET # BLD AUTO: 216 X10*3/UL (ref 150–450)
POTASSIUM SERPL-SCNC: 5.4 MMOL/L (ref 3.5–5.3)
RBC # BLD AUTO: 4.85 X10*6/UL (ref 4.5–5.9)
RH FACTOR (ANTIGEN D): NORMAL
SODIUM SERPL-SCNC: 139 MMOL/L (ref 136–145)
WBC # BLD AUTO: 3.9 X10*3/UL (ref 4.4–11.3)

## 2025-08-15 PROCEDURE — 86850 RBC ANTIBODY SCREEN: CPT

## 2025-08-15 PROCEDURE — 85025 COMPLETE CBC W/AUTO DIFF WBC: CPT

## 2025-08-15 PROCEDURE — 99204 OFFICE O/P NEW MOD 45 MIN: CPT

## 2025-08-15 PROCEDURE — 80048 BASIC METABOLIC PNL TOTAL CA: CPT

## 2025-08-15 PROCEDURE — 36415 COLL VENOUS BLD VENIPUNCTURE: CPT

## 2025-08-15 ASSESSMENT — DUKE ACTIVITY SCORE INDEX (DASI)
CAN YOU TAKE CARE OF YOURSELF (EAT, DRESS, BATHE, OR USE TOILET): YES
CAN YOU CLIMB A FLIGHT OF STAIRS OR WALK UP A HILL: YES
CAN YOU RUN A SHORT DISTANCE: YES
CAN YOU DO MODERATE WORK AROUND THE HOUSE LIKE VACUUMING, SWEEPING FLOORS OR CARRYING GROCERIES: YES
DASI METS SCORE: 9
CAN YOU PARTICIPATE IN MODERATE RECREATIONAL ACTIVITIES LIKE GOLF, BOWLING, DANCING, DOUBLES TENNIS OR THROWING A BASEBALL OR FOOTBALL: YES
CAN YOU WALK INDOORS, SUCH AS AROUND YOUR HOUSE: YES
CAN YOU PARTICIPATE IN STRENOUS SPORTS LIKE SWIMMING, SINGLES TENNIS, FOOTBALL, BASKETBALL, OR SKIING: NO
TOTAL_SCORE: 50.7
CAN YOU DO YARD WORK LIKE RAKING LEAVES, WEEDING OR PUSHING A MOWER: YES
CAN YOU DO LIGHT WORK AROUND THE HOUSE LIKE DUSTING OR WASHING DISHES: YES
CAN YOU WALK A BLOCK OR TWO ON LEVEL GROUND: YES
CAN YOU DO HEAVY WORK AROUND THE HOUSE LIKE SCRUBBING FLOORS OR LIFTING AND MOVING HEAVY FURNITURE: YES
CAN YOU HAVE SEXUAL RELATIONS: YES

## 2025-08-15 ASSESSMENT — PAIN - FUNCTIONAL ASSESSMENT: PAIN_FUNCTIONAL_ASSESSMENT: 0-10

## 2025-08-15 ASSESSMENT — ENCOUNTER SYMPTOMS
GASTROINTESTINAL NEGATIVE: 1
NEUROLOGICAL NEGATIVE: 1
CONSTITUTIONAL NEGATIVE: 1
ENDOCRINE NEGATIVE: 1
RESPIRATORY NEGATIVE: 1
ALLERGIC/IMMUNOLOGIC NEGATIVE: 1
CARDIOVASCULAR NEGATIVE: 1
EYES NEGATIVE: 1
MUSCULOSKELETAL NEGATIVE: 1
HEMATOLOGIC/LYMPHATIC NEGATIVE: 1
PSYCHIATRIC NEGATIVE: 1

## 2025-08-15 ASSESSMENT — PAIN SCALES - GENERAL: PAINLEVEL_OUTOF10: 0 - NO PAIN

## 2025-08-22 ENCOUNTER — ANESTHESIA EVENT (OUTPATIENT)
Dept: CARDIOLOGY | Facility: HOSPITAL | Age: 75
End: 2025-08-22
Payer: COMMERCIAL

## 2025-08-22 ENCOUNTER — ANESTHESIA (OUTPATIENT)
Dept: CARDIOLOGY | Facility: HOSPITAL | Age: 75
End: 2025-08-22
Payer: COMMERCIAL

## 2025-08-22 ENCOUNTER — APPOINTMENT (OUTPATIENT)
Dept: CARDIOLOGY | Facility: HOSPITAL | Age: 75
End: 2025-08-22
Payer: COMMERCIAL

## 2025-08-22 ENCOUNTER — HOSPITAL ENCOUNTER (OUTPATIENT)
Facility: HOSPITAL | Age: 75
Setting detail: OUTPATIENT SURGERY
Discharge: HOME | End: 2025-08-22
Attending: STUDENT IN AN ORGANIZED HEALTH CARE EDUCATION/TRAINING PROGRAM | Admitting: STUDENT IN AN ORGANIZED HEALTH CARE EDUCATION/TRAINING PROGRAM
Payer: COMMERCIAL

## 2025-08-22 VITALS
SYSTOLIC BLOOD PRESSURE: 116 MMHG | DIASTOLIC BLOOD PRESSURE: 64 MMHG | TEMPERATURE: 96.6 F | OXYGEN SATURATION: 93 % | WEIGHT: 190 LBS | HEART RATE: 58 BPM | HEIGHT: 67 IN | RESPIRATION RATE: 16 BRPM | BODY MASS INDEX: 29.82 KG/M2

## 2025-08-22 DIAGNOSIS — I48.0 PAROXYSMAL ATRIAL FIBRILLATION (MULTI): Primary | ICD-10-CM

## 2025-08-22 PROBLEM — M51.9 LUMBAR DISC DISEASE: Status: ACTIVE | Noted: 2025-08-22

## 2025-08-22 PROBLEM — E66.9 OBESITY: Status: ACTIVE | Noted: 2025-08-22

## 2025-08-22 LAB
ABO GROUP (TYPE) IN BLOOD: NORMAL
ANION GAP SERPL CALCULATED.3IONS-SCNC: 10 MMOL/L (ref 10–20)
ATRIAL RATE: 300 BPM
BUN SERPL-MCNC: 18 MG/DL (ref 6–23)
CALCIUM SERPL-MCNC: 9.1 MG/DL (ref 8.6–10.3)
CHLORIDE SERPL-SCNC: 104 MMOL/L (ref 98–107)
CO2 SERPL-SCNC: 28 MMOL/L (ref 21–32)
CREAT SERPL-MCNC: 1.09 MG/DL (ref 0.5–1.3)
EGFRCR SERPLBLD CKD-EPI 2021: 71 ML/MIN/1.73M*2
GLUCOSE SERPL-MCNC: 97 MG/DL (ref 74–99)
POTASSIUM SERPL-SCNC: 3.8 MMOL/L (ref 3.5–5.3)
Q ONSET: 215 MS
QRS COUNT: 17 BEATS
QRS DURATION: 82 MS
QT INTERVAL: 308 MS
QTC CALCULATION(BAZETT): 409 MS
QTC FREDERICIA: 372 MS
R AXIS: -35 DEGREES
RH FACTOR (ANTIGEN D): NORMAL
SODIUM SERPL-SCNC: 138 MMOL/L (ref 136–145)
T AXIS: 31 DEGREES
T OFFSET: 369 MS
VENTRICULAR RATE: 106 BPM

## 2025-08-22 PROCEDURE — 36415 COLL VENOUS BLD VENIPUNCTURE: CPT | Performed by: NURSE PRACTITIONER

## 2025-08-22 PROCEDURE — 93656 COMPRE EP EVAL ABLTJ ATR FIB: CPT | Performed by: STUDENT IN AN ORGANIZED HEALTH CARE EDUCATION/TRAINING PROGRAM

## 2025-08-22 PROCEDURE — 7100000009 HC PHASE TWO TIME - INITIAL BASE CHARGE: Performed by: STUDENT IN AN ORGANIZED HEALTH CARE EDUCATION/TRAINING PROGRAM

## 2025-08-22 PROCEDURE — 3700000002 HC GENERAL ANESTHESIA TIME - EACH INCREMENTAL 1 MINUTE: Performed by: STUDENT IN AN ORGANIZED HEALTH CARE EDUCATION/TRAINING PROGRAM

## 2025-08-22 PROCEDURE — C1760 CLOSURE DEV, VASC: HCPCS | Performed by: STUDENT IN AN ORGANIZED HEALTH CARE EDUCATION/TRAINING PROGRAM

## 2025-08-22 PROCEDURE — 2780000003 HC OR 278 NO HCPCS: Performed by: STUDENT IN AN ORGANIZED HEALTH CARE EDUCATION/TRAINING PROGRAM

## 2025-08-22 PROCEDURE — 2500000004 HC RX 250 GENERAL PHARMACY W/ HCPCS (ALT 636 FOR OP/ED)

## 2025-08-22 PROCEDURE — 7100000010 HC PHASE TWO TIME - EACH INCREMENTAL 1 MINUTE: Performed by: STUDENT IN AN ORGANIZED HEALTH CARE EDUCATION/TRAINING PROGRAM

## 2025-08-22 PROCEDURE — 93010 ELECTROCARDIOGRAM REPORT: CPT | Performed by: INTERNAL MEDICINE

## 2025-08-22 PROCEDURE — 76937 US GUIDE VASCULAR ACCESS: CPT | Performed by: STUDENT IN AN ORGANIZED HEALTH CARE EDUCATION/TRAINING PROGRAM

## 2025-08-22 PROCEDURE — C1759 CATH, INTRA ECHOCARDIOGRAPHY: HCPCS | Performed by: STUDENT IN AN ORGANIZED HEALTH CARE EDUCATION/TRAINING PROGRAM

## 2025-08-22 PROCEDURE — 7100000001 HC RECOVERY ROOM TIME - INITIAL BASE CHARGE: Performed by: STUDENT IN AN ORGANIZED HEALTH CARE EDUCATION/TRAINING PROGRAM

## 2025-08-22 PROCEDURE — 93657 TX L/R ATRIAL FIB ADDL: CPT | Performed by: STUDENT IN AN ORGANIZED HEALTH CARE EDUCATION/TRAINING PROGRAM

## 2025-08-22 PROCEDURE — C1730 CATH, EP, 19 OR FEW ELECT: HCPCS | Performed by: STUDENT IN AN ORGANIZED HEALTH CARE EDUCATION/TRAINING PROGRAM

## 2025-08-22 PROCEDURE — 2500000004 HC RX 250 GENERAL PHARMACY W/ HCPCS (ALT 636 FOR OP/ED): Performed by: STUDENT IN AN ORGANIZED HEALTH CARE EDUCATION/TRAINING PROGRAM

## 2025-08-22 PROCEDURE — 85347 COAGULATION TIME ACTIVATED: CPT

## 2025-08-22 PROCEDURE — C1887 CATHETER, GUIDING: HCPCS | Performed by: STUDENT IN AN ORGANIZED HEALTH CARE EDUCATION/TRAINING PROGRAM

## 2025-08-22 PROCEDURE — 7100000002 HC RECOVERY ROOM TIME - EACH INCREMENTAL 1 MINUTE: Performed by: STUDENT IN AN ORGANIZED HEALTH CARE EDUCATION/TRAINING PROGRAM

## 2025-08-22 PROCEDURE — 2720000007 HC OR 272 NO HCPCS: Performed by: STUDENT IN AN ORGANIZED HEALTH CARE EDUCATION/TRAINING PROGRAM

## 2025-08-22 PROCEDURE — 93005 ELECTROCARDIOGRAM TRACING: CPT

## 2025-08-22 PROCEDURE — C1733 CATH, EP, OTHR THAN COOL-TIP: HCPCS | Performed by: STUDENT IN AN ORGANIZED HEALTH CARE EDUCATION/TRAINING PROGRAM

## 2025-08-22 PROCEDURE — 80048 BASIC METABOLIC PNL TOTAL CA: CPT | Performed by: NURSE PRACTITIONER

## 2025-08-22 PROCEDURE — 3700000001 HC GENERAL ANESTHESIA TIME - INITIAL BASE CHARGE: Performed by: STUDENT IN AN ORGANIZED HEALTH CARE EDUCATION/TRAINING PROGRAM

## 2025-08-22 PROCEDURE — C1894 INTRO/SHEATH, NON-LASER: HCPCS | Performed by: STUDENT IN AN ORGANIZED HEALTH CARE EDUCATION/TRAINING PROGRAM

## 2025-08-22 PROCEDURE — C1766 INTRO/SHEATH,STRBLE,NON-PEEL: HCPCS | Performed by: STUDENT IN AN ORGANIZED HEALTH CARE EDUCATION/TRAINING PROGRAM

## 2025-08-22 RX ORDER — HYDRALAZINE HYDROCHLORIDE 20 MG/ML
5 INJECTION INTRAMUSCULAR; INTRAVENOUS EVERY 30 MIN PRN
Status: DISCONTINUED | OUTPATIENT
Start: 2025-08-22 | End: 2025-08-22 | Stop reason: HOSPADM

## 2025-08-22 RX ORDER — ROCURONIUM BROMIDE 10 MG/ML
INJECTION, SOLUTION INTRAVENOUS AS NEEDED
Status: DISCONTINUED | OUTPATIENT
Start: 2025-08-22 | End: 2025-08-22

## 2025-08-22 RX ORDER — ACETAMINOPHEN 325 MG/1
650 TABLET ORAL EVERY 4 HOURS PRN
Status: DISCONTINUED | OUTPATIENT
Start: 2025-08-22 | End: 2025-08-22 | Stop reason: HOSPADM

## 2025-08-22 RX ORDER — ONDANSETRON HYDROCHLORIDE 2 MG/ML
4 INJECTION, SOLUTION INTRAVENOUS ONCE AS NEEDED
Status: DISCONTINUED | OUTPATIENT
Start: 2025-08-22 | End: 2025-08-22 | Stop reason: HOSPADM

## 2025-08-22 RX ORDER — ONDANSETRON 4 MG/1
4 TABLET, FILM COATED ORAL EVERY 8 HOURS PRN
Status: DISCONTINUED | OUTPATIENT
Start: 2025-08-22 | End: 2025-08-22 | Stop reason: HOSPADM

## 2025-08-22 RX ORDER — SODIUM CHLORIDE, SODIUM LACTATE, POTASSIUM CHLORIDE, CALCIUM CHLORIDE 600; 310; 30; 20 MG/100ML; MG/100ML; MG/100ML; MG/100ML
100 INJECTION, SOLUTION INTRAVENOUS CONTINUOUS
Status: DISCONTINUED | OUTPATIENT
Start: 2025-08-22 | End: 2025-08-22 | Stop reason: HOSPADM

## 2025-08-22 RX ORDER — ONDANSETRON HYDROCHLORIDE 2 MG/ML
4 INJECTION, SOLUTION INTRAVENOUS EVERY 8 HOURS PRN
Status: DISCONTINUED | OUTPATIENT
Start: 2025-08-22 | End: 2025-08-22 | Stop reason: HOSPADM

## 2025-08-22 RX ORDER — PHENYLEPHRINE HYDROCHLORIDE 10 MG/ML
INJECTION INTRAVENOUS AS NEEDED
Status: DISCONTINUED | OUTPATIENT
Start: 2025-08-22 | End: 2025-08-22

## 2025-08-22 RX ORDER — HEPARIN SODIUM 1000 [USP'U]/ML
INJECTION, SOLUTION INTRAVENOUS; SUBCUTANEOUS AS NEEDED
Status: DISCONTINUED | OUTPATIENT
Start: 2025-08-22 | End: 2025-08-22

## 2025-08-22 RX ORDER — BUPIVACAINE HYDROCHLORIDE 2.5 MG/ML
INJECTION, SOLUTION EPIDURAL; INFILTRATION; INTRACAUDAL; PERINEURAL AS NEEDED
Status: DISCONTINUED | OUTPATIENT
Start: 2025-08-22 | End: 2025-08-22 | Stop reason: HOSPADM

## 2025-08-22 RX ORDER — FENTANYL CITRATE 50 UG/ML
INJECTION, SOLUTION INTRAMUSCULAR; INTRAVENOUS AS NEEDED
Status: DISCONTINUED | OUTPATIENT
Start: 2025-08-22 | End: 2025-08-22

## 2025-08-22 RX ORDER — LIDOCAINE HYDROCHLORIDE 20 MG/ML
INJECTION, SOLUTION INFILTRATION; PERINEURAL AS NEEDED
Status: DISCONTINUED | OUTPATIENT
Start: 2025-08-22 | End: 2025-08-22

## 2025-08-22 RX ORDER — HEPARIN SODIUM 200 [USP'U]/100ML
INJECTION, SOLUTION INTRAVENOUS CONTINUOUS PRN
Status: DISCONTINUED | OUTPATIENT
Start: 2025-08-22 | End: 2025-08-22

## 2025-08-22 RX ORDER — ONDANSETRON HYDROCHLORIDE 2 MG/ML
INJECTION, SOLUTION INTRAVENOUS AS NEEDED
Status: DISCONTINUED | OUTPATIENT
Start: 2025-08-22 | End: 2025-08-22

## 2025-08-22 RX ORDER — PROPOFOL 10 MG/ML
INJECTION, EMULSION INTRAVENOUS AS NEEDED
Status: DISCONTINUED | OUTPATIENT
Start: 2025-08-22 | End: 2025-08-22

## 2025-08-22 RX ORDER — LIDOCAINE HYDROCHLORIDE 10 MG/ML
0.1 INJECTION, SOLUTION INFILTRATION; PERINEURAL ONCE
Status: DISCONTINUED | OUTPATIENT
Start: 2025-08-22 | End: 2025-08-22 | Stop reason: HOSPADM

## 2025-08-22 RX ORDER — PROTAMINE SULFATE 10 MG/ML
INJECTION, SOLUTION INTRAVENOUS AS NEEDED
Status: DISCONTINUED | OUTPATIENT
Start: 2025-08-22 | End: 2025-08-22

## 2025-08-22 RX ORDER — ALBUTEROL SULFATE 0.83 MG/ML
2.5 SOLUTION RESPIRATORY (INHALATION) ONCE AS NEEDED
Status: DISCONTINUED | OUTPATIENT
Start: 2025-08-22 | End: 2025-08-22 | Stop reason: HOSPADM

## 2025-08-22 RX ADMIN — PROPOFOL 200 MG: 10 INJECTION, EMULSION INTRAVENOUS at 13:06

## 2025-08-22 RX ADMIN — HEPARIN SODIUM 10000 UNITS: 1000 INJECTION INTRAVENOUS; SUBCUTANEOUS at 14:00

## 2025-08-22 RX ADMIN — ROCURONIUM BROMIDE 10 MG: 10 INJECTION, SOLUTION INTRAVENOUS at 14:02

## 2025-08-22 RX ADMIN — PHENYLEPHRINE HYDROCHLORIDE 100 MCG: 10 INJECTION INTRAVENOUS at 13:16

## 2025-08-22 RX ADMIN — PROTAMINE SULFATE 20 MG: 10 INJECTION, SOLUTION INTRAVENOUS at 14:55

## 2025-08-22 RX ADMIN — PHENYLEPHRINE HYDROCHLORIDE 200 MCG: 10 INJECTION INTRAVENOUS at 14:17

## 2025-08-22 RX ADMIN — PHENYLEPHRINE HYDROCHLORIDE 100 MCG: 10 INJECTION INTRAVENOUS at 14:24

## 2025-08-22 RX ADMIN — PHENYLEPHRINE HYDROCHLORIDE 200 MCG: 10 INJECTION INTRAVENOUS at 14:35

## 2025-08-22 RX ADMIN — HEPARIN SODIUM 1000 UNITS/HR: 200 INJECTION, SOLUTION INTRAVENOUS at 14:00

## 2025-08-22 RX ADMIN — ROCURONIUM BROMIDE 80 MG: 10 INJECTION, SOLUTION INTRAVENOUS at 13:06

## 2025-08-22 RX ADMIN — LIDOCAINE HYDROCHLORIDE 50 MG: 20 INJECTION, SOLUTION INFILTRATION; PERINEURAL at 13:06

## 2025-08-22 RX ADMIN — DEXAMETHASONE SODIUM PHOSPHATE 8 MG: 4 INJECTION, SOLUTION INTRAMUSCULAR; INTRAVENOUS at 13:13

## 2025-08-22 RX ADMIN — PHENYLEPHRINE HYDROCHLORIDE 100 MCG: 10 INJECTION INTRAVENOUS at 14:09

## 2025-08-22 RX ADMIN — HEPARIN SODIUM 1000 UNITS: 1000 INJECTION INTRAVENOUS; SUBCUTANEOUS at 14:41

## 2025-08-22 RX ADMIN — SUGAMMADEX 200 MG: 100 INJECTION, SOLUTION INTRAVENOUS at 15:00

## 2025-08-22 RX ADMIN — ONDANSETRON HYDROCHLORIDE 4 MG: 2 INJECTION INTRAMUSCULAR; INTRAVENOUS at 14:44

## 2025-08-22 RX ADMIN — PHENYLEPHRINE HYDROCHLORIDE 200 MCG: 10 INJECTION INTRAVENOUS at 13:25

## 2025-08-22 RX ADMIN — PHENYLEPHRINE HYDROCHLORIDE 250 MCG: 10 INJECTION INTRAVENOUS at 14:29

## 2025-08-22 RX ADMIN — FENTANYL CITRATE 25 MCG: 50 INJECTION, SOLUTION INTRAMUSCULAR; INTRAVENOUS at 13:06

## 2025-08-22 RX ADMIN — SODIUM CHLORIDE, POTASSIUM CHLORIDE, SODIUM LACTATE AND CALCIUM CHLORIDE: 600; 310; 30; 20 INJECTION, SOLUTION INTRAVENOUS at 12:48

## 2025-08-22 SDOH — HEALTH STABILITY: MENTAL HEALTH: CURRENT SMOKER: 0

## 2025-08-22 ASSESSMENT — PAIN SCALES - GENERAL
PAINLEVEL_OUTOF10: 4
PAINLEVEL_OUTOF10: 3
PAINLEVEL_OUTOF10: 3
PAINLEVEL_OUTOF10: 0 - NO PAIN

## 2025-08-22 ASSESSMENT — PAIN - FUNCTIONAL ASSESSMENT
PAIN_FUNCTIONAL_ASSESSMENT: 0-10

## 2025-08-25 LAB
ACT BLD: 262 SEC (ref 89–169)
ACT BLD: 331 SEC (ref 89–169)
ACT BLD: >400 SEC (ref 89–169)

## 2025-08-25 ASSESSMENT — PAIN SCALES - GENERAL: PAINLEVEL_OUTOF10: 0 - NO PAIN

## 2025-08-28 LAB
ATRIAL RATE: 300 BPM
Q ONSET: 215 MS
QRS COUNT: 17 BEATS
QRS DURATION: 82 MS
QT INTERVAL: 308 MS
QTC CALCULATION(BAZETT): 409 MS
QTC FREDERICIA: 372 MS
R AXIS: -35 DEGREES
T AXIS: 31 DEGREES
T OFFSET: 369 MS
VENTRICULAR RATE: 106 BPM

## (undated) DEVICE — Device

## (undated) DEVICE — TROCAR, OPTICAL BLADELESS 5MM X 100 W/ADVANCED FIXATION

## (undated) DEVICE — INTRODUCER SET, ULTIMUM IV, 12FR X 12CM

## (undated) DEVICE — ELECTRODE KIT, ENSITE X EP SYSTEM SURFACE

## (undated) DEVICE — ASSEMBLY, STRYKER FLOW 2, SUCTION IRRIGATOR, WITH TIP

## (undated) DEVICE — CARE KIT, LAPAROSCOPIC, ADVANCED

## (undated) DEVICE — CATHETER, VIEW FLEX XTRA ICE, 9FR

## (undated) DEVICE — CATHETER, ELCTROPHYSIOLOGY, DIAGNOSTIC, SUPREME, 4 ELECTRODE, 2-5-2 MM SPACING, JOSEPHSON CURVE, 6 FR X 120 CM

## (undated) DEVICE — INTRODUCER, SHEATH, FAST-CATH, 6 FR X 23 CM

## (undated) DEVICE — GOWN, ASTOUND, L

## (undated) DEVICE — SOLUTION, IRRIGATION, SODIUM CHLORIDE 0.9%, 1000 ML, POUR BOTTLE

## (undated) DEVICE — ADHESIVE, SKIN, LIQUIBAND EXCEED

## (undated) DEVICE — ACCESS SYS, KII SHIELDED BLADED, Z-THREAD, 5X100CM

## (undated) DEVICE — NEEDLE, SAFETY, 21 G X 1.5 IN

## (undated) DEVICE — DRAPE, INSTRUMENT, W/POUCH, STERI DRAPE, 9 5/8 X 18 LONG

## (undated) DEVICE — SHEATH, STEERABLE, FARADRIVE, CLEAR

## (undated) DEVICE — CLOSURE SYSTEM, VASCADE MVP XL, 10-12FR

## (undated) DEVICE — CLOSURE SYSTEM, VASCULAR, MVP 6-12FR, VENOUS

## (undated) DEVICE — CABLE CONNECTION, CATHETER, PFA RX-HRD

## (undated) DEVICE — TUBE SET, PNEUMOCLEAR, SMOKE EVACU, HIGH-FLOW

## (undated) DEVICE — PACK, ANGIO P2, CUSTOM, LAKE

## (undated) DEVICE — COVER, EQUIPMENT, ZERO GRAVITY

## (undated) DEVICE — SUTURE, VICRYL, 0, 18 IN, TIE, VIOLET

## (undated) DEVICE — CLIP, ENDO, CLINCH II, W/RATCHET, ON/OFF, CLINCH II, 5 MM

## (undated) DEVICE — SOLUTION, INJECTION, USP, SODIUM CHLORIDE 0.9%, .9, NACL, 1000 ML, BAG

## (undated) DEVICE — CATHETER, ABLATION, PULSED FIELD, FARAWAVE 31 MM

## (undated) DEVICE — CAUTERY, PENCIL, PUSH BUTTON, SMOKE EVAC, 70MM

## (undated) DEVICE — HEMOSTAT, ARISTA, ABSORBABLE, 3 GRAMS

## (undated) DEVICE — GUIDEWIRE, AMPLATZ, TFE, EXTRA STIFF, CURVED, .035/180CM/3MMTIP

## (undated) DEVICE — PAD, ELECTRODE DEFIB PADPRO ADULT STRL W/ADAPTER

## (undated) DEVICE — CABLE, ELECTROSURGICAL, MONOPOLAR, LAPAROSCOPIC, 10 FT, LF

## (undated) DEVICE — APPLICATOR, CHLORAPREP, W/ORANGE TINT, 26ML

## (undated) DEVICE — SUTURE, PDS, 0, 18 IN, LIGATING LOOP, VIOLET

## (undated) DEVICE — DRAPE PACK, LAPAROSCOPIC CHOLECYSTECTOMY, CUSTOM, GEAUGA

## (undated) DEVICE — SUTURE, VICRYL, 4-0, 18 IN, PS2, UNDYED

## (undated) DEVICE — APPLICATOR, ARISTA, FLEXITIP, XL, LF

## (undated) DEVICE — NEEDLE, INSUFFLATION, 13GAX120MM, DISP

## (undated) DEVICE — SYRINGE, LUER LOCK, 12ML

## (undated) DEVICE — GUIDEWIRE, INQWIRE, .035 X 180CM, 1.5 J-TIP

## (undated) DEVICE — TROCAR, KII OPTICAL BLADELESS 5MM Z THREAD 100MM LNGTH

## (undated) DEVICE — SCISSOR, MINI ENDO CUT, TIPS, DISP

## (undated) DEVICE — ELECTRODE, ELECTROSURGICAL, BLADE, INSULATED, ENT/IMA, STERILE

## (undated) DEVICE — SHEATH, BRITE TIP 10 X 11CM

## (undated) DEVICE — TROCAR SYSTEM, BALLOON, KII GELPORT, 12 X 100MM